# Patient Record
Sex: FEMALE | Race: WHITE | NOT HISPANIC OR LATINO | Employment: OTHER | URBAN - METROPOLITAN AREA
[De-identification: names, ages, dates, MRNs, and addresses within clinical notes are randomized per-mention and may not be internally consistent; named-entity substitution may affect disease eponyms.]

---

## 2017-02-01 RX ORDER — RAMIPRIL 2.5 MG/1
2.5 CAPSULE ORAL DAILY
Qty: 90 CAPSULE | Refills: 1 | OUTPATIENT
Start: 2017-02-01

## 2017-02-01 NOTE — TELEPHONE ENCOUNTER
----- Message from [facility] sent at 2/1/2017  8:25 AM CST -----  Contact: [facility]  Refill Request    ramipril (ALTACE) 2.5 MG capsule    Thanks!

## 2017-03-29 ENCOUNTER — HOSPITAL ENCOUNTER (OUTPATIENT)
Dept: RADIOLOGY | Facility: HOSPITAL | Age: 77
Discharge: HOME | End: 2017-03-29
Attending: SPECIALIST
Payer: MEDICARE

## 2017-03-29 DIAGNOSIS — M51.87 OTHER INTERVERTEBRAL DISC DISORDERS, LUMBOSACRAL REGION: ICD-10-CM

## 2017-03-29 DIAGNOSIS — M51.87 OTHER INTERVERTEBRAL DISC DISORDERS, LUMBOSACRAL REGION: Primary | ICD-10-CM

## 2017-03-29 PROCEDURE — 72100 X-RAY EXAM L-S SPINE 2/3 VWS: CPT | Mod: 26,,, | Performed by: RADIOLOGY

## 2017-03-29 PROCEDURE — 72100 X-RAY EXAM L-S SPINE 2/3 VWS: CPT | Mod: TC

## 2017-05-09 RX ORDER — CLOPIDOGREL BISULFATE 75 MG/1
75 TABLET ORAL DAILY
Qty: 90 TABLET | Refills: 3 | Status: SHIPPED | OUTPATIENT
Start: 2017-05-09

## 2017-05-09 NOTE — TELEPHONE ENCOUNTER
----- Message from Carl Beard sent at 5/9/2017  9:12 AM CDT -----  Contact: Dorinda/[facility]/[phone]  Refill:  clopidogrel (PLAVIX) 75 mg tablet    Thank you.

## 2018-01-15 ENCOUNTER — HOSPITAL ENCOUNTER (EMERGENCY)
Facility: HOSPITAL | Age: 78
Discharge: HOME | End: 2018-01-15
Attending: EMERGENCY MEDICINE
Payer: MEDICARE

## 2018-01-15 VITALS
OXYGEN SATURATION: 100 % | HEART RATE: 84 BPM | SYSTOLIC BLOOD PRESSURE: 138 MMHG | TEMPERATURE: 98 F | WEIGHT: 126 LBS | HEIGHT: 66 IN | BODY MASS INDEX: 20.25 KG/M2 | DIASTOLIC BLOOD PRESSURE: 65 MMHG | RESPIRATION RATE: 17 BRPM

## 2018-01-15 DIAGNOSIS — B34.9 VIRAL SYNDROME: Primary | ICD-10-CM

## 2018-01-15 DIAGNOSIS — R07.9 CHEST PAIN: ICD-10-CM

## 2018-01-15 LAB
DEPRECATED S PYO AG THROAT QL EIA: NEGATIVE
FLUAV AG SPEC QL IA: NEGATIVE
FLUBV AG SPEC QL IA: NEGATIVE
SPECIMEN SOURCE: NORMAL

## 2018-01-15 PROCEDURE — 87081 CULTURE SCREEN ONLY: CPT

## 2018-01-15 PROCEDURE — 87400 INFLUENZA A/B EACH AG IA: CPT

## 2018-01-15 PROCEDURE — 87880 STREP A ASSAY W/OPTIC: CPT

## 2018-01-15 PROCEDURE — 99284 EMERGENCY DEPT VISIT MOD MDM: CPT

## 2018-01-15 NOTE — ED PROVIDER NOTES
Encounter Date: 1/15/2018       History     Chief Complaint   Patient presents with    Nasal Congestion     and drainage , body aches, shaky and lightheaded, since yesterday morning     HPI  Review of patient's allergies indicates:   Allergen Reactions    Sulfa (sulfonamide antibiotics) Hives    Percocet [oxycodone-acetaminophen] Nausea And Vomiting     Past Medical History:   Diagnosis Date    Arthritis     Rheumatoid  arthritis    Cardiac arrhythmia     Coronary artery disease     Diabetes mellitus type II     Pneumonia     as a kid, with empyema    Renal insufficiency     Rheumatoid arthritis(714.0)      Past Surgical History:   Procedure Laterality Date    CARDIAC DEFIBRILLATOR PLACEMENT      CORONARY ARTERY BYPASS GRAFT       4 para 3       HYSTERECTOMY      osteomyelitis      TONSILLECTOMY      TOTAL KNEE ARTHROPLASTY      TOTAL SHOULDER ARTHROPLASTY       Family History   Problem Relation Age of Onset    Heart disease Mother     Diabetes Mother     Cancer Maternal Uncle      Social History   Substance Use Topics    Smoking status: Never Smoker    Smokeless tobacco: Never Used      Comment: Lives with son, extended family.  Three children.    Alcohol use Yes      Comment: only during holidays     Review of Systems    Physical Exam     Initial Vitals [01/15/18 0920]   BP Pulse Resp Temp SpO2   (!) 94/55 88 16 96.6 °F (35.9 °C) 98 %      MAP       68         Physical Exam    ED Course   Procedures  Labs Reviewed   THROAT SCREEN, RAPID   CULTURE, STREP A,  THROAT   INFLUENZA A AND B ANTIGEN                               ED Course      Clinical Impression:   {Add your Clinical Impression here. If you haven't documented one yet, please pend the note, finalize a Clinical Impression, and refresh your note before signing.:[id number]}

## 2018-01-15 NOTE — ED TRIAGE NOTES
Pt arrived to ED via personal transportation for c/o Nasal Congestion (and drainage , body aches, shaky, since this morning). Denies chest pain and SOB. Denies N/V/D/F. REU. AAO x 4. Will continue to monitor.

## 2018-01-15 NOTE — ED PROVIDER NOTES
Encounter Date: 1/15/2018    SCRIBE #1 NOTE: I, Lenny Lemus II, am scribing for, and in the presence of,  Ralph Hoang MD. I have scribed the following portions of the note - Other sections scribed: HPI, ROS, PE.       History     Chief Complaint   Patient presents with    Nasal Congestion     and drainage , body aches, shaky and lightheaded, since yesterday morning     CC: Dry Lips     HPI: This 77 y.o. female with NIDDM, CAD, cardiac arrhythmia, arthritis, pneumonia, rheumatoid arthritis, renal insufficiency,  presents to the ED c/o acute onset, dry lips with sore throat, myalgia, and mouth pain. Pt reports she woke up this morning with dry lips. She reports she called her PCP and was told she would be unable to schedule an appointment until . She reports she was concerned so she visited the ED. Pt reports applying Vaseline to her lips with minimal alleviation. Pt denies headache, rhinorrhea, and diarrhea.         The history is provided by the patient. No  was used.     Review of patient's allergies indicates:   Allergen Reactions    Sulfa (sulfonamide antibiotics) Hives    Percocet [oxycodone-acetaminophen] Nausea And Vomiting     Past Medical History:   Diagnosis Date    Arthritis     Rheumatoid  arthritis    Cardiac arrhythmia     Coronary artery disease     Diabetes mellitus type II     Pneumonia     as a kid, with empyema    Renal insufficiency     Rheumatoid arthritis(714.0)      Past Surgical History:   Procedure Laterality Date    CARDIAC DEFIBRILLATOR PLACEMENT      CORONARY ARTERY BYPASS GRAFT       4 para 3       HYSTERECTOMY      osteomyelitis      TONSILLECTOMY      TOTAL KNEE ARTHROPLASTY      TOTAL SHOULDER ARTHROPLASTY       Family History   Problem Relation Age of Onset    Heart disease Mother     Diabetes Mother     Cancer Maternal Uncle      Social History   Substance Use Topics    Smoking status: Never Smoker    Smokeless tobacco:  Never Used      Comment: Lives with son, extended family.  Three children.    Alcohol use Yes      Comment: only during holidays     Review of Systems   Constitutional: Negative for fever.   HENT: Positive for sore throat.         (+) dry lips   Respiratory: Negative for shortness of breath.    Cardiovascular: Negative for chest pain.   Gastrointestinal: Negative for nausea.   Genitourinary: Negative for dysuria.   Musculoskeletal: Positive for myalgias. Negative for back pain.   Skin: Negative for rash.   Neurological: Negative for weakness.   Hematological: Does not bruise/bleed easily.       Physical Exam     Initial Vitals [01/15/18 0920]   BP Pulse Resp Temp SpO2   (!) 94/55 88 16 96.6 °F (35.9 °C) 98 %      MAP       68         Physical Exam    Nursing note and vitals reviewed.  Constitutional: She appears well-developed and well-nourished. She is cooperative.  Non-toxic appearance. No distress.   HENT:   Head: Normocephalic and atraumatic.   Mouth/Throat: Oropharynx is clear and moist.   Dry Lips   Eyes: Conjunctivae and EOM are normal. Pupils are equal, round, and reactive to light.   Neck: Normal range of motion and full passive range of motion without pain. Neck supple. No thyromegaly present. No JVD present.   Cardiovascular: Normal rate, regular rhythm, normal heart sounds and normal pulses.   Pulmonary/Chest: Effort normal and breath sounds normal. No respiratory distress.   Abdominal: Soft. Normal appearance and bowel sounds are normal. She exhibits no distension and no mass. There is no tenderness.   Musculoskeletal: Normal range of motion.   Neurological: She is alert and oriented to person, place, and time. She has normal strength. No cranial nerve deficit or sensory deficit.   Skin: Skin is warm, dry and intact. No rash noted.   Psychiatric: She has a normal mood and affect. Her speech is normal and behavior is normal. Judgment and thought content normal.         ED Course   Procedures  Labs  Reviewed   THROAT SCREEN, RAPID   INFLUENZA A AND B ANTIGEN        Imaging Results    None                       Scribe Attestation:   Scribe #1: I performed the above scribed service and the documentation accurately describes the services I performed. I attest to the accuracy of the note.    Attending Attestation:           Physician Attestation for Scribe:  Physician Attestation Statement for Scribe #1: I, Ralph Hoang MD, reviewed documentation, as scribed by Lenny Lemus II in my presence, and it is both accurate and complete.                 ED Course      Clinical Impression:   Diagnoses of Chest pain and Chest pain were pertinent to this visit.    Disposition:   Disposition: Discharged  77-year-old white female with a very strange affect presents to the ER complaining that her lips feel sore today and dry she has not put anything on them she complained to the nurses that she had aches and pains all over her body including her chest although she does not mention this to me at all her physical exam is completely benign her lips do look a little bit dry but there is no lesions on them.  Her flu screen is negative and her strep screen is negative her vital signs are normal no send her home after follow-up with her primary care doctor I also asked her to pass by the pharmacy and buy something to more stress her lips                        Ralph Hoang MD  01/15/18 0546

## 2018-01-17 LAB — BACTERIA THROAT CULT: NORMAL

## 2018-01-20 ENCOUNTER — HOSPITAL ENCOUNTER (INPATIENT)
Facility: HOSPITAL | Age: 78
LOS: 9 days | Discharge: HOME HEALTH | DRG: 637 | End: 2018-01-29
Attending: EMERGENCY MEDICINE | Admitting: INTERNAL MEDICINE
Payer: MEDICARE

## 2018-01-20 DIAGNOSIS — B37.31 VAGINAL YEAST INFECTION: ICD-10-CM

## 2018-01-20 DIAGNOSIS — D64.9 ANEMIA, UNSPECIFIED TYPE: ICD-10-CM

## 2018-01-20 DIAGNOSIS — R07.9 CHEST PAIN: ICD-10-CM

## 2018-01-20 DIAGNOSIS — E11.10 DIABETIC KETOACIDOSIS WITHOUT COMA ASSOCIATED WITH TYPE 2 DIABETES MELLITUS: ICD-10-CM

## 2018-01-20 DIAGNOSIS — D61.818 PANCYTOPENIA: Primary | ICD-10-CM

## 2018-01-20 DIAGNOSIS — E87.1 HYPONATREMIA: ICD-10-CM

## 2018-01-20 DIAGNOSIS — D70.9 NEUTROPENIA, UNSPECIFIED TYPE: ICD-10-CM

## 2018-01-20 DIAGNOSIS — B37.0 THRUSH: ICD-10-CM

## 2018-01-20 LAB
ALBUMIN SERPL BCP-MCNC: 2.9 G/DL
ALLENS TEST: ABNORMAL
ALP SERPL-CCNC: 55 U/L
ALT SERPL W/O P-5'-P-CCNC: 23 U/L
ANION GAP SERPL CALC-SCNC: 23 MMOL/L
AST SERPL-CCNC: 13 U/L
B-OH-BUTYR BLD STRIP-SCNC: 5.5 MMOL/L
BACTERIA #/AREA URNS HPF: ABNORMAL /HPF
BACTERIA GENITAL QL WET PREP: ABNORMAL
BASOPHILS # BLD AUTO: ABNORMAL K/UL
BASOPHILS NFR BLD: 0 %
BILIRUB SERPL-MCNC: 0.7 MG/DL
BILIRUB UR QL STRIP: NEGATIVE
BUN SERPL-MCNC: 28 MG/DL
CALCIUM SERPL-MCNC: 9.3 MG/DL
CHLORIDE SERPL-SCNC: 96 MMOL/L
CLARITY UR: ABNORMAL
CLUE CELLS VAG QL WET PREP: ABNORMAL
CO2 SERPL-SCNC: 8 MMOL/L
COLOR UR: YELLOW
CREAT SERPL-MCNC: 1.4 MG/DL
DELSYS: ABNORMAL
DIFFERENTIAL METHOD: ABNORMAL
EOSINOPHIL # BLD AUTO: ABNORMAL K/UL
EOSINOPHIL NFR BLD: 4 %
ERYTHROCYTE [DISTWIDTH] IN BLOOD BY AUTOMATED COUNT: 14.3 %
EST. GFR  (AFRICAN AMERICAN): 42 ML/MIN/1.73 M^2
EST. GFR  (NON AFRICAN AMERICAN): 37 ML/MIN/1.73 M^2
FILAMENT FUNGI VAG WET PREP-#/AREA: ABNORMAL
GLUCOSE SERPL-MCNC: 451 MG/DL
GLUCOSE UR QL STRIP: ABNORMAL
HCO3 UR-SCNC: 10.3 MMOL/L (ref 24–28)
HCT VFR BLD AUTO: 30.9 %
HGB BLD-MCNC: 10.7 G/DL
HGB UR QL STRIP: ABNORMAL
HYALINE CASTS #/AREA URNS LPF: 0 /LPF
KETONES UR QL STRIP: ABNORMAL
LEUKOCYTE ESTERASE UR QL STRIP: ABNORMAL
LYMPHOCYTES # BLD AUTO: ABNORMAL K/UL
LYMPHOCYTES NFR BLD: 58 %
MCH RBC QN AUTO: 31.2 PG
MCHC RBC AUTO-ENTMCNC: 34.6 G/DL
MCV RBC AUTO: 90 FL
MICROSCOPIC COMMENT: ABNORMAL
MONOCYTES # BLD AUTO: ABNORMAL K/UL
MONOCYTES NFR BLD: 10 %
NEUTROPHILS NFR BLD: 8 %
NEUTS BAND NFR BLD MANUAL: 20 %
NITRITE UR QL STRIP: NEGATIVE
OSMOLALITY SERPL: 296 MOSM/KG
PCO2 BLDA: 21 MMHG (ref 35–45)
PH SMN: 7.3 [PH] (ref 7.35–7.45)
PH UR STRIP: 5 [PH] (ref 5–8)
PLATELET # BLD AUTO: 41 K/UL
PMV BLD AUTO: 12.4 FL
PO2 BLDA: 21 MMHG (ref 40–60)
POC BE: -14 MMOL/L
POC SATURATED O2: 30 % (ref 95–100)
POC TCO2: 11 MMOL/L (ref 24–29)
POCT GLUCOSE: 349 MG/DL (ref 70–110)
POCT GLUCOSE: 375 MG/DL (ref 70–110)
POCT GLUCOSE: 420 MG/DL (ref 70–110)
POTASSIUM SERPL-SCNC: 4.7 MMOL/L
PROT SERPL-MCNC: 6.4 G/DL
PROT UR QL STRIP: ABNORMAL
RBC # BLD AUTO: 3.43 M/UL
RBC #/AREA URNS HPF: >100 /HPF (ref 0–4)
SAMPLE: ABNORMAL
SITE: ABNORMAL
SODIUM SERPL-SCNC: 127 MMOL/L
SP GR UR STRIP: 1.02 (ref 1–1.03)
SPECIMEN SOURCE: ABNORMAL
SQUAMOUS #/AREA URNS HPF: 5 /HPF
T VAGINALIS GENITAL QL WET PREP: ABNORMAL
URN SPEC COLLECT METH UR: ABNORMAL
UROBILINOGEN UR STRIP-ACNC: NEGATIVE EU/DL
WBC # BLD AUTO: 0.42 K/UL
WBC #/AREA URNS HPF: 2 /HPF (ref 0–5)
WBC #/AREA VAG WET PREP: ABNORMAL
YEAST GENITAL QL WET PREP: ABNORMAL
YEAST URNS QL MICRO: ABNORMAL

## 2018-01-20 PROCEDURE — 99285 EMERGENCY DEPT VISIT HI MDM: CPT | Mod: 25

## 2018-01-20 PROCEDURE — 87210 SMEAR WET MOUNT SALINE/INK: CPT

## 2018-01-20 PROCEDURE — 83930 ASSAY OF BLOOD OSMOLALITY: CPT

## 2018-01-20 PROCEDURE — 99291 CRITICAL CARE FIRST HOUR: CPT | Mod: 25

## 2018-01-20 PROCEDURE — 82803 BLOOD GASES ANY COMBINATION: CPT

## 2018-01-20 PROCEDURE — 93010 ELECTROCARDIOGRAM REPORT: CPT | Mod: ,,, | Performed by: INTERNAL MEDICINE

## 2018-01-20 PROCEDURE — 96361 HYDRATE IV INFUSION ADD-ON: CPT | Mod: 59

## 2018-01-20 PROCEDURE — 87088 URINE BACTERIA CULTURE: CPT

## 2018-01-20 PROCEDURE — 85027 COMPLETE CBC AUTOMATED: CPT

## 2018-01-20 PROCEDURE — 20000000 HC ICU ROOM

## 2018-01-20 PROCEDURE — 12000002 HC ACUTE/MED SURGE SEMI-PRIVATE ROOM

## 2018-01-20 PROCEDURE — 80053 COMPREHEN METABOLIC PANEL: CPT

## 2018-01-20 PROCEDURE — 63600175 PHARM REV CODE 636 W HCPCS: Performed by: NURSE PRACTITIONER

## 2018-01-20 PROCEDURE — 87077 CULTURE AEROBIC IDENTIFY: CPT

## 2018-01-20 PROCEDURE — 36410 VNPNXR 3YR/> PHY/QHP DX/THER: CPT

## 2018-01-20 PROCEDURE — 25000003 PHARM REV CODE 250: Performed by: NURSE PRACTITIONER

## 2018-01-20 PROCEDURE — 81000 URINALYSIS NONAUTO W/SCOPE: CPT

## 2018-01-20 PROCEDURE — 82962 GLUCOSE BLOOD TEST: CPT

## 2018-01-20 PROCEDURE — 82010 KETONE BODYS QUAN: CPT

## 2018-01-20 PROCEDURE — 87186 SC STD MICRODIL/AGAR DIL: CPT

## 2018-01-20 PROCEDURE — 85007 BL SMEAR W/DIFF WBC COUNT: CPT

## 2018-01-20 PROCEDURE — 87086 URINE CULTURE/COLONY COUNT: CPT

## 2018-01-20 PROCEDURE — 96374 THER/PROPH/DIAG INJ IV PUSH: CPT | Mod: 59

## 2018-01-20 RX ORDER — FAMOTIDINE 10 MG/ML
20 INJECTION INTRAVENOUS EVERY 12 HOURS
Status: DISCONTINUED | OUTPATIENT
Start: 2018-01-21 | End: 2018-01-23 | Stop reason: ALTCHOICE

## 2018-01-20 RX ORDER — FLUCONAZOLE 150 MG/1
600 TABLET ORAL
Status: COMPLETED | OUTPATIENT
Start: 2018-01-20 | End: 2018-01-20

## 2018-01-20 RX ORDER — RAMIPRIL 2.5 MG/1
2.5 CAPSULE ORAL DAILY
Status: DISCONTINUED | OUTPATIENT
Start: 2018-01-21 | End: 2018-01-21

## 2018-01-20 RX ORDER — SODIUM CHLORIDE 0.9 % (FLUSH) 0.9 %
5 SYRINGE (ML) INJECTION
Status: DISCONTINUED | OUTPATIENT
Start: 2018-01-20 | End: 2018-01-29 | Stop reason: HOSPADM

## 2018-01-20 RX ORDER — DEXTROSE MONOHYDRATE 100 MG/ML
1000 INJECTION, SOLUTION INTRAVENOUS
Status: DISCONTINUED | OUTPATIENT
Start: 2018-01-20 | End: 2018-01-21

## 2018-01-20 RX ORDER — LIDOCAINE HYDROCHLORIDE 20 MG/ML
SOLUTION OROPHARYNGEAL
Qty: 100 ML | Refills: 0 | Status: ON HOLD | OUTPATIENT
Start: 2018-01-20 | End: 2020-01-20 | Stop reason: HOSPADM

## 2018-01-20 RX ORDER — METOPROLOL SUCCINATE 25 MG/1
25 TABLET, EXTENDED RELEASE ORAL 2 TIMES DAILY
Status: DISCONTINUED | OUTPATIENT
Start: 2018-01-21 | End: 2018-01-21

## 2018-01-20 RX ORDER — NYSTATIN 100000 [USP'U]/ML
500000 SUSPENSION ORAL 4 TIMES DAILY
Qty: 200 ML | Refills: 0 | Status: SHIPPED | OUTPATIENT
Start: 2018-01-20 | End: 2018-01-30

## 2018-01-20 RX ORDER — LIDOCAINE HYDROCHLORIDE 20 MG/ML
15 SOLUTION OROPHARYNGEAL
Status: COMPLETED | OUTPATIENT
Start: 2018-01-20 | End: 2018-01-20

## 2018-01-20 RX ORDER — SODIUM CHLORIDE 9 MG/ML
INJECTION, SOLUTION INTRAVENOUS CONTINUOUS
Status: DISCONTINUED | OUTPATIENT
Start: 2018-01-21 | End: 2018-01-21

## 2018-01-20 RX ORDER — PRAVASTATIN SODIUM 40 MG/1
40 TABLET ORAL DAILY
Status: DISCONTINUED | OUTPATIENT
Start: 2018-01-21 | End: 2018-01-29 | Stop reason: HOSPADM

## 2018-01-20 RX ORDER — FLUCONAZOLE 150 MG/1
150 TABLET ORAL
Qty: 3 TABLET | Refills: 0 | Status: SHIPPED | OUTPATIENT
Start: 2018-01-20 | End: 2018-01-27

## 2018-01-20 RX ORDER — FLUCONAZOLE 100 MG/1
400 TABLET ORAL DAILY
Status: DISCONTINUED | OUTPATIENT
Start: 2018-01-21 | End: 2018-01-21

## 2018-01-20 RX ORDER — ASPIRIN 325 MG
325 TABLET, DELAYED RELEASE (ENTERIC COATED) ORAL DAILY
Status: DISCONTINUED | OUTPATIENT
Start: 2018-01-21 | End: 2018-01-29 | Stop reason: HOSPADM

## 2018-01-20 RX ORDER — ONDANSETRON 2 MG/ML
4 INJECTION INTRAMUSCULAR; INTRAVENOUS EVERY 8 HOURS PRN
Status: DISCONTINUED | OUTPATIENT
Start: 2018-01-20 | End: 2018-01-21

## 2018-01-20 RX ORDER — CLOPIDOGREL BISULFATE 75 MG/1
75 TABLET ORAL DAILY
Status: DISCONTINUED | OUTPATIENT
Start: 2018-01-21 | End: 2018-01-29 | Stop reason: HOSPADM

## 2018-01-20 RX ORDER — DEXTROSE MONOHYDRATE 100 MG/ML
1000 INJECTION, SOLUTION INTRAVENOUS
Status: DISCONTINUED | OUTPATIENT
Start: 2018-01-20 | End: 2018-01-29 | Stop reason: HOSPADM

## 2018-01-20 RX ADMIN — LIDOCAINE HYDROCHLORIDE 15 ML: 20 SOLUTION ORAL; TOPICAL at 05:01

## 2018-01-20 RX ADMIN — SODIUM CHLORIDE 1000 ML: 0.9 INJECTION, SOLUTION INTRAVENOUS at 09:01

## 2018-01-20 RX ADMIN — SODIUM CHLORIDE 1000 ML: 0.9 INJECTION, SOLUTION INTRAVENOUS at 11:01

## 2018-01-20 RX ADMIN — FLUCONAZOLE 600 MG: 150 TABLET ORAL at 11:01

## 2018-01-20 RX ADMIN — INSULIN HUMAN 3 UNITS: 100 INJECTION, SOLUTION PARENTERAL at 09:01

## 2018-01-20 NOTE — ED TRIAGE NOTES
Here with family friend. Unclear as to prescribed medications.  C/o lesions and pain to mouth. C/o back pain and painful urination. Reports burning sensation to vagina.

## 2018-01-21 PROBLEM — D84.9 ACQUIRED IMMUNOCOMPROMISED STATE: Status: ACTIVE | Noted: 2018-01-21

## 2018-01-21 PROBLEM — Z91.148 NONCOMPLIANCE WITH MEDICATION REGIMEN: Status: ACTIVE | Noted: 2018-01-21

## 2018-01-21 PROBLEM — B37.0 ORAL THRUSH: Status: ACTIVE | Noted: 2018-01-21

## 2018-01-21 PROBLEM — B37.31 VAGINAL CANDIDIASIS: Status: ACTIVE | Noted: 2018-01-21

## 2018-01-21 PROBLEM — D61.818 PANCYTOPENIA: Status: ACTIVE | Noted: 2018-01-21

## 2018-01-21 PROBLEM — D70.9 NEUTROPENIA: Status: ACTIVE | Noted: 2018-01-21

## 2018-01-21 LAB
ALBUMIN SERPL BCP-MCNC: 2.5 G/DL
ALLENS TEST: ABNORMAL
ALP SERPL-CCNC: 50 U/L
ALT SERPL W/O P-5'-P-CCNC: 22 U/L
ANION GAP SERPL CALC-SCNC: 10 MMOL/L
ANION GAP SERPL CALC-SCNC: 13 MMOL/L
ANION GAP SERPL CALC-SCNC: 21 MMOL/L
ANION GAP SERPL CALC-SCNC: 8 MMOL/L
ANION GAP SERPL CALC-SCNC: 9 MMOL/L
ANISOCYTOSIS BLD QL SMEAR: SLIGHT
AST SERPL-CCNC: 19 U/L
BACTERIA #/AREA URNS HPF: NORMAL /HPF
BASOPHILS # BLD AUTO: 0 K/UL
BASOPHILS NFR BLD: 0 %
BILIRUB SERPL-MCNC: 0.6 MG/DL
BILIRUB UR QL STRIP: NEGATIVE
BUN SERPL-MCNC: 17 MG/DL
BUN SERPL-MCNC: 19 MG/DL
BUN SERPL-MCNC: 20 MG/DL
BUN SERPL-MCNC: 24 MG/DL
BUN SERPL-MCNC: 25 MG/DL
BURR CELLS BLD QL SMEAR: ABNORMAL
CALCIUM SERPL-MCNC: 8.1 MG/DL
CALCIUM SERPL-MCNC: 8.3 MG/DL
CALCIUM SERPL-MCNC: 8.4 MG/DL
CALCIUM SERPL-MCNC: 8.5 MG/DL
CALCIUM SERPL-MCNC: 8.6 MG/DL
CHLORIDE SERPL-SCNC: 102 MMOL/L
CHLORIDE SERPL-SCNC: 103 MMOL/L
CHLORIDE SERPL-SCNC: 104 MMOL/L
CHLORIDE SERPL-SCNC: 106 MMOL/L
CHLORIDE SERPL-SCNC: 106 MMOL/L
CLARITY UR: CLEAR
CO2 SERPL-SCNC: 14 MMOL/L
CO2 SERPL-SCNC: 14 MMOL/L
CO2 SERPL-SCNC: 15 MMOL/L
CO2 SERPL-SCNC: 15 MMOL/L
CO2 SERPL-SCNC: 5 MMOL/L
COLOR UR: YELLOW
CREAT SERPL-MCNC: 0.8 MG/DL
CREAT SERPL-MCNC: 0.8 MG/DL
CREAT SERPL-MCNC: 0.9 MG/DL
CREAT SERPL-MCNC: 1 MG/DL
CREAT SERPL-MCNC: 1.2 MG/DL
DELSYS: ABNORMAL
DIFFERENTIAL METHOD: ABNORMAL
EOSINOPHIL # BLD AUTO: 0.1 K/UL
EOSINOPHIL NFR BLD: 12.7 %
ERYTHROCYTE [DISTWIDTH] IN BLOOD BY AUTOMATED COUNT: 14.1 %
EST. GFR  (AFRICAN AMERICAN): 51 ML/MIN/1.73 M^2
EST. GFR  (AFRICAN AMERICAN): >60 ML/MIN/1.73 M^2
EST. GFR  (NON AFRICAN AMERICAN): 44 ML/MIN/1.73 M^2
EST. GFR  (NON AFRICAN AMERICAN): 55 ML/MIN/1.73 M^2
EST. GFR  (NON AFRICAN AMERICAN): >60 ML/MIN/1.73 M^2
ESTIMATED AVG GLUCOSE: 246 MG/DL
GLUCOSE SERPL-MCNC: 124 MG/DL
GLUCOSE SERPL-MCNC: 128 MG/DL
GLUCOSE SERPL-MCNC: 165 MG/DL
GLUCOSE SERPL-MCNC: 265 MG/DL
GLUCOSE SERPL-MCNC: 297 MG/DL
GLUCOSE UR QL STRIP: ABNORMAL
HBA1C MFR BLD HPLC: 10.2 %
HCO3 UR-SCNC: 12.3 MMOL/L (ref 24–28)
HCT VFR BLD AUTO: 28.7 %
HGB BLD-MCNC: 10.2 G/DL
HGB UR QL STRIP: ABNORMAL
HIV1+2 IGG SERPL QL IA.RAPID: NEGATIVE
HYALINE CASTS #/AREA URNS LPF: 0 /LPF
HYPOCHROMIA BLD QL SMEAR: ABNORMAL
KETONES UR QL STRIP: ABNORMAL
LDH SERPL L TO P-CCNC: 160 U/L
LEUKOCYTE ESTERASE UR QL STRIP: NEGATIVE
LYMPHOCYTES # BLD AUTO: 0.3 K/UL
LYMPHOCYTES NFR BLD: 60 %
MCH RBC QN AUTO: 31.6 PG
MCHC RBC AUTO-ENTMCNC: 35.5 G/DL
MCV RBC AUTO: 89 FL
MICROSCOPIC COMMENT: NORMAL
MONOCYTES # BLD AUTO: 0 K/UL
MONOCYTES NFR BLD: 5.5 %
NEUTROPHILS # BLD AUTO: 0.1 K/UL
NEUTROPHILS NFR BLD: 21.8 %
NITRITE UR QL STRIP: NEGATIVE
PATH REV BLD -IMP: NORMAL
PCO2 BLDA: 21.5 MMHG (ref 35–45)
PH SMN: 7.36 [PH] (ref 7.35–7.45)
PH UR STRIP: 5 [PH] (ref 5–8)
PLATELET # BLD AUTO: 32 K/UL
PLATELET BLD QL SMEAR: ABNORMAL
PMV BLD AUTO: 11.7 FL
PO2 BLDA: 111 MMHG (ref 80–100)
POC BE: -12 MMOL/L
POC SATURATED O2: 98 % (ref 95–100)
POC TCO2: 13 MMOL/L (ref 23–27)
POCT GLUCOSE: 132 MG/DL (ref 70–110)
POCT GLUCOSE: 135 MG/DL (ref 70–110)
POCT GLUCOSE: 136 MG/DL (ref 70–110)
POCT GLUCOSE: 140 MG/DL (ref 70–110)
POCT GLUCOSE: 143 MG/DL (ref 70–110)
POCT GLUCOSE: 152 MG/DL (ref 70–110)
POCT GLUCOSE: 154 MG/DL (ref 70–110)
POCT GLUCOSE: 175 MG/DL (ref 70–110)
POCT GLUCOSE: 176 MG/DL (ref 70–110)
POCT GLUCOSE: 178 MG/DL (ref 70–110)
POCT GLUCOSE: 202 MG/DL (ref 70–110)
POCT GLUCOSE: 281 MG/DL (ref 70–110)
POCT GLUCOSE: 343 MG/DL (ref 70–110)
POCT GLUCOSE: 375 MG/DL (ref 70–110)
POIKILOCYTOSIS BLD QL SMEAR: SLIGHT
POTASSIUM SERPL-SCNC: 3 MMOL/L
POTASSIUM SERPL-SCNC: 3.2 MMOL/L
POTASSIUM SERPL-SCNC: 3.4 MMOL/L
POTASSIUM SERPL-SCNC: 3.8 MMOL/L
POTASSIUM SERPL-SCNC: 4.3 MMOL/L
PROT SERPL-MCNC: 5.4 G/DL
PROT UR QL STRIP: ABNORMAL
RBC # BLD AUTO: 3.23 M/UL
RBC #/AREA URNS HPF: 1 /HPF (ref 0–4)
SAMPLE: ABNORMAL
SITE: ABNORMAL
SODIUM SERPL-SCNC: 127 MMOL/L
SODIUM SERPL-SCNC: 128 MMOL/L
SODIUM SERPL-SCNC: 129 MMOL/L
SODIUM SERPL-SCNC: 130 MMOL/L
SODIUM SERPL-SCNC: 131 MMOL/L
SP GR UR STRIP: 1.02 (ref 1–1.03)
SQUAMOUS #/AREA URNS HPF: 1 /HPF
URN SPEC COLLECT METH UR: ABNORMAL
UROBILINOGEN UR STRIP-ACNC: NEGATIVE EU/DL
WBC # BLD AUTO: 0.55 K/UL
WBC #/AREA URNS HPF: 1 /HPF (ref 0–5)
YEAST URNS QL MICRO: NORMAL

## 2018-01-21 PROCEDURE — 25000003 PHARM REV CODE 250: Performed by: HOSPITALIST

## 2018-01-21 PROCEDURE — 87086 URINE CULTURE/COLONY COUNT: CPT

## 2018-01-21 PROCEDURE — 80053 COMPREHEN METABOLIC PANEL: CPT

## 2018-01-21 PROCEDURE — 36415 COLL VENOUS BLD VENIPUNCTURE: CPT

## 2018-01-21 PROCEDURE — 80048 BASIC METABOLIC PNL TOTAL CA: CPT | Mod: 91

## 2018-01-21 PROCEDURE — 86703 HIV-1/HIV-2 1 RESULT ANTBDY: CPT

## 2018-01-21 PROCEDURE — 63600175 PHARM REV CODE 636 W HCPCS: Performed by: INTERNAL MEDICINE

## 2018-01-21 PROCEDURE — 87040 BLOOD CULTURE FOR BACTERIA: CPT

## 2018-01-21 PROCEDURE — 25000003 PHARM REV CODE 250: Performed by: NURSE PRACTITIONER

## 2018-01-21 PROCEDURE — 85060 BLOOD SMEAR INTERPRETATION: CPT | Mod: ,,, | Performed by: PATHOLOGY

## 2018-01-21 PROCEDURE — 81000 URINALYSIS NONAUTO W/SCOPE: CPT

## 2018-01-21 PROCEDURE — 99223 1ST HOSP IP/OBS HIGH 75: CPT | Mod: ,,, | Performed by: INTERNAL MEDICINE

## 2018-01-21 PROCEDURE — 82803 BLOOD GASES ANY COMBINATION: CPT

## 2018-01-21 PROCEDURE — 25000003 PHARM REV CODE 250: Performed by: INTERNAL MEDICINE

## 2018-01-21 PROCEDURE — 85025 COMPLETE CBC W/AUTO DIFF WBC: CPT

## 2018-01-21 PROCEDURE — 80048 BASIC METABOLIC PNL TOTAL CA: CPT

## 2018-01-21 PROCEDURE — S0028 INJECTION, FAMOTIDINE, 20 MG: HCPCS | Performed by: NURSE PRACTITIONER

## 2018-01-21 PROCEDURE — 83036 HEMOGLOBIN GLYCOSYLATED A1C: CPT

## 2018-01-21 PROCEDURE — 20000000 HC ICU ROOM

## 2018-01-21 PROCEDURE — S5010 5% DEXTROSE AND 0.45% SALINE: HCPCS | Performed by: HOSPITALIST

## 2018-01-21 PROCEDURE — 36600 WITHDRAWAL OF ARTERIAL BLOOD: CPT

## 2018-01-21 PROCEDURE — 83615 LACTATE (LD) (LDH) ENZYME: CPT

## 2018-01-21 RX ORDER — CEFTRIAXONE 1 G/1
1 INJECTION, POWDER, FOR SOLUTION INTRAMUSCULAR; INTRAVENOUS
Status: DISCONTINUED | OUTPATIENT
Start: 2018-01-21 | End: 2018-01-22

## 2018-01-21 RX ORDER — POTASSIUM CHLORIDE 7.45 MG/ML
10 INJECTION INTRAVENOUS
Status: COMPLETED | OUTPATIENT
Start: 2018-01-21 | End: 2018-01-21

## 2018-01-21 RX ORDER — METOPROLOL SUCCINATE 25 MG/1
25 TABLET, EXTENDED RELEASE ORAL 2 TIMES DAILY
Status: DISCONTINUED | OUTPATIENT
Start: 2018-01-21 | End: 2018-01-29 | Stop reason: HOSPADM

## 2018-01-21 RX ORDER — IBUPROFEN 200 MG
16 TABLET ORAL
Status: DISCONTINUED | OUTPATIENT
Start: 2018-01-21 | End: 2018-01-29 | Stop reason: HOSPADM

## 2018-01-21 RX ORDER — NYSTATIN 100000 [USP'U]/ML
500000 SUSPENSION ORAL
Status: DISCONTINUED | OUTPATIENT
Start: 2018-01-21 | End: 2018-01-26

## 2018-01-21 RX ORDER — ACETAMINOPHEN 325 MG/1
650 TABLET ORAL EVERY 4 HOURS PRN
Status: DISCONTINUED | OUTPATIENT
Start: 2018-01-21 | End: 2018-01-29 | Stop reason: HOSPADM

## 2018-01-21 RX ORDER — IBUPROFEN 200 MG
24 TABLET ORAL
Status: DISCONTINUED | OUTPATIENT
Start: 2018-01-21 | End: 2018-01-29 | Stop reason: HOSPADM

## 2018-01-21 RX ORDER — GLUCAGON 1 MG
1 KIT INJECTION
Status: DISCONTINUED | OUTPATIENT
Start: 2018-01-21 | End: 2018-01-29 | Stop reason: HOSPADM

## 2018-01-21 RX ORDER — DEXTROSE MONOHYDRATE AND SODIUM CHLORIDE 5; .45 G/100ML; G/100ML
INJECTION, SOLUTION INTRAVENOUS CONTINUOUS
Status: DISCONTINUED | OUTPATIENT
Start: 2018-01-21 | End: 2018-01-21

## 2018-01-21 RX ORDER — FLUCONAZOLE 2 MG/ML
400 INJECTION, SOLUTION INTRAVENOUS
Status: DISCONTINUED | OUTPATIENT
Start: 2018-01-21 | End: 2018-01-21

## 2018-01-21 RX ORDER — MICONAZOLE NITRATE 2 %
POWDER (GRAM) TOPICAL 2 TIMES DAILY
Status: DISCONTINUED | OUTPATIENT
Start: 2018-01-21 | End: 2018-01-23

## 2018-01-21 RX ORDER — INSULIN ASPART 100 [IU]/ML
1-10 INJECTION, SOLUTION INTRAVENOUS; SUBCUTANEOUS
Status: DISCONTINUED | OUTPATIENT
Start: 2018-01-21 | End: 2018-01-29 | Stop reason: HOSPADM

## 2018-01-21 RX ORDER — LEUCOVORIN CALCIUM 5 MG/1
10 TABLET ORAL DAILY
Status: DISCONTINUED | OUTPATIENT
Start: 2018-01-21 | End: 2018-01-21

## 2018-01-21 RX ORDER — ACETAMINOPHEN 325 MG/1
650 TABLET ORAL EVERY 6 HOURS PRN
Status: DISCONTINUED | OUTPATIENT
Start: 2018-01-21 | End: 2018-01-29 | Stop reason: HOSPADM

## 2018-01-21 RX ORDER — ONDANSETRON 2 MG/ML
8 INJECTION INTRAMUSCULAR; INTRAVENOUS EVERY 8 HOURS PRN
Status: DISCONTINUED | OUTPATIENT
Start: 2018-01-21 | End: 2018-01-29 | Stop reason: HOSPADM

## 2018-01-21 RX ORDER — RAMIPRIL 2.5 MG/1
2.5 CAPSULE ORAL DAILY
Status: DISCONTINUED | OUTPATIENT
Start: 2018-01-21 | End: 2018-01-29 | Stop reason: HOSPADM

## 2018-01-21 RX ADMIN — POTASSIUM CHLORIDE 10 MEQ: 10 INJECTION, SOLUTION INTRAVENOUS at 05:01

## 2018-01-21 RX ADMIN — BENZOCAINE: 200 SPRAY DENTAL; ORAL; PERIODONTAL at 10:01

## 2018-01-21 RX ADMIN — SODIUM CHLORIDE 6 UNITS/HR: 9 INJECTION, SOLUTION INTRAVENOUS at 12:01

## 2018-01-21 RX ADMIN — FAMOTIDINE 20 MG: 10 INJECTION, SOLUTION INTRAVENOUS at 08:01

## 2018-01-21 RX ADMIN — LEUCOVORIN CALCIUM 10 MG: 50 INJECTION, POWDER, LYOPHILIZED, FOR SOLUTION INTRAMUSCULAR; INTRAVENOUS at 05:01

## 2018-01-21 RX ADMIN — POTASSIUM CHLORIDE 10 MEQ: 10 INJECTION, SOLUTION INTRAVENOUS at 10:01

## 2018-01-21 RX ADMIN — POTASSIUM CHLORIDE 10 MEQ: 10 INJECTION, SOLUTION INTRAVENOUS at 08:01

## 2018-01-21 RX ADMIN — NYSTATIN 500000 UNITS: 100000 SUSPENSION ORAL at 08:01

## 2018-01-21 RX ADMIN — FAMOTIDINE 20 MG: 10 INJECTION, SOLUTION INTRAVENOUS at 12:01

## 2018-01-21 RX ADMIN — INSULIN DETEMIR 9 UNITS: 100 INJECTION, SOLUTION SUBCUTANEOUS at 08:01

## 2018-01-21 RX ADMIN — MICONAZOLE NITRATE: 20.6 POWDER TOPICAL at 08:01

## 2018-01-21 RX ADMIN — INSULIN DETEMIR 8 UNITS: 100 INJECTION, SOLUTION SUBCUTANEOUS at 11:01

## 2018-01-21 RX ADMIN — METOPROLOL SUCCINATE 25 MG: 25 TABLET, EXTENDED RELEASE ORAL at 12:01

## 2018-01-21 RX ADMIN — DEXTROSE AND SODIUM CHLORIDE: 5; .45 INJECTION, SOLUTION INTRAVENOUS at 12:01

## 2018-01-21 RX ADMIN — NYSTATIN 500000 UNITS: 100000 SUSPENSION ORAL at 04:01

## 2018-01-21 RX ADMIN — PRAVASTATIN SODIUM 40 MG: 40 TABLET ORAL at 09:01

## 2018-01-21 RX ADMIN — FAMOTIDINE 20 MG: 10 INJECTION, SOLUTION INTRAVENOUS at 09:01

## 2018-01-21 RX ADMIN — ACETAMINOPHEN 650 MG: 325 TABLET ORAL at 08:01

## 2018-01-21 RX ADMIN — SODIUM CHLORIDE: 0.9 INJECTION, SOLUTION INTRAVENOUS at 12:01

## 2018-01-21 RX ADMIN — CEFTRIAXONE SODIUM 1 G: 1 INJECTION, POWDER, FOR SOLUTION INTRAMUSCULAR; INTRAVENOUS at 02:01

## 2018-01-21 RX ADMIN — DEXTROSE AND SODIUM CHLORIDE: 5; .45 INJECTION, SOLUTION INTRAVENOUS at 04:01

## 2018-01-21 RX ADMIN — NYSTATIN 500000 UNITS: 100000 SUSPENSION ORAL at 11:01

## 2018-01-21 RX ADMIN — INSULIN ASPART 6 UNITS: 100 INJECTION, SOLUTION INTRAVENOUS; SUBCUTANEOUS at 04:01

## 2018-01-21 RX ADMIN — POTASSIUM CHLORIDE 10 MEQ: 10 INJECTION, SOLUTION INTRAVENOUS at 09:01

## 2018-01-21 RX ADMIN — MICONAZOLE NITRATE: 20.6 POWDER TOPICAL at 10:01

## 2018-01-21 RX ADMIN — POTASSIUM CHLORIDE 10 MEQ: 10 INJECTION, SOLUTION INTRAVENOUS at 07:01

## 2018-01-21 NOTE — ED PROVIDER NOTES
Encounter Date: 2018       History     Chief Complaint   Patient presents with    Mouth Lesions     very painful mouth lesions x several days.  unable to eat.     Chief complaint: Mouth lesions    History of present illness: Patient is a 77-year-old female who presents for lesions in her mouth is been present for several days that are constantly painful and preventing her from eating and drinking.  She reports the pain as a 10 out of 10.  She also reports lower back pain and urinary frequency, urgency, hematuria, dysuria.  She denies abdominal pain.        The history is provided by the patient and a friend. No  was used.     Review of patient's allergies indicates:   Allergen Reactions    Sulfa (sulfonamide antibiotics) Hives    Percocet [oxycodone-acetaminophen] Nausea And Vomiting     Past Medical History:   Diagnosis Date    Arthritis     Rheumatoid  arthritis    Cardiac arrhythmia     Coronary artery disease     Diabetes mellitus type II     Pneumonia     as a kid, with empyema    Renal insufficiency     Rheumatoid arthritis(714.0)      Past Surgical History:   Procedure Laterality Date    CARDIAC DEFIBRILLATOR PLACEMENT      CORONARY ARTERY BYPASS GRAFT       4 para 3       HYSTERECTOMY      osteomyelitis      TONSILLECTOMY      TOTAL KNEE ARTHROPLASTY      TOTAL SHOULDER ARTHROPLASTY       Family History   Problem Relation Age of Onset    Heart disease Mother     Diabetes Mother     Cancer Maternal Uncle      Social History   Substance Use Topics    Smoking status: Never Smoker    Smokeless tobacco: Never Used      Comment: Lives with son, extended family.  Three children.    Alcohol use Yes      Comment: only during holidays     Review of Systems   Constitutional: Negative for chills, fatigue and fever.   HENT: Negative for congestion, ear discharge, ear pain, postnasal drip, rhinorrhea, sinus pressure, sneezing, sore throat and voice change.          Mouth lesions   Eyes: Negative for discharge and itching.   Respiratory: Negative for cough, shortness of breath and wheezing.    Cardiovascular: Negative for chest pain, palpitations and leg swelling.   Gastrointestinal: Negative for abdominal pain, constipation, diarrhea, nausea and vomiting.   Endocrine: Negative for polydipsia, polyphagia and polyuria.   Genitourinary: Positive for dysuria, frequency, hematuria, urgency and vaginal pain. Negative for vaginal bleeding and vaginal discharge.   Musculoskeletal: Negative for arthralgias and myalgias.   Skin: Negative for rash and wound.   Neurological: Negative for dizziness, seizures, syncope, weakness and numbness.   Hematological: Negative for adenopathy. Does not bruise/bleed easily.   Psychiatric/Behavioral: Negative for self-injury and suicidal ideas. The patient is not nervous/anxious.        Physical Exam     Initial Vitals [01/20/18 1613]   BP Pulse Resp Temp SpO2   (!) 126/56 96 16 97.5 °F (36.4 °C) 98 %      MAP       79.33         Physical Exam    Nursing note and vitals reviewed.  Constitutional: She appears well-developed and well-nourished.   HENT:   Head: Normocephalic and atraumatic.   Right Ear: External ear normal.   Left Ear: External ear normal.   Nose: Nose normal.   White crust throughout the oral cavity   Eyes: Conjunctivae and EOM are normal. Pupils are equal, round, and reactive to light. Right eye exhibits no discharge. Left eye exhibits no discharge.   Neck: Normal range of motion.   Cardiovascular: Regular rhythm, S1 normal, S2 normal and normal heart sounds. Exam reveals no gallop.    No murmur heard.  Pulmonary/Chest: Effort normal and breath sounds normal. No respiratory distress. She has no decreased breath sounds. She has no wheezes. She has no rhonchi. She has no rales.   Abdominal: She exhibits no distension.   Genitourinary: There is rash on the right labia. There is rash on the left labia.   Genitourinary Comments: Labia majora  are reddened, swollen, there is white vaginal discharge   Musculoskeletal: Normal range of motion.   Neurological: She is alert and oriented to person, place, and time.   Skin: Skin is dry. Capillary refill takes less than 2 seconds.         ED Course   Critical Care  Date/Time: 1/20/2018 10:30 PM  Performed by: MERLY JOSE  Authorized by: LAZARUS SANDOVAL   Direct patient critical care time: 60 minutes  Additional history critical care time: 10 minutes  Ordering / reviewing critical care time: 25 minutes  Documentation critical care time: 20 minutes  Consulting other physicians critical care time: 5 minutes  Consult with family critical care time: 15 minutes  Other critical care time: 0 minutes  Total critical care time (exclusive of procedural time) : 135 minutes  Critical care was necessary to treat or prevent imminent or life-threatening deterioration of the following conditions: metabolic crisis.  Critical care was time spent personally by me on the following activities: blood draw for specimens, development of treatment plan with patient or surrogate, discussions with consultants, discussions with primary provider, evaluation of patient's response to treatment, examination of patient, obtaining history from patient or surrogate, ordering and performing treatments and interventions, ordering and review of laboratory studies, ordering and review of radiographic studies, re-evaluation of patient's condition, review of old charts and vascular access procedures.  Subsequent provider of critical care: I assumed direction of critical care for this patient from another provider of my specialty.    External Jugular IV  Date/Time: 1/20/2018 10:31 PM  Performed by: MERLY JOSE  Authorized by: LAZARUS SANDOVAL   Consent Done: Not Needed  Location (Ext Jugular): Right.  Area Prepped With: Alcohol.  Catheter Size: 20 ga.  Catheter Type: Jelco.  Number of attempts: 1 attempt by me, 1 attempt by   Carlos.  Patient tolerance: Patient tolerated the procedure well with no immediate complications  Comments: Both attempts unsuccessful    US - ED Performed - Guidance Vascular Access  Date/Time: 1/20/2018 10:32 PM  Performed by: MERLY JOSE  Authorized by: LAZARUS SANDOVAL         Labs Reviewed   VAGINAL SCREEN - Abnormal; Notable for the following:        Result Value    Clue Cells, Wet Prep Few (*)     Budding Yeast Few (*)     Fungal Hyphae Rare (*)     WBC - Vaginal Screen Occasional (*)     Bacteria - Vaginal Screen Moderate (*)     All other components within normal limits   URINALYSIS - Abnormal; Notable for the following:     Appearance, UA Hazy (*)     Protein, UA 2+ (*)     Glucose, UA 3+ (*)     Ketones, UA 2+ (*)     Occult Blood UA 3+ (*)     Leukocytes, UA Trace (*)     All other components within normal limits   URINALYSIS MICROSCOPIC - Abnormal; Notable for the following:     RBC, UA >100 (*)     All other components within normal limits   CBC W/ AUTO DIFFERENTIAL - Abnormal; Notable for the following:     WBC 0.42 (*)     RBC 3.43 (*)     Hemoglobin 10.7 (*)     Hematocrit 30.9 (*)     MCH 31.2 (*)     Platelets 41 (*)     Gran% 8.0 (*)     Lymph% 58.0 (*)     All other components within normal limits    Narrative:     WBC   critical result(s) called and verbal readback obtained from   JACQUELINE REA., 01/20/2018 22:09  Recoll. [id number] by ESVIN at 01/20/2018 20:39, reason: Possible IV   fluid contamination.   COMPREHENSIVE METABOLIC PANEL - Abnormal; Notable for the following:     Sodium 127 (*)     CO2 8 (*)     Glucose 451 (*)     BUN, Bld 28 (*)     Albumin 2.9 (*)     Anion Gap 23 (*)     eGFR if  42 (*)     eGFR if non  37 (*)     All other components within normal limits    Narrative:     CARBON DIOXIDE AND GLUCOSE  critical result(s) called and verbal   readback obtained from JACQUELINE CORDERO. , 01/20/2018 21:43  Recoll. [id number] by [email or web address] at 01/20/2018  20:39, reason: Possible IV   fluid contamination.   BETA - HYDROXYBUTYRATE, SERUM - Abnormal; Notable for the following:     Beta-Hydroxybutyrate 5.5 (*)     All other components within normal limits   POCT GLUCOSE - Abnormal; Notable for the following:     POCT Glucose 420 (*)     All other components within normal limits   POCT GLUCOSE - Abnormal; Notable for the following:     POCT Glucose 375 (*)     All other components within normal limits   POCT GLUCOSE - Abnormal; Notable for the following:     POCT Glucose 349 (*)     All other components within normal limits   CULTURE, URINE   OSMOLALITY   POCT GLUCOSE MONITORING CONTINUOUS             Medical Decision Making:   Initial Assessment:   Patient is a 77-year-old female who presents for emergent consideration for lesions of the mouth and urinary symptoms.  Initial NIC=016.  After questioning, patient reports that she has not been taking her diabetic medications.    Differential Diagnosis:   Differential diagnoses include but are not limited to lactic acidosis, uremia, alcoholic ketoacidosis, starvation ketoacidosis, salicylate poisoning, vaginitis, STD, normal vaginal discharge, bacterial vaginosis, Trichomonas, Candida, vaginitis, genital wart, thrush, hand-foot-and-mouth disease, periodontitis.  ED Management:  Vaginal screen and urinary tract infection indicated a yeast infection with possibility of UTI, urine culture was sent. Following receipt of initial blood sugar of 420, 1 L normal saline was started.  Laboratories indicated extreme neutropenia with a white blood cell count of 0.4, mild anemia, and decreased platelets (pancytopenia).  Due to extreme difficulty with vascular access, patient underwent multiple needle sticks and was given insulin 3 units IV push while awaiting further laboratories.  CMP indicated diabetic ketoacidosis with a CO2 of 8, beta hydroxybutyrate at 5.5, increased creatinine at 1.4 from her baseline of 1.2, and adjusted sodium of  131.  Venous blood gas indicated a pH of 7.296.    Other:   I have discussed this case with another health care provider.       <> Summary of the Discussion: Care of the patient discussed with Dr. Gonzalez who agreed with the assessment and plan.   Patient was started on an insulin drip in the emergency department prior to admission.  Patient was given an initial Diflucan 600 mg to be followed by 40 mg daily by mouth ×4 days.  While in the ICU the patient will be subjective to consult by infectious disease and hematology.  Dr. Kim took sign off report for admission to Dr. Bebo Wild                   ED Course as of Jan 21 1847   Sat Jan 20, 2018 1914 Vaginal screen pos for yeast. Pos for clue cells, this should be repeated following resolution of vaginitis.    UA pos for leukocytes, blood and protein, as well as glucose and protein--will check cbg and culture urine.  Microscopic shows >100rbc/hpf (most likely due to denuded skin of vagina) and only 2 rbc/hpf.    ITO=155wu/dL  Will order NS 1L IV, 3U regular insulin cbc, cmp  [VC]   2139 Notified of 0.4 wbc.  [VC]   2246 VBG indicated acidosis.  [VC]   2247 CBC: leukocyte count was reduced, the H&H was reduced. The platelet count was reduced. This indicates severe neutropenia--risk for infection is high.  Anemia-mild.  Thrombocytopenia--risk for bleeding is high.    [VC]   2248 CMP indicates mild hyponatremia with an adjusted sodium of 131.  Hypercapnia with CO2 of 8.  Acute on chronic renal insufficiency. Hypoalbunemia.  No transaminitis.  [VC]   2249 Beta hydroxybutyrate of 5.5 indicates DKA.  [VC]   2250 Needs recheck, nurse will perform.  EKG ordered.  Pt will be placed on monitor. Pulse: (!) 153 [VC]   2250 BP increased.  Will require recheck. BP: (!) 188/91 [VC]   2317 BP: (!) 160/69 [VC]   2317 Pulse: 95 [VC]      ED Course User Index  [VC] Ben Nguyen DNP     Clinical Impression:   The primary encounter diagnosis was Diabetic ketoacidosis  without coma associated with type 2 diabetes mellitus. Diagnoses of Vaginal yeast infection, Thrush, Neutropenia, unspecified type, Anemia, unspecified type, and Hyponatremia were also pertinent to this visit.    Disposition:   Disposition: Admitted  Condition: Serious                        Ben Nguyen DNP  01/20/18 2251       Ben Nguyen DNP  01/21/18 2985

## 2018-01-21 NOTE — ASSESSMENT & PLAN NOTE
Patient states she has not been taking her insulin regimen as prescribed at home.  This is likely the underlying etiology of her diabetic ketoacidosis.

## 2018-01-21 NOTE — CONSULTS
Consult Note  Infectious Disease    Consult Requested By: Radha Cabral MD    Reason for Consult: pancytopenia and fever    SUBJECTIVE:     History of Present Illness:  Patient is a 77 y.o. female presents with mouth ulcers. She has severe RA and has been on methotrexate and plaquenil for 3 years. She does not recall when her last visit to rheumatology occurred. She is now here with painful mouth ulcers and vaginal ulcers since 1-2 weeks. Her cbc shows a wbc of 0.5, platelets of 32 and normal renal function. She has run a fever of 101.there are cultures of blood. She has been placed on iv diflucan. hiv is negative.    Past Medical History:   Diagnosis Date    Arthritis     Rheumatoid  arthritis    Cardiac arrhythmia     Coronary artery disease     Diabetes mellitus type II     Pneumonia     as a kid, with empyema    Renal insufficiency     Rheumatoid arthritis(714.0)      Past Surgical History:   Procedure Laterality Date    CARDIAC DEFIBRILLATOR PLACEMENT      CORONARY ARTERY BYPASS GRAFT       4 para 3       HYSTERECTOMY      osteomyelitis      TONSILLECTOMY      TOTAL KNEE ARTHROPLASTY      TOTAL SHOULDER ARTHROPLASTY       Family History   Problem Relation Age of Onset    Heart disease Mother     Diabetes Mother     Cancer Maternal Uncle      Social History   Substance Use Topics    Smoking status: Never Smoker    Smokeless tobacco: Never Used      Comment: Lives with son, extended family.  Three children.    Alcohol use Yes      Comment: only during holidays       Review of patient's allergies indicates:   Allergen Reactions    Sulfa (sulfonamide antibiotics) Hives    Percocet [oxycodone-acetaminophen] Nausea And Vomiting        Antibiotics     None          Review of Systems:  Constitutional: no fever or chills  Eyes: no visual changes  ENT: no nasal congestion or sore throat  Respiratory: no cough or shortness of breath  Cardiovascular: no chest pain or  palpitations  Gastrointestinal: no nausea or vomiting, no abdominal pain or change in bowel habits  Genitourinary: no hematuria or dysuria  Integument/Breast: no rash or pruritis  Musculoskeletal: no arthralgias or myalgias  Neurological: no seizures or tremors    OBJECTIVE:     Vital Signs (Most Recent)  Temp: 97.4 °F (36.3 °C) (01/21/18 1101)  Pulse: 90 (01/21/18 1230)  Resp: (!) 23 (01/21/18 1230)  BP: (!) 104/55 (01/21/18 1200)  SpO2: 100 % (01/21/18 1230)    Temperature Range Min/Max (Last 24H):  Temp:  [97.4 °F (36.3 °C)-101.8 °F (38.8 °C)]     Physical Exam:  General: appears stated age  Eyes: conjunctivae/corneas clear. PERRL..  HENT: Head:normocephalic, atraumatic. Ears:bilateral TM's and external ear canals normal. Nose: Nares normal. Septum midline. Mucosa normal. No drainage or sinus tenderness., no discharge. Throat: several small ulcers of lips and oral mucosa with erythema.  Neck: supple, symmetrical, trachea midline, no JVD and thyroid not enlarged, symmetric, no tenderness/mass/nodules  Lungs:  clear to auscultation bilaterally and normal respiratory effort  Cardiovascular: Heart: regular rate and rhythm, S1, S2 normal, no murmur, click, rub or gallop. Chest Wall: no tenderness. Extremities: no cyanosis or edema, or clubbing. Pulses: 2+ and symmetric.  Abdomen/Rectal: Abdomen: soft, non-tender non-distented; bowel sounds normal; no masses,  no organomegaly. Rectal: not examined  Skin: vaginal introitus with hyperemia  Musculoskeletal:no clubbing, cyanosis    Laboratory:  CBC    Recent Labs  Lab 01/21/18  0516   WBC 0.55*   RBC 3.23*   HGB 10.2*   HCT 28.7*   PLT 32*     BMP    Recent Labs  Lab 01/21/18  1150   CO2 15*   BUN 19   CREATININE 0.8   CALCIUM 8.3*       Recent Labs  Lab 01/20/18  1751   COLORU Yellow   SPECGRAV 1.025   PHUR 5.0   PROTEINUA 2+*   BACTERIA Occasional   NITRITE Negative   LEUKOCYTESUR Trace*   UROBILINOGEN Negative   HYALINECASTS 0     Microbiology Results (last 7 days)      Procedure Component Value Units Date/Time    Urine culture [[id number]]     Order Status:  No result Specimen:  Urine     Blood culture [[id number]]     Order Status:  Sent Specimen:  Blood     Blood culture [[id number]]     Order Status:  Sent Specimen:  Blood     Urine culture **CANNOT BE ORDERED STAT** [[id number]] Collected:  01/20/18 1751    Order Status:  Completed Specimen:  Urine from Urine Updated:  01/21/18 1024     Urine Culture, Routine --     PRESUMPTIVE E COLI  10,000 - 49,999 cfu/ml  Identification and susceptibility pending            Diagnostic Results:  Labs: Reviewed  X-Ray: Reviewed    ASSESSMENT/PLAN:     Active Hospital Problems    Diagnosis  POA    *Diabetic ketoacidosis without coma associated with type 2 diabetes mellitus [E11.10]  Yes    Pancytopenia [D61.818]  Yes    Neutropenia [D70.9]  Yes    Oral thrush [B37.0]  Yes    Vaginal candidiasis [B37.3]  Yes    Acquired immunocompromised state [D84.9]  Yes    Noncompliance with medication regimen [Z91.14]  Not Applicable    AICD (automatic cardioverter/defibrillator) present [Z95.810]  Yes    CAD (coronary artery disease) [I25.10]  Yes    Hypertension [I10]  Yes    Rheumatoid arthritis [M06.9]  Yes      Resolved Hospital Problems    Diagnosis Date Resolved POA   No resolved problems to display.       1. Pancytopenia and mucositis in a patient with ra on methotrexate  This is methotrexate toxicity till proven otherwise  Dc mtx  Supplement folinic acid  Check mtx level  2. Fever sec to neutropenia  Empirical abx  panculture first  This is methotrexate toxicity until proven otherwise

## 2018-01-21 NOTE — PROVIDER TRANSFER
Ms. Doe is a 78 yo woman with NIDDM (A1c 10.2%), CKD3, CAD, and RA who presented for painful mouth lesions. She also complains of vaginal irritation. She was found to have severe neutropenia and thrombocytopenia. She had been non-compliant with her home regimen of glimepiride. She was also found to be in DKA and admitted to the ICU on an insulin infusion. Please see H&P for full detail.      She was given IV fluids and started on insulin drip and her DKA quickly resolved. She was started on basal/bolus insulin. Her hemoglobin A1c was 10.2%. She will likely need chronic insulin therapy.   Hematology and ID were consulted for pancytopenia. She was afebrile. She had mucositis. She was thought to have methotrexate toxicity and was started on leucovorin (folinic acid). MTX level pending. BCx pending.

## 2018-01-21 NOTE — H&P
[facility] Hospital Medicine  History & Physical    Patient Name: Ruthann Doe  MRN: [id number]  Admission Date: 01/21/2018  Attending Physician: Nick Kim MD, MPH      PCP:     Jesse Partida MD    CC:     Chief Complaint   Patient presents with    Mouth Lesions     very painful mouth lesions x several days.  unable to eat.       HISTORY OF PRESENT ILLNESS:     Ruthann Doe is a 77 y.o. female that (in part)  has a past medical history of Arthritis; Cardiac arrhythmia; Coronary artery disease; Diabetes mellitus type II; Pneumonia; Renal insufficiency; and Rheumatoid arthritis(714.0). Presents to [facility] Emergency Department complaining of very painful mouth lesions.  They have been present for several days duration.  This is the first episode.  No known exacerbating factors.  No relieving factors.  Radiated throughout mouth.  Also associated with painful labia majora that is edematous and tender to palpation.  Also vaginal discharge.  Patient states she has been noncompliant with her diabetic regimen at home.  She is compliant with treatment for rheumatoid arthritis which includes methotrexate and Plaquenil.     In the emergency department vaginal exam revealed edematous, erythematous, tender, labia majora with vaginal discharge.  She also had innumerable oral lesions and white plaques consistent with oral thrush.  Routine laboratory studies were obtained which demonstrated evidence of pancytopenia and severe neutropenia.  She was given IV fluids, started on insulin drip, and initiated on Diflucan.  She had evidence of diabetic ketoacidosis.  She will be admitted to the ICU with consultations to infectious disease and hematology.    Hospital medicine has been asked to admit for further evaluation and treatment.       REVIEW OF SYSTEMS:     -- Constitutional: Generalized malaise and weakness.  No fever or chills.  -- Eyes: No visual changes,  diplopia, pain, tearing, blind spots, or discharge.   -- Ears, nose, mouth, throat, and face: As above in history of present illness.  -- Respiratory: No cough, shortness of breath, hemoptysis, stridor, wheezing, or night sweats.  -- Cardiovascular: No chest pain, MENDOSA, syncope, PND, edema, cyanosis, or palpitations.   -- Gastrointestinal: No vomiting, abdominal pain, hematemesis, melena, dyspepsia, or change in bowel habits.  -- Genitourinary: No hematuria, dysuria, frequency, urgency, nocturia, polyuria, stones, or incontinence.  -- Integument/breast: No rash, pruritis, pigmentation changes, dryness, or changes in hair  -- Hematologic/lymphatic: No easy bruising or lymphadenopathy.   -- Musculoskeletal: Chronic arthralgias due to rheumatoid arthritis.  Generalized subacute muscle weakness ( Nonfocal).  -- Neurological: No seizures, headaches, incoordination, paraesthesias, ataxia, vertigo, or tremors.  -- Behavioral/Psych: No auditory or visual hallucinations, depression, or suicidal/homicidal ideations.  -- Endocrine: Markedly elevated blood glucose levels.  Noncompliance with diabetic medication regimen.  Increased cold intolerance.  Positive for polydipsia  -- Allergy/Immunologic: No recurrent infections or adverse reaction to food, insects, or difficulty breathing.    Pain Scale  0 on scale of 1 to 10    PAST MEDICAL / SURGICAL HISTORY:     Past Medical History:   Diagnosis Date    Arthritis     Rheumatoid  arthritis    Cardiac arrhythmia     Coronary artery disease     Diabetes mellitus type II     Pneumonia     as a kid, with empyema    Renal insufficiency     Rheumatoid arthritis(714.0)      Past Surgical History:   Procedure Laterality Date    CARDIAC DEFIBRILLATOR PLACEMENT      CORONARY ARTERY BYPASS GRAFT       4 para 3       HYSTERECTOMY      osteomyelitis      TONSILLECTOMY      TOTAL KNEE ARTHROPLASTY      TOTAL SHOULDER ARTHROPLASTY           FAMILY HISTORY:     Family History    Problem Relation Age of Onset    Heart disease Mother     Diabetes Mother     Cancer Maternal Uncle          SOCIAL HISTORY:     Social History   Substance Use Topics    Smoking status: Never Smoker    Smokeless tobacco: Never Used      Comment: Lives with son, extended family.  Three children.    Alcohol use Yes      Comment: only during holidays     Social History     Social History    Marital status:      Spouse name: N/A    Number of children: N/A    Years of education: N/A     Social History Main Topics    Smoking status: Never Smoker    Smokeless tobacco: Never Used      Comment: Lives with son, extended family.  Three children.    Alcohol use Yes      Comment: only during holidays    Drug use: No    Sexual activity: Not Asked     Other Topics Concern    None     Social History Narrative    None         ALLERGIES:       Review of patient's allergies indicates:   Allergen Reactions    Sulfa (sulfonamide antibiotics) Hives    Percocet [oxycodone-acetaminophen] Nausea And Vomiting         HEALTH SCREENING:     Influenza vaccine not up-to-date for this season.  Prevnar 13 pneumonia vaccine = no evidence of previous vaccination found in the medical record      HOME MEDICATIONS:     Prior to Admission medications    Medication Sig Start Date End Date Taking? Authorizing Provider   aspirin (ECOTRIN) 325 MG EC tablet Take 325 mg by mouth once daily.   2/1/11   Historical Provider, MD   clopidogrel (PLAVIX) 75 mg tablet Take 1 tablet (75 mg total) by mouth once daily. 5/9/17   Lemuel Haddad Jr., MD   fluconazole (DIFLUCAN) 150 MG Tab Take 1 tablet (150 mg total) by mouth every 72 hours. 1/20/18 1/27/18  Ben Nguyen DNP   glimepiride (AMARYL) 1 MG tablet  8/28/13   Historical Provider, MD   hydrocodone-acetaminophen 5-325mg (NORCO) 5-325 mg per tablet Take 1 tablet by mouth every 4 (four) hours as needed for Pain. 7/6/16   Mynor Ordaz MD   hydroxychloroquine (PLAQUENIL) 200 mg  tablet Take 200 mg by mouth every evening.   1/22/11   Historical Provider, MD   lidocaine HCl 2% (LIDOCAINE VISCOUS) 2 % Soln 15ml every 3h as needed for oral pain 1/20/18   Ben Nguyen DNP   methotrexate 2.5 MG Tab  4/8/15   Historical Provider, MD   metoprolol succinate (TOPROL-XL) 25 MG 24 hr tablet TAKE 1 TABLET BY MOUTH TWICE DAILY 3/1/16   Nas Wilson MD   multivitamin capsule Take 1 capsule by mouth once daily.   1/22/11   Historical Provider, MD   nystatin (MYCOSTATIN) 100,000 unit/mL suspension Take 5 mLs (500,000 Units total) by mouth 4 (four) times daily. Continue for 2 days after end of symptoms 1/20/18 1/30/18  Ben Nguyen DNP   pravastatin (PRAVACHOL) 40 MG tablet  3/26/15   Historical Provider, MD   predniSONE (DELTASONE) 5 MG tablet Take 5 mg by mouth 4 (four) times daily.  1/22/11   Historical Provider, MD   ramipril (ALTACE) 2.5 MG capsule TAKE ONE CAPSULE BY MOUTH EVERY DAY 7/17/16   Lemuel Haddad Jr., MD   tizanidine (ZANAFLEX) 2 MG tablet Take 2 tablets (4 mg total) by mouth every 6 (six) hours as needed. 3/18/16   Lemuel Haddad Jr., MD   vitamin D 185 MG Tab Take 1,000 mg by mouth once daily.   1/22/11   Historical Provider, MD          HOSPITAL MEDICATIONS:     Scheduled Meds:    aspirin  325 mg Oral Daily    clopidogrel  75 mg Oral Daily    famotidine (PF)  20 mg Intravenous Q12H    fluconazole (DIFLUCAN) IVPB  400 mg Intravenous Q24H    metoprolol succinate  25 mg Oral BID    pravastatin  40 mg Oral Daily    ramipril  2.5 mg Oral Daily     Continuous Infusions:    dextrose 5 % and 0.45 % NaCl      insulin (HUMAN R) infusion (adults) 3 Units/hr (01/21/18 0300)     PRN Meds: dextrose 10 % in water (D10W), dextrose 10 % in water (D10W), dextrose 50%, dextrose 50%, influenza, ondansetron, pneumoc 13-brad conj-dip cr(PF), sodium chloride 0.9%      PHYSICAL EXAM:     Wt Readings from Last 1 Encounters:   01/21/18 0009 51.6 kg (113 lb 12.1 oz)   01/20/18 1613 56.7  kg (125 lb)     Body mass index is 18.36 kg/m².  Vitals:    01/21/18 0300 01/21/18 0315 01/21/18 0330 01/21/18 0345   BP: 124/72      Pulse: 90 89 90 86   Resp: (!) 24 (!) 24 (!) 22 (!) 25   Temp:       TempSrc:       SpO2: 100% 100% 100% 100%   Weight:       Height:              -- General appearance: Chronically ill-appearing elderly female who is lying in bed.  Appears uncomfortable from pain.  well developed. appears approximate stated age   -- Head: normocephalic, atraumatic   -- Eyes: conjunctivae clear. Extraocular muscles intact  -- Nose: Nares normal. Septum midline.   -- Mouth/Throat: + White plaques and lesions consistent with oral thrush.  Tender oral mucosa   -- Neck: supple, symmetrical, trachea midline, no JVD and thyroid not grossly enlarged, appears symmetric  -- Lungs: clear to auscultation bilaterally. normal respiratory effort. No use of accessory muscles.   -- Chest wall: no tenderness. equal bilateral chest rise   -- Heart: Rapid rate and regular rhythm. S1, S2 normal.  no click, rub or gallop   -- Abdomen: soft, non-tender, non-distended, non-tympanic; bowel sounds normal; no masses  -- :  Vaginal discharge.  Erythematous and edematous labia majora  -- Extremities: no cyanosis, clubbing or edema.   -- Pulses: 2+ and symmetric bilaterally, that patient they are sending over his pretty low acuity.  -- Skin: Decreased skin turgor.  color normal, texture normal,   -- Neurologic: Globally decreased muscle strength and tone. No focal numbness or weakness. CNII-XII intact. Jackson coma scale: eyes open spontaneously-4, oriented & converses-5, obeys commands-6.      LABORATORY STUDIES:     Recent Results (from the past 36 hour(s))   Urinalysis    Collection Time: 01/20/18  5:51 PM   Result Value Ref Range    Specimen UA Urine, Clean Catch     Color, UA Yellow Yellow, Straw, Rachel    Appearance, UA Hazy (A) Clear    pH, UA 5.0 5.0 - 8.0    Specific Gravity, UA 1.025 1.005 - 1.030    Protein, UA 2+  (A) Negative    Glucose, UA 3+ (A) Negative    Ketones, UA 2+ (A) Negative    Bilirubin (UA) Negative Negative    Occult Blood UA 3+ (A) Negative    Nitrite, UA Negative Negative    Urobilinogen, UA Negative <2.0 EU/dL    Leukocytes, UA Trace (A) Negative   Urinalysis Microscopic    Collection Time: 01/20/18  5:51 PM   Result Value Ref Range    RBC, UA >100 (H) 0 - 4 /hpf    WBC, UA 2 0 - 5 /hpf    Bacteria, UA Occasional None-Occ /hpf    Yeast, UA None None    Squam Epithel, UA 5 /hpf    Hyaline Casts, UA 0 0-1/lpf /lpf    Microscopic Comment SEE COMMENT    Vaginal Screen Vagina    Collection Time: 01/20/18  6:05 PM   Result Value Ref Range    Trichomonas None None    Clue Cells, Wet Prep Few (A) None    Budding Yeast Few (A) None    Fungal Hyphae Rare (A) None    WBC - Vaginal Screen Occasional (A) None    Bacteria - Vaginal Screen Moderate (A) None    Wet Prep Source Vagina None   POCT glucose    Collection Time: 01/20/18  6:53 PM   Result Value Ref Range    POCT Glucose 420 (H) 70 - 110 mg/dL   POCT glucose    Collection Time: 01/20/18  9:10 PM   Result Value Ref Range    POCT Glucose 375 (H) 70 - 110 mg/dL   CBC auto differential    Collection Time: 01/20/18  9:13 PM   Result Value Ref Range    WBC 0.42 (LL) 3.90 - 12.70 K/uL    RBC 3.43 (L) 4.00 - 5.40 M/uL    Hemoglobin 10.7 (L) 12.0 - 16.0 g/dL    Hematocrit 30.9 (L) 37.0 - 48.5 %    MCV 90 82 - 98 fL    MCH 31.2 (H) 27.0 - 31.0 pg    MCHC 34.6 32.0 - 36.0 g/dL    RDW 14.3 11.5 - 14.5 %    Platelets 41 (L) 150 - 350 K/uL    MPV 12.4 9.2 - 12.9 fL    Lymph # CANCELED 1.0 - 4.8 K/uL    Mono # CANCELED 0.3 - 1.0 K/uL    Eos # CANCELED 0.0 - 0.5 K/uL    Baso # CANCELED 0.00 - 0.20 K/uL    Gran% 8.0 (L) 38.0 - 73.0 %    Lymph% 58.0 (H) 18.0 - 48.0 %    Mono% 10.0 4.0 - 15.0 %    Eosinophil% 4.0 0.0 - 8.0 %    Basophil% 0.0 0.0 - 1.9 %    Bands 20.0 %    Differential Method Manual    Comprehensive metabolic panel    Collection Time: 01/20/18  9:13 PM   Result  Value Ref Range    Sodium 127 (L) 136 - 145 mmol/L    Potassium 4.7 3.5 - 5.1 mmol/L    Chloride 96 95 - 110 mmol/L    CO2 8 (LL) 23 - 29 mmol/L    Glucose 451 (HH) 70 - 110 mg/dL    BUN, Bld 28 (H) 8 - 23 mg/dL    Creatinine 1.4 0.5 - 1.4 mg/dL    Calcium 9.3 8.7 - 10.5 mg/dL    Total Protein 6.4 6.0 - 8.4 g/dL    Albumin 2.9 (L) 3.5 - 5.2 g/dL    Total Bilirubin 0.7 0.1 - 1.0 mg/dL    Alkaline Phosphatase 55 55 - 135 U/L    AST 13 10 - 40 U/L    ALT 23 10 - 44 U/L    Anion Gap 23 (H) 8 - 16 mmol/L    eGFR if African American 42 (A) >60 mL/min/1.73 m^2    eGFR if non African American 37 (A) >60 mL/min/1.73 m^2   Osmolality    Collection Time: 01/20/18  9:13 PM   Result Value Ref Range    Osmolality 296 (H) 275 - 295 mOsm/kg   Beta - Hydroxybutyrate, Serum    Collection Time: 01/20/18  9:30 PM   Result Value Ref Range    Beta-Hydroxybutyrate 5.5 (H) 0.0 - 0.5 mmol/L   POCT glucose    Collection Time: 01/20/18 10:04 PM   Result Value Ref Range    POCT Glucose 349 (H) 70 - 110 mg/dL   ISTAT PROCEDURE    Collection Time: 01/20/18 10:24 PM   Result Value Ref Range    POC PH 7.296 (L) 7.35 - 7.45    POC PCO2 21.0 (L) 35 - 45 mmHg    POC PO2 21 (LL) 40 - 60 mmHg    POC HCO3 10.3 (L) 24 - 28 mmol/L    POC BE -14 -2 to 2 mmol/L    POC SATURATED O2 30 (L) 95 - 100 %    POC TCO2 11 (L) 24 - 29 mmol/L    Sample VENOUS     Site Other     Allens Test N/A     DelSys Room Air    POCT glucose    Collection Time: 01/21/18 12:02 AM   Result Value Ref Range    POCT Glucose 375 (H) 70 - 110 mg/dL   POCT glucose    Collection Time: 01/21/18 12:59 AM   Result Value Ref Range    POCT Glucose 343 (H) 70 - 110 mg/dL   Basic metabolic panel    Collection Time: 01/21/18  1:30 AM   Result Value Ref Range    Sodium 128 (L) 136 - 145 mmol/L    Potassium 4.3 3.5 - 5.1 mmol/L    Chloride 102 95 - 110 mmol/L    CO2 5 (LL) 23 - 29 mmol/L    Glucose 297 (H) 70 - 110 mg/dL    BUN, Bld 25 (H) 8 - 23 mg/dL    Creatinine 1.2 0.5 - 1.4 mg/dL    Calcium  8.6 (L) 8.7 - 10.5 mg/dL    Anion Gap 21 (H) 8 - 16 mmol/L    eGFR if African American 51 (A) >60 mL/min/1.73 m^2    eGFR if non African American 44 (A) >60 mL/min/1.73 m^2   POCT glucose    Collection Time: 01/21/18  2:15 AM   Result Value Ref Range    POCT Glucose 202 (H) 70 - 110 mg/dL   POCT glucose    Collection Time: 01/21/18  2:53 AM   Result Value Ref Range    POCT Glucose 176 (H) 70 - 110 mg/dL   ISTAT PROCEDURE    Collection Time: 01/21/18  3:34 AM   Result Value Ref Range    POC PH 7.364 7.35 - 7.45    POC PCO2 21.5 (LL) 35 - 45 mmHg    POC PO2 111 (H) 80 - 100 mmHg    POC HCO3 12.3 (L) 24 - 28 mmol/L    POC BE -12 -2 to 2 mmol/L    POC SATURATED O2 98 95 - 100 %    POC TCO2 13 (L) 23 - 27 mmol/L    Sample ARTERIAL     Site RR     Allens Test N/A     DelSys Room Air        Lab Results   Component Value Date    INR 0.9 10/21/2015    INR 0.9 02/01/2011    INR 0.9 01/31/2011     Lab Results   Component Value Date    HGBA1C 6.3 (H) 11/02/2012     Recent Labs      01/20/18   1853  01/20/18   2110  01/20/18   2204  01/21/18   0002  01/21/18   0059  01/21/18   0215  01/21/18   0253   POCTGLUCOSE  420*  375*  349*  375*  343*  202*  176*           MICROBIOLOGY DATA:     Urine Culture, Routine   Date Value Ref Range Status   09/29/2008 NO SIGNIFICANT GROWTH  Final       Microbiology x 7d:   Microbiology Results (last 7 days)     Procedure Component Value Units Date/Time    Urine culture **CANNOT BE ORDERED STAT** [[id number]] Collected:  01/20/18 1751    Order Status:  Sent Specimen:  Urine from Urine Updated:  01/20/18 1906            CONSULTS:     IP CONSULT TO INFECTIOUS DISEASES  IP CONSULT TO HEMATOLOGY       ASSESSMENT & PLAN:     Primary Diagnosis:  Diabetic ketoacidosis without coma associated with type 2 diabetes mellitus    Active Hospital Problems    Diagnosis  POA    *Diabetic ketoacidosis without coma associated with type 2 diabetes mellitus [E11.10]  Yes     Priority: 1 - High    Pancytopenia  [D61.818]  Yes     Priority: 2     Neutropenia [D70.9]  Yes     Priority: 2     Oral thrush [B37.0]  Yes     Priority: 3     Vaginal candidiasis [B37.3]  Yes     Priority: 3     Acquired immunocompromised state [D84.9]  Yes     Priority: 4     Noncompliance with medication regimen [Z91.14]  Not Applicable     Priority: 4     AICD (automatic cardioverter/defibrillator) present [Z95.810]  Yes    CAD (coronary artery disease) [I25.10]  Yes    Hypertension [I10]  Yes    Rheumatoid arthritis [M06.9]  Yes      Resolved Hospital Problems    Diagnosis Date Resolved POA   No resolved problems to display.           Diabetic ketoacidosis without coma associated with type 2 diabetes mellitus  Underlying etiology likely secondary to medication noncompliance as well as acute infection with oral thrush and vaginal   Candidiasis.      4 Components of DKA Management    Fluids  · Assess volume status  · Give 0.9% saline @ 1 L/h initially, then continue if hypovolemic  · Switch to 0.45% normal saline at 240-500 mL/h if corrected serum sodium level becomes normal or high  · When plasma glucose level reaches 200 mg/dL, then switch to 5% dextrose with 0.45% normal saline at 150-250 ml/hr.    Insulin  · Give regular insulin 0.1 unit/kg as IV bolus followed by 0.1 unit/kg/hr as IV infusion.  · If the plasma glucose level does not decrease by 10% in the 1st hour, give an additional bolus of 0.14 unit/kg and then resume previous infusion rate of 0.1 unit/kg/hr.  · When the plasma glucose level reaches 100mg/dL, reduce insulin rate to to 0.02 units/kg/hr and maintain the plasma glucose level between 150-200mg/dL until anion gap acidosis is resolved.    Potassium  · Assess for adequate kidney function and urine output (~50mL/h)  · If serum K < 3.3 meq/L, do not start insulin but instead give IV potassium chloride 20-30 meq/hr through central line to reach K>3.3.  · Then add 20-30meq per each liter of fluid given to keep potassium  between 4.0 to 5.0 meq/L  · If potassium is >5.2 caridad/L, then give insulin and fluids only while checking potassium every 2 hours    Correction of acidosis  · If pH is < 6.9, give sodium bicarbonate, 100mmol in 400mL of water infused over 2 hours.  · If pH is >6.9, then do not give sodium bicarbonate      =================================================    Acquired immunocompromised state  Immunocompromised state due to treatment with methotrexate and plaquenil for rheumatoid arthritis treatment.    Vaginal candidiasis  Due to immunocompromised state due to methotrexate and Plaquenil treatment or other underlying etiology causing neutropenia    Pancytopenia  · Due to methotrexate and Plaquenil treatment or other underlying etiology causing neutropenia  · Hematology consultant  · Peripheral blood smear  · HIV  · LDH  · Hold Plaquenil and methotrexate  · Neutropenia precautions  · ID consult    Oral thrush  · Due to immunocompromised state due to methotrexate and Plaquenil treatment or other underlying etiology causing neutropenia  · Infectious disease consult    Neutropenia  Management listed above with pancytopenia.      Noncompliance with medication regimen  Patient states she has not been taking her insulin regimen as prescribed at home.  This is likely the underlying etiology of her diabetic ketoacidosis.      CAD (coronary artery disease)    · No evidence of acute coronary syndrome at this time  · Maintain adequate blood pressure control  · Heart healthy diet  · Aspirin  · Statin    AICD (automatic cardioverter/defibrillator) present  · Monitor on telemetry.  No acute issues at this time.        VTE Risk Mitigation         Ordered     Medium Risk of VTE  Once      01/20/18 2347     Place CHANO hose  Until discontinued      01/20/18 2347     Place sequential compression device  Until discontinued      01/20/18 2347            Adult PRN medications available   DVT prophylaxis given       DISPOSITION:     Will admit  to the Hospital Medicine service for further evaluation and treatment.    Chart reviewed and updated where applicable.    High Risk Conditions:  Patient has a condition that poses threat to life and bodily function: Pancytopenia and diabetic ketoacidosis      ===============================================================    Nick Kim MD, MPH  Department of Hospital Medicine   [facility]  [phone] pg  (7pm - 6am)          This note is dictated using Dragon Medical 360 voice recognition software.  There are word recognition mistakes that are occasionally missed on review.

## 2018-01-21 NOTE — HOSPITAL COURSE
Ms. Doe is a 78 yo woman with NIDDM (A1c 10.2%), CKD3, CAD, and RA who presented for painful mouth lesions. She also complains of vaginal irritation. She was found to be neutropenic and thrombocytopenic. She has been non-compliant with her home regimen of glimepiride. She was found to be in DKA and admitted to the ICU on an insulin infusion.  She was given IV fluids and started on insulin drip and her DKA quickly resolved.she was on prednisone at home for RA, She was started on basal/bolus insulin. Her hemoglobin A1c was 10.2%.she was transferred to floor and  remains stable on SSI,DKA likely duo to use of Prednisone,did not require insulin at DC time and will continue with home medication Amaryl.  Hematology and ID were consulted for pancytopenia. She was afebrile. She had mucositis. She had methotrexate toxicity and was started on leucovorin (folinic acid). MTX level not elevated.MTX was hold , BCx NGTD. UCx from 1/20 with ESBL E coli but <100k cfu. Repeat UCx NGTD.ID started on empiric  Zosyn until ANC improves.ANC and pancytopenia is resolved,  Consulted PT,OT .recommneded  HH.  Her mouth mucositis much improved.  Patient and family want NH placement,consulted [facility].

## 2018-01-21 NOTE — PLAN OF CARE
Problem: Patient Care Overview  Goal: Plan of Care Review  Outcome: Ongoing (interventions implemented as appropriate)  Patient AAO x 4. Follows commands. On RA. Afib on the monitor. Afebrile. VSS. On insulin gtt. Accuchecks Q1. D5 1/2 infusing at 125. Hematology and ID consulted. On Neutropenic precautions. No skin breakdown, falls, or injuries this shift. Patient and granddaughter updated on plan of care.

## 2018-01-21 NOTE — ASSESSMENT & PLAN NOTE
· Due to methotrexate and Plaquenil treatment or other underlying etiology causing neutropenia  · Hematology consultant  · Peripheral blood smear  · HIV  · LDH  · Hold Plaquenil and methotrexate  · Neutropenia precautions  · ID consult

## 2018-01-21 NOTE — SUBJECTIVE & OBJECTIVE
Oncology Treatment Plan:   [No treatment plan]    Medications:  Continuous Infusions:  Scheduled Meds:   aspirin  325 mg Oral Daily    cefTRIAXone (ROCEPHIN) IVPB  1 g Intravenous Q24H    clopidogrel  75 mg Oral Daily    famotidine (PF)  20 mg Intravenous Q12H    insulin detemir  9 Units Subcutaneous QHS    leucovorin (WELLCOVORIN) infusion  10 mg Intravenous Q24H    metoprolol succinate  25 mg Oral BID    miconazole NITRATE 2 %   Topical (Top) BID    nystatin  500,000 Units Oral QID (WM & HS)    potassium chloride  10 mEq Intravenous Q1H    pravastatin  40 mg Oral Daily    ramipril  2.5 mg Oral Daily     PRN Meds:acetaminophen, acetaminophen, benzocaine, dextrose 10 % in water (D10W), dextrose 50%, dextrose 50%, glucagon (human recombinant), glucose, glucose, influenza, insulin aspart, ondansetron, pneumoc 13-brad conj-dip cr(PF), sodium chloride 0.9%     Review of patient's allergies indicates:   Allergen Reactions    Sulfa (sulfonamide antibiotics) Hives    Percocet [oxycodone-acetaminophen] Nausea And Vomiting        Past Medical History:   Diagnosis Date    Arthritis     Rheumatoid  arthritis    Cardiac arrhythmia     Coronary artery disease     Diabetes mellitus type II     Pneumonia     as a kid, with empyema    Renal insufficiency     Rheumatoid arthritis(714.0)      Past Surgical History:   Procedure Laterality Date    CARDIAC DEFIBRILLATOR PLACEMENT      CORONARY ARTERY BYPASS GRAFT       4 para 3       HYSTERECTOMY      osteomyelitis      TONSILLECTOMY      TOTAL KNEE ARTHROPLASTY      TOTAL SHOULDER ARTHROPLASTY       Family History     Problem Relation (Age of Onset)    Cancer Maternal Uncle    Diabetes Mother    Heart disease Mother        Social History Main Topics    Smoking status: Never Smoker    Smokeless tobacco: Never Used      Comment: Lives with son, extended family.  Three children.    Alcohol use Yes      Comment: only during holidays    Drug use: No     Sexual activity: Not on file       Review of Systems   Constitutional: Positive for fatigue. Negative for appetite change, fever and unexpected weight change.   HENT: Positive for mouth sores and trouble swallowing.    Eyes: Negative for visual disturbance.   Respiratory: Negative for cough and shortness of breath.    Cardiovascular: Negative for chest pain.   Gastrointestinal: Negative for abdominal pain and diarrhea.   Genitourinary: Negative for frequency.   Musculoskeletal: Negative for back pain.   Skin: Negative for rash.   Neurological: Positive for weakness. Negative for headaches.   Hematological: Negative for adenopathy.   Psychiatric/Behavioral: The patient is not nervous/anxious.      Objective:     Vital Signs (Most Recent):  Temp: 97.7 °F (36.5 °C) (01/21/18 1501)  Pulse: 88 (01/21/18 1600)  Resp: (!) 24 (01/21/18 1600)  BP: (!) 114/54 (01/21/18 1600)  SpO2: 100 % (01/21/18 1600) Vital Signs (24h Range):  Temp:  [97.4 °F (36.3 °C)-101.8 °F (38.8 °C)] 97.7 °F (36.5 °C)  Pulse:  [] 88  Resp:  [16-41] 24  SpO2:  [96 %-100 %] 100 %  BP: ()/(48-94) 114/54     Weight: 51.6 kg (113 lb 12.1 oz)  Body mass index is 18.36 kg/m².  Body surface area is 1.55 meters squared.      Intake/Output Summary (Last 24 hours) at 01/21/18 1748  Last data filed at 01/21/18 1600   Gross per 24 hour   Intake          1706.17 ml   Output              401 ml   Net          1305.17 ml       Physical Exam   Constitutional: She is oriented to person, place, and time. She appears well-developed.   chronically-ill appearing    HENT:   Head: Normocephalic.   Mouth/Throat: Oropharynx is clear and moist. No oropharyngeal exudate.   Eyes: Conjunctivae and lids are normal. Pupils are equal, round, and reactive to light. No scleral icterus.   Neck: Normal range of motion. Neck supple. No thyromegaly present.   Cardiovascular: Normal rate, regular rhythm and normal heart sounds.    No murmur heard.  Pulmonary/Chest: Breath  sounds normal. She has no wheezes. She has no rales.   Abdominal: Soft. Bowel sounds are normal. She exhibits no distension. There is no hepatosplenomegaly.   Musculoskeletal: Normal range of motion. She exhibits no edema or tenderness.   Neurological: She is alert and oriented to person, place, and time. No cranial nerve deficit. Coordination normal.   Skin: Skin is warm and dry. No ecchymosis, no petechiae and no rash noted. No erythema.   Psychiatric: She has a normal mood and affect.       Significant Labs:   All pertinent labs within the past 24 hours have been reviewed    Diagnostic Results:  I have reviewed and interpreted all pertinent imaging results/findings within the past 24 hours

## 2018-01-21 NOTE — ASSESSMENT & PLAN NOTE
Immunocompromised state due to treatment with methotrexate and plaquenil for rheumatoid arthritis treatment.

## 2018-01-21 NOTE — HPI
Ms. Doe is a 76 yo woman with NIDDM (A1c 10.2%), CKD3, CAD, and RA who presented for painful mouth lesions. She also complains of vaginal irritation. She was found to be neutropenic and thrombocytopenic. She has been non-compliant with her home regimen of glimepiride. She was found to be in DKA and admitted to the ICU on an insulin infusion. Please see H&P for full detail.

## 2018-01-21 NOTE — ASSESSMENT & PLAN NOTE
· No evidence of acute coronary syndrome at this time  · Maintain adequate blood pressure control  · Heart healthy diet  · Aspirin  · Statin

## 2018-01-21 NOTE — ASSESSMENT & PLAN NOTE
Due to immunocompromised state due to methotrexate and Plaquenil treatment or other underlying etiology causing neutropenia

## 2018-01-21 NOTE — ED NOTES
Pt transported via stretcher to ICU by Nurse. [email or web address] monitor in place. Bed rails up x 2, pt stable, clear breath sounds, denies pain, +productive cough. Pt's yellow colored necklace and silver colored necklace along with pt's undergarments in belonging bag. Pt granddaughter notified and aware of pt being admitted to ICU.

## 2018-01-21 NOTE — ASSESSMENT & PLAN NOTE
· Due to immunocompromised state due to methotrexate and Plaquenil treatment or other underlying etiology causing neutropenia  · Infectious disease consult

## 2018-01-21 NOTE — ASSESSMENT & PLAN NOTE
Underlying etiology likely secondary to medication noncompliance as well as acute infection with oral thrush and vaginal   Candidiasis.      4 Components of DKA Management    Fluids  · Assess volume status  · Give 0.9% saline @ 1 L/h initially, then continue if hypovolemic  · Switch to 0.45% normal saline at 240-500 mL/h if corrected serum sodium level becomes normal or high  · When plasma glucose level reaches 200 mg/dL, then switch to 5% dextrose with 0.45% normal saline at 150-250 ml/hr.    Insulin  · Give regular insulin 0.1 unit/kg as IV bolus followed by 0.1 unit/kg/hr as IV infusion.  · If the plasma glucose level does not decrease by 10% in the 1st hour, give an additional bolus of 0.14 unit/kg and then resume previous infusion rate of 0.1 unit/kg/hr.  · When the plasma glucose level reaches 100mg/dL, reduce insulin rate to to 0.02 units/kg/hr and maintain the plasma glucose level between 150-200mg/dL until anion gap acidosis is resolved.    Potassium  · Assess for adequate kidney function and urine output (~50mL/h)  · If serum K < 3.3 meq/L, do not start insulin but instead give IV potassium chloride 20-30 meq/hr through central line to reach K>3.3.  · Then add 20-30meq per each liter of fluid given to keep potassium between 4.0 to 5.0 meq/L  · If potassium is >5.2 caridad/L, then give insulin and fluids only while checking potassium every 2 hours    Correction of acidosis  · If pH is < 6.9, give sodium bicarbonate, 100mmol in 400mL of water infused over 2 hours.  · If pH is >6.9, then do not give sodium bicarbonate

## 2018-01-21 NOTE — NURSING
Pt received from the ED. Pt complained of chest pain. EKG performed. VSS. Patient oriented to unit and room. Patient and granddaughter updated on plan of care.

## 2018-01-22 LAB
ALBUMIN SERPL BCP-MCNC: 2.2 G/DL
ALP SERPL-CCNC: 51 U/L
ALT SERPL W/O P-5'-P-CCNC: 39 U/L
ANION GAP SERPL CALC-SCNC: 8 MMOL/L
ANION GAP SERPL CALC-SCNC: 8 MMOL/L
APTT BLDCRRT: 32.3 SEC
AST SERPL-CCNC: 60 U/L
BACTERIA UR CULT: NORMAL
BACTERIA UR CULT: NORMAL
BASOPHILS # BLD AUTO: 0 K/UL
BASOPHILS NFR BLD: 0 %
BILIRUB SERPL-MCNC: 0.5 MG/DL
BUN SERPL-MCNC: 12 MG/DL
BUN SERPL-MCNC: 12 MG/DL
CALCIUM SERPL-MCNC: 8.8 MG/DL
CALCIUM SERPL-MCNC: 8.8 MG/DL
CHLORIDE SERPL-SCNC: 106 MMOL/L
CHLORIDE SERPL-SCNC: 106 MMOL/L
CO2 SERPL-SCNC: 16 MMOL/L
CO2 SERPL-SCNC: 16 MMOL/L
CREAT SERPL-MCNC: 0.8 MG/DL
CREAT SERPL-MCNC: 0.8 MG/DL
DIFFERENTIAL METHOD: ABNORMAL
EOSINOPHIL # BLD AUTO: 0.2 K/UL
EOSINOPHIL NFR BLD: 19.5 %
ERYTHROCYTE [DISTWIDTH] IN BLOOD BY AUTOMATED COUNT: 14.9 %
EST. GFR  (AFRICAN AMERICAN): >60 ML/MIN/1.73 M^2
EST. GFR  (AFRICAN AMERICAN): >60 ML/MIN/1.73 M^2
EST. GFR  (NON AFRICAN AMERICAN): >60 ML/MIN/1.73 M^2
EST. GFR  (NON AFRICAN AMERICAN): >60 ML/MIN/1.73 M^2
FERRITIN SERPL-MCNC: 604 NG/ML
FIBRINOGEN PPP-MCNC: 760 MG/DL
GLUCOSE SERPL-MCNC: 70 MG/DL
GLUCOSE SERPL-MCNC: 70 MG/DL
HCT VFR BLD AUTO: 24.1 %
HGB BLD-MCNC: 8.6 G/DL
INR PPP: 1
IRON SERPL-MCNC: 12 UG/DL
LYMPHOCYTES # BLD AUTO: 0.5 K/UL
LYMPHOCYTES NFR BLD: 37.4 %
MAGNESIUM SERPL-MCNC: 1.3 MG/DL
MCH RBC QN AUTO: 31.2 PG
MCHC RBC AUTO-ENTMCNC: 35.7 G/DL
MCV RBC AUTO: 87 FL
MONOCYTES # BLD AUTO: 0.2 K/UL
MONOCYTES NFR BLD: 13 %
MTX SERPL-SCNC: <0.04 UMOL/L
NEUTROPHILS # BLD AUTO: 0.4 K/UL
NEUTROPHILS NFR BLD: 30.1 %
PHOSPHATE SERPL-MCNC: 1 MG/DL
PLATELET # BLD AUTO: 27 K/UL
PMV BLD AUTO: 11.9 FL
POCT GLUCOSE: 114 MG/DL (ref 70–110)
POCT GLUCOSE: 144 MG/DL (ref 70–110)
POCT GLUCOSE: 188 MG/DL (ref 70–110)
POCT GLUCOSE: 67 MG/DL (ref 70–110)
POCT GLUCOSE: 98 MG/DL (ref 70–110)
POCT GLUCOSE: 98 MG/DL (ref 70–110)
POTASSIUM SERPL-SCNC: 3.8 MMOL/L
POTASSIUM SERPL-SCNC: 3.8 MMOL/L
PROT SERPL-MCNC: 5.3 G/DL
PROTHROMBIN TIME: 10 SEC
RBC # BLD AUTO: 2.76 M/UL
RETICS/RBC NFR AUTO: 0.9 %
SATURATED IRON: 7 %
SODIUM SERPL-SCNC: 130 MMOL/L
SODIUM SERPL-SCNC: 130 MMOL/L
TOTAL IRON BINDING CAPACITY: 182 UG/DL
TRANSFERRIN SERPL-MCNC: 123 MG/DL
VIT B12 SERPL-MCNC: 697 PG/ML
WBC # BLD AUTO: 1.23 K/UL

## 2018-01-22 PROCEDURE — 82728 ASSAY OF FERRITIN: CPT

## 2018-01-22 PROCEDURE — 25000003 PHARM REV CODE 250: Performed by: INTERNAL MEDICINE

## 2018-01-22 PROCEDURE — 85730 THROMBOPLASTIN TIME PARTIAL: CPT

## 2018-01-22 PROCEDURE — 84630 ASSAY OF ZINC: CPT

## 2018-01-22 PROCEDURE — 25000003 PHARM REV CODE 250: Performed by: HOSPITALIST

## 2018-01-22 PROCEDURE — 82747 ASSAY OF FOLIC ACID RBC: CPT

## 2018-01-22 PROCEDURE — 63600175 PHARM REV CODE 636 W HCPCS: Performed by: INTERNAL MEDICINE

## 2018-01-22 PROCEDURE — 11000001 HC ACUTE MED/SURG PRIVATE ROOM

## 2018-01-22 PROCEDURE — 25000003 PHARM REV CODE 250: Performed by: NURSE PRACTITIONER

## 2018-01-22 PROCEDURE — 84165 PROTEIN E-PHORESIS SERUM: CPT

## 2018-01-22 PROCEDURE — 84100 ASSAY OF PHOSPHORUS: CPT

## 2018-01-22 PROCEDURE — S0028 INJECTION, FAMOTIDINE, 20 MG: HCPCS | Performed by: NURSE PRACTITIONER

## 2018-01-22 PROCEDURE — 86334 IMMUNOFIX E-PHORESIS SERUM: CPT | Mod: 26,,, | Performed by: PATHOLOGY

## 2018-01-22 PROCEDURE — 83735 ASSAY OF MAGNESIUM: CPT

## 2018-01-22 PROCEDURE — 85025 COMPLETE CBC W/AUTO DIFF WBC: CPT

## 2018-01-22 PROCEDURE — 85384 FIBRINOGEN ACTIVITY: CPT

## 2018-01-22 PROCEDURE — 85045 AUTOMATED RETICULOCYTE COUNT: CPT

## 2018-01-22 PROCEDURE — 36415 COLL VENOUS BLD VENIPUNCTURE: CPT

## 2018-01-22 PROCEDURE — 80053 COMPREHEN METABOLIC PANEL: CPT

## 2018-01-22 PROCEDURE — 86334 IMMUNOFIX E-PHORESIS SERUM: CPT

## 2018-01-22 PROCEDURE — 83540 ASSAY OF IRON: CPT

## 2018-01-22 PROCEDURE — 83921 ORGANIC ACID SINGLE QUANT: CPT

## 2018-01-22 PROCEDURE — 84165 PROTEIN E-PHORESIS SERUM: CPT | Mod: 26,,, | Performed by: PATHOLOGY

## 2018-01-22 PROCEDURE — 82525 ASSAY OF COPPER: CPT

## 2018-01-22 PROCEDURE — 82607 VITAMIN B-12: CPT

## 2018-01-22 PROCEDURE — 85610 PROTHROMBIN TIME: CPT

## 2018-01-22 PROCEDURE — 99233 SBSQ HOSP IP/OBS HIGH 50: CPT | Mod: ,,, | Performed by: INTERNAL MEDICINE

## 2018-01-22 PROCEDURE — 80299 QUANTITATIVE ASSAY DRUG: CPT

## 2018-01-22 RX ORDER — MAGNESIUM SULFATE HEPTAHYDRATE 40 MG/ML
2 INJECTION, SOLUTION INTRAVENOUS ONCE
Status: COMPLETED | OUTPATIENT
Start: 2018-01-22 | End: 2018-01-22

## 2018-01-22 RX ADMIN — METOPROLOL SUCCINATE 25 MG: 25 TABLET, EXTENDED RELEASE ORAL at 09:01

## 2018-01-22 RX ADMIN — RAMIPRIL 2.5 MG: 2.5 CAPSULE ORAL at 08:01

## 2018-01-22 RX ADMIN — PIPERACILLIN AND TAZOBACTAM 4.5 G: 4; .5 INJECTION, POWDER, LYOPHILIZED, FOR SOLUTION INTRAVENOUS; PARENTERAL at 12:01

## 2018-01-22 RX ADMIN — DEXTROSE MONOHYDRATE 12.5 G: 500 INJECTION PARENTERAL at 06:01

## 2018-01-22 RX ADMIN — METOPROLOL SUCCINATE 25 MG: 25 TABLET, EXTENDED RELEASE ORAL at 08:01

## 2018-01-22 RX ADMIN — NYSTATIN 500000 UNITS: 100000 SUSPENSION ORAL at 08:01

## 2018-01-22 RX ADMIN — MICONAZOLE NITRATE: 20.6 POWDER TOPICAL at 08:01

## 2018-01-22 RX ADMIN — NYSTATIN 500000 UNITS: 100000 SUSPENSION ORAL at 12:01

## 2018-01-22 RX ADMIN — SODIUM PHOSPHATE, MONOBASIC, MONOHYDRATE 30 MMOL: 276; 142 INJECTION, SOLUTION INTRAVENOUS at 07:01

## 2018-01-22 RX ADMIN — CLOPIDOGREL BISULFATE 75 MG: 75 TABLET ORAL at 08:01

## 2018-01-22 RX ADMIN — PIPERACILLIN AND TAZOBACTAM 4.5 G: 4; .5 INJECTION, POWDER, LYOPHILIZED, FOR SOLUTION INTRAVENOUS; PARENTERAL at 11:01

## 2018-01-22 RX ADMIN — FAMOTIDINE 20 MG: 10 INJECTION, SOLUTION INTRAVENOUS at 09:01

## 2018-01-22 RX ADMIN — LEUCOVORIN CALCIUM 10 MG: 50 INJECTION, POWDER, LYOPHILIZED, FOR SOLUTION INTRAMUSCULAR; INTRAVENOUS at 09:01

## 2018-01-22 RX ADMIN — PRAVASTATIN SODIUM 40 MG: 40 TABLET ORAL at 08:01

## 2018-01-22 RX ADMIN — MAGNESIUM SULFATE IN WATER 2 G: 40 INJECTION, SOLUTION INTRAVENOUS at 06:01

## 2018-01-22 RX ADMIN — ASPIRIN 325 MG: 325 TABLET, DELAYED RELEASE ORAL at 09:01

## 2018-01-22 RX ADMIN — NYSTATIN 500000 UNITS: 100000 SUSPENSION ORAL at 05:01

## 2018-01-22 RX ADMIN — FAMOTIDINE 20 MG: 10 INJECTION, SOLUTION INTRAVENOUS at 08:01

## 2018-01-22 NOTE — PROGRESS NOTES
[facility]  Hematology/Oncology  Progress Note    Patient Name: Ruthann Doe  Admission Date: 1/20/2018  Hospital Length of Stay: 2 days  Code Status: Full Code     Subjective:     HPI:  Pt is a 77 y/o female with pmhx of severe RA , CAD, DMType 2, presents to ED with c/o painful mouth and vaginal  ulcers and generalized weakness past week. Pt is a poor historian. She is followed by Rheumatology at an outside facility. She is taking MTX and plaquenil. She reports she ran out of daily folate supplementation. She reports episodes of hematuria. No dysuria. Pt with febrile episode with temp 101. She is being treated with diflucan.No SOB/CP.CBC reveals wbc .55  Hb 10. 2g/dl Hct 28.7% plt ct 32k. . No N/V. Family members ( form out of town)  at bedside. Pt lives alone.     Interval History: Pt asleep.Pt disoriented overnight. She continues with poor intake.     Oncology Treatment Plan:   [No treatment plan]    Medications:  Continuous Infusions:  Scheduled Meds:   aspirin  325 mg Oral Daily    clopidogrel  75 mg Oral Daily    famotidine (PF)  20 mg Intravenous Q12H    insulin detemir  9 Units Subcutaneous QHS    leucovorin (WELLCOVORIN) infusion  10 mg Intravenous Q24H    metoprolol succinate  25 mg Oral BID    miconazole NITRATE 2 %   Topical (Top) BID    nystatin  500,000 Units Oral QID (WM & HS)    piperacillin-tazobactam 4.5 g in dextrose 5 % 100 mL IVPB (ready to mix system)  4.5 g Intravenous Q8H    pravastatin  40 mg Oral Daily    ramipril  2.5 mg Oral Daily    sodium phosphate IVPB  30 mmol Intravenous Once     PRN Meds:acetaminophen, acetaminophen, benzocaine, dextrose 10 % in water (D10W), dextrose 50%, dextrose 50%, glucagon (human recombinant), glucose, glucose, influenza, insulin aspart, ondansetron, pneumoc 13-brad conj-dip cr(PF), sodium chloride 0.9%     Review of Systems   Unable to perform ROS: Other   Pt asleep   Objective:     Vital Signs (Most  Recent):  Temp: 98.3 °F (36.8 °C) (01/22/18 0715)  Pulse: 85 (01/22/18 1000)  Resp: 19 (01/22/18 1000)  BP: (!) 115/57 (01/22/18 1000)  SpO2: 100 % (01/22/18 1000) Vital Signs (24h Range):  Temp:  [97.7 °F (36.5 °C)-98.4 °F (36.9 °C)] 98.3 °F (36.8 °C)  Pulse:  [84-97] 85  Resp:  [17-71] 19  SpO2:  [72 %-100 %] 100 %  BP: ()/(44-82) 115/57     Weight: 54.7 kg (120 lb 9.5 oz)  Body mass index is 19.46 kg/m².  Body surface area is 1.6 meters squared.      Intake/Output Summary (Last 24 hours) at 01/22/18 1241  Last data filed at 01/22/18 0737   Gross per 24 hour   Intake             1560 ml   Output             1600 ml   Net              -40 ml       Physical Exam   Constitutional: She appears well-developed.   chronically-ill appearing    HENT:   Head: Normocephalic.   Cardiovascular: Normal rate, regular rhythm and normal heart sounds.    No murmur heard.  Pulmonary/Chest: Breath sounds normal. She has no wheezes. She has no rales.   Abdominal: Soft. Bowel sounds are normal. There is no hepatosplenomegaly.   Musculoskeletal: She exhibits no edema or tenderness.   Neurological:   Resting comfortably   Skin: Skin is warm and dry. No ecchymosis, no petechiae and no rash noted. No erythema.       Significant Labs:   All pertinent labs within the past 24 hours have been reviewed  CBC not done today  Results for WILFRIDO PERALES (MRN [id number]) as of 1/22/2018 12:43   Ref. Range 1/22/2018 06:56   Protime Latest Ref Range: 9.0 - 12.5 sec 10.0   Coumadin Monitoring INR Latest Ref Range: 0.8 - 1.2  1.0   aPTT Latest Ref Range: 21.0 - 32.0 sec 32.3 (H)   Fibrinogen Latest Ref Range: 182 - 366 mg/dL 760 (H)     Results for WILFRIDO PERALES (MRN [id number]) as of 1/22/2018 12:43   Ref. Range 1/22/2018 04:32   Iron Latest Ref Range: 30 - 160 ug/dL 12 (L)   TIBC Latest Ref Range: 250 - 450 ug/dL 182 (L)   Saturated Iron Latest Ref Range: 20 - 50 % 7 (L)   Transferrin Latest Ref Range: 200 - 375 mg/dL 123 (L)    Ferritin Latest Ref Range: 20.0 - 300.0 ng/mL 604 (H)   Vitamin B-12 Latest Ref Range: 210 - 950 pg/mL 697   Retic Latest Ref Range: 0.5 - 2.5 % 0.9       Diagnostic Results:  I have reviewed and interpreted all pertinent imaging results/findings within the past 24 hours    Assessment/Plan:     Oral thrush    Diflucan started         Neutropenia    Neutropenic precautions  Likely secondary to drug-toxicity     Review PBS        Pancytopenia    Likely drug-induced, MTX induced  HOLD MTX and Plaquenil   Transfuse Hb if <7g/dl  Transfuse plts if <20k w/bleeding or <10k    Review PBS  Coag parameters do not reveal evid of DIC  No evid of B12 deficiency  HIV neg    CBC pending         Rheumatoid arthritis    MTX and Plaquenil on hold  Leucovorin rescue started  MTX level pending              Ashley Covington MD  Hematology/Oncology  [facility] [place]

## 2018-01-22 NOTE — PROGRESS NOTES
Dr. Cabral paged re critical labs and also 101.8 temp. Dr.Floyd Sandy paged again at 0746 she returned call at 0800 and was given critical values and also updated on temp. Order for tylenol only.

## 2018-01-22 NOTE — SUBJECTIVE & OBJECTIVE
Interval History: Pt asleep.Pt disoriented overnight. She continues with poor intake.     Oncology Treatment Plan:   [No treatment plan]    Medications:  Continuous Infusions:  Scheduled Meds:   aspirin  325 mg Oral Daily    clopidogrel  75 mg Oral Daily    famotidine (PF)  20 mg Intravenous Q12H    insulin detemir  9 Units Subcutaneous QHS    leucovorin (WELLCOVORIN) infusion  10 mg Intravenous Q24H    metoprolol succinate  25 mg Oral BID    miconazole NITRATE 2 %   Topical (Top) BID    nystatin  500,000 Units Oral QID (WM & HS)    piperacillin-tazobactam 4.5 g in dextrose 5 % 100 mL IVPB (ready to mix system)  4.5 g Intravenous Q8H    pravastatin  40 mg Oral Daily    ramipril  2.5 mg Oral Daily    sodium phosphate IVPB  30 mmol Intravenous Once     PRN Meds:acetaminophen, acetaminophen, benzocaine, dextrose 10 % in water (D10W), dextrose 50%, dextrose 50%, glucagon (human recombinant), glucose, glucose, influenza, insulin aspart, ondansetron, pneumoc 13-brad conj-dip cr(PF), sodium chloride 0.9%     Review of Systems   Unable to perform ROS: Other   Pt asleep   Objective:     Vital Signs (Most Recent):  Temp: 98.3 °F (36.8 °C) (01/22/18 0715)  Pulse: 85 (01/22/18 1000)  Resp: 19 (01/22/18 1000)  BP: (!) 115/57 (01/22/18 1000)  SpO2: 100 % (01/22/18 1000) Vital Signs (24h Range):  Temp:  [97.7 °F (36.5 °C)-98.4 °F (36.9 °C)] 98.3 °F (36.8 °C)  Pulse:  [84-97] 85  Resp:  [17-71] 19  SpO2:  [72 %-100 %] 100 %  BP: ()/(44-82) 115/57     Weight: 54.7 kg (120 lb 9.5 oz)  Body mass index is 19.46 kg/m².  Body surface area is 1.6 meters squared.      Intake/Output Summary (Last 24 hours) at 01/22/18 1241  Last data filed at 01/22/18 0737   Gross per 24 hour   Intake             1560 ml   Output             1600 ml   Net              -40 ml       Physical Exam   Constitutional: She appears well-developed.   chronically-ill appearing    HENT:   Head: Normocephalic.   Cardiovascular: Normal rate, regular  rhythm and normal heart sounds.    No murmur heard.  Pulmonary/Chest: Breath sounds normal. She has no wheezes. She has no rales.   Abdominal: Soft. Bowel sounds are normal. There is no hepatosplenomegaly.   Musculoskeletal: She exhibits no edema or tenderness.   Neurological:   Resting comfortably   Skin: Skin is warm and dry. No ecchymosis, no petechiae and no rash noted. No erythema.       Significant Labs:   All pertinent labs within the past 24 hours have been reviewed  CBC not done today  Results for WILFRIDO PERALES (MRN [id number]) as of 1/22/2018 12:43   Ref. Range 1/22/2018 06:56   Protime Latest Ref Range: 9.0 - 12.5 sec 10.0   Coumadin Monitoring INR Latest Ref Range: 0.8 - 1.2  1.0   aPTT Latest Ref Range: 21.0 - 32.0 sec 32.3 (H)   Fibrinogen Latest Ref Range: 182 - 366 mg/dL 760 (H)     Results for WILFRIDO PERALES (MRN [id number]) as of 1/22/2018 12:43   Ref. Range 1/22/2018 04:32   Iron Latest Ref Range: 30 - 160 ug/dL 12 (L)   TIBC Latest Ref Range: 250 - 450 ug/dL 182 (L)   Saturated Iron Latest Ref Range: 20 - 50 % 7 (L)   Transferrin Latest Ref Range: 200 - 375 mg/dL 123 (L)   Ferritin Latest Ref Range: 20.0 - 300.0 ng/mL 604 (H)   Vitamin B-12 Latest Ref Range: 210 - 950 pg/mL 697   Retic Latest Ref Range: 0.5 - 2.5 % 0.9       Diagnostic Results:  I have reviewed and interpreted all pertinent imaging results/findings within the past 24 hours

## 2018-01-22 NOTE — PLAN OF CARE
Problem: Patient Care Overview  Goal: Plan of Care Review  Outcome: Ongoing (interventions implemented as appropriate)  Patient remains in ICU. VSS. Afebrile. Intermittently disoriented and attempting to get out of bed. Reoriented as needed. No falls or new skin breakdown throughout shift. Groin remains reddened and tender. Orders to move to Med-tele. Plan of care reviewed with patient and daughter.

## 2018-01-22 NOTE — PROGRESS NOTES
Pt's sister in law Jin states that she received a call from neighbor and neighbor states that she and her  went into pt's home to clean before pt returns home and that they removed 3 bags of dog feces from home today. SW notified and also Dr. Cabral notified.

## 2018-01-22 NOTE — ASSESSMENT & PLAN NOTE
Likely drug-induced, MTX induced  HOLD MTX and Plaquenil   Transfuse Hb if <7g/dl  Transfuse plts if <20k w/bleeding or <10k    Review PBS  B12, MMA, rbc folate, zinc, copper, PT/PTT  HIV neg

## 2018-01-22 NOTE — CONSULTS
Consult Note  Infectious Disease    Consult Requested By: Radha Cabral MD    Reason for Consult: pancytopenia and fever    SUBJECTIVE:     History of Present Illness:  Patient is a 77 y.o. female presents with mouth ulcers. She has severe RA and has been on methotrexate and plaquenil for 3 years. She does not recall when her last visit to rheumatology occurred. She is now here with painful mouth ulcers and vaginal ulcers since 1-2 weeks. Her cbc shows a wbc of 0.5, platelets of 32 and normal renal function. She has run a fever of 101.there are cultures of blood. She has been placed on iv diflucan. hiv is negative.    Past Medical History:   Diagnosis Date    Arthritis     Rheumatoid  arthritis    Cardiac arrhythmia     Coronary artery disease     Diabetes mellitus type II     Pneumonia     as a kid, with empyema    Renal insufficiency     Rheumatoid arthritis(714.0)      Past Surgical History:   Procedure Laterality Date    CARDIAC DEFIBRILLATOR PLACEMENT      CORONARY ARTERY BYPASS GRAFT       4 para 3       HYSTERECTOMY      osteomyelitis      TONSILLECTOMY      TOTAL KNEE ARTHROPLASTY      TOTAL SHOULDER ARTHROPLASTY       Family History   Problem Relation Age of Onset    Heart disease Mother     Diabetes Mother     Cancer Maternal Uncle      Social History   Substance Use Topics    Smoking status: Never Smoker    Smokeless tobacco: Never Used      Comment: Lives with son, extended family.  Three children.    Alcohol use Yes      Comment: only during holidays       Review of patient's allergies indicates:   Allergen Reactions    Sulfa (sulfonamide antibiotics) Hives    Percocet [oxycodone-acetaminophen] Nausea And Vomiting        Antibiotics     Start     Stop Route Frequency Ordered    18 1215  piperacillin-tazobactam 4.5 g in dextrose 5 % 100 mL IVPB (ready to mix system)      -- IV Every 8 hours (non-standard times) 18 1103          Review of  Systems:  Constitutional: no fever or chills  Eyes: no visual changes  ENT: no nasal congestion or sore throat  Respiratory: no cough or shortness of breath  Cardiovascular: no chest pain or palpitations  Gastrointestinal: no nausea or vomiting, no abdominal pain or change in bowel habits  Genitourinary: no hematuria or dysuria  Integument/Breast: no rash or pruritis  Musculoskeletal: no arthralgias or myalgias  Neurological: no seizures or tremors    OBJECTIVE:     Vital Signs (Most Recent)  Temp: 97.6 °F (36.4 °C) (01/22/18 1547)  Pulse: 80 (01/22/18 1547)  Resp: 20 (01/22/18 1547)  BP: (!) 104/50 (01/22/18 1547)  SpO2: 100 % (01/22/18 1547)    Temperature Range Min/Max (Last 24H):  Temp:  [97.6 °F (36.4 °C)-98.4 °F (36.9 °C)]     Physical Exam:  General: appears stated age  Eyes: conjunctivae/corneas clear. PERRL..  HENT: Head:normocephalic, atraumatic. Ears:bilateral TM's and external ear canals normal. Nose: Nares normal. Septum midline. Mucosa normal. No drainage or sinus tenderness., no discharge. Throat: several small ulcers of lips and oral mucosa with erythema.  Neck: supple, symmetrical, trachea midline, no JVD and thyroid not enlarged, symmetric, no tenderness/mass/nodules  Lungs:  clear to auscultation bilaterally and normal respiratory effort  Cardiovascular: Heart: regular rate and rhythm, S1, S2 normal, no murmur, click, rub or gallop. Chest Wall: no tenderness. Extremities: no cyanosis or edema, or clubbing. Pulses: 2+ and symmetric.  Abdomen/Rectal: Abdomen: soft, non-tender non-distented; bowel sounds normal; no masses,  no organomegaly. Rectal: not examined  Skin: vaginal introitus with hyperemia  Musculoskeletal:no clubbing, cyanosis    Laboratory:  CBC    Recent Labs  Lab 01/22/18  0432   WBC 1.23*   RBC 2.76*   HGB 8.6*   HCT 24.1*   PLT 27*     BMP    Recent Labs  Lab 01/22/18  0432   CO2 16*  16*   BUN 12  12   CREATININE 0.8  0.8   CALCIUM 8.8  8.8       Recent Labs  Lab 01/21/18  0984    COLORU Yellow   SPECGRAV 1.020   PHUR 5.0   PROTEINUA 1+*   BACTERIA Rare   NITRITE Negative   LEUKOCYTESUR Negative   UROBILINOGEN Negative   HYALINECASTS 0     Microbiology Results (last 7 days)     Procedure Component Value Units Date/Time    Urine culture [[id number]] Collected:  01/21/18 1450    Order Status:  Completed Specimen:  Urine from Urine, Catheterized Updated:  01/22/18 1048     Urine Culture, Routine No growth to date    Urine culture **CANNOT BE ORDERED STAT** [[id number]]  (Susceptibility) Collected:  01/20/18 1751    Order Status:  Completed Specimen:  Urine from Urine Updated:  01/22/18 0913     Urine Culture, Routine Results called to and read back by:Tara Weber-ICU 09:13  01/22/2018     Urine Culture, Routine --     ESCHERICHIA COLI ESBL  10,000 - 49,999 cfu/ml      Blood culture [[id number]] Collected:  01/21/18 1505    Order Status:  Completed Specimen:  Blood Updated:  01/22/18 0312     Blood Culture, Routine No Growth to date    Blood culture [[id number]] Collected:  01/21/18 1500    Order Status:  Completed Specimen:  Blood Updated:  01/22/18 0312     Blood Culture, Routine No Growth to date          Diagnostic Results:  Labs: Reviewed  X-Ray: Reviewed    ASSESSMENT/PLAN:     Active Hospital Problems    Diagnosis  POA    *Diabetic ketoacidosis without coma associated with type 2 diabetes mellitus [E11.10]  Yes    Pancytopenia [D61.818]  Yes    Neutropenia [D70.9]  Yes    Oral thrush [B37.0]  Yes    Vaginal candidiasis [B37.3]  Yes    Acquired immunocompromised state [D84.9]  Yes    Noncompliance with medication regimen [Z91.14]  Not Applicable    AICD (automatic cardioverter/defibrillator) present [Z95.810]  Yes    CAD (coronary artery disease) [I25.10]  Yes    Hypertension [I10]  Yes    Rheumatoid arthritis [M06.9]  Yes      Resolved Hospital Problems    Diagnosis Date Resolved POA   No resolved problems to display.       1. Pancytopenia and mucositis in a patient with ra on  methotrexate  This is methotrexate toxicity till proven otherwise  Dc mtx  Supplement folinic acid  Check mtx level  2. Fever sec to neutropenia, single fever spike  Empirical abx-zosyn  panculture first  Urine culture with [id number] esbl ecoli on 1/20/18 and neg 1/21/18- leslie to zosyn is 1:16  Wbc slowly climbing with folinic acid  Suspect contaminated urine culture  Will dc abx once anc is > 1000

## 2018-01-22 NOTE — EICU
eICU Note :    Called by the [facility]:    Problem: Phos level 1 mg/dL and Mag 1.3    Pertinent History and labs reviewed :77-year-old fstory of coronary artery disease,      Treatment /Intervention given:  Replaced sodium phosphate 30 mmol IV piggyback over 12 hours and Magnesium sulfate2 g over 2 hours      Ayaka Lindsay M.D  eICU Physician

## 2018-01-22 NOTE — NURSING TRANSFER
Nursing Transfer Note      1/22/2018  1505    Transfer to  406A    Transfer via bed    Transfer with cardiac monitor    Transported by ICU RN/transporter    Medicines sent: yes    Chart send with patient: yes    Notified: daughter-Ayse    Patient reassessed at: 01/22/2018 1400

## 2018-01-22 NOTE — PLAN OF CARE
Problem: Patient Care Overview  Goal: Plan of Care Review  Outcome: Ongoing (interventions implemented as appropriate)  Pt has remained safe and free of injury today. Pt has no new skin breakdown noted.Pt with SR most of day but has had some afib at times.Pt with poor po intake related to mouth sores. Pt has remained on neutropenic precautions. Pt states that she is feeling better.

## 2018-01-22 NOTE — CONSULTS
[facility]  Hematology/Oncology  Consult Note    Patient Name: Ruthann Doe  MRN: [id number]  Admission Date: 1/20/2018  Hospital Length of Stay: 1 days  Code Status: Full Code   Attending Provider: Radha Cabral MD  Consulting Provider: Ashley Covington MD  Primary Care Physician: Jesse Partida MD  Principal Problem:Diabetic ketoacidosis without coma associated with type 2 diabetes mellitus    Consults  Subjective:   REASON FOR CONSULTATION: Pancytopenia  HPI:  Pt is a 77 y/o female with pmhx of severe RA , CAD, DMType 2, presents to ED with c/o painful mouth and vaginal  ulcers and generalized weakness past week. Pt is a poor historian. She is followed by Rheumatology at an outside facility. She is taking MTX and plaquenil. She reports she ran out of daily folate supplementation. She reports episodes of hematuria. No dysuria. Pt with febrile episode with temp 101. She is being treated with diflucan.No SOB/CP.CBC reveals wbc .55  Hb 10. 2g/dl Hct 28.7% plt ct 32k. . No N/V. Family members ( form out of town)  at bedside. Pt lives alone.     Oncology Treatment Plan:   [No treatment plan]    Medications:  Continuous Infusions:  Scheduled Meds:   aspirin  325 mg Oral Daily    cefTRIAXone (ROCEPHIN) IVPB  1 g Intravenous Q24H    clopidogrel  75 mg Oral Daily    famotidine (PF)  20 mg Intravenous Q12H    insulin detemir  9 Units Subcutaneous QHS    leucovorin (WELLCOVORIN) infusion  10 mg Intravenous Q24H    metoprolol succinate  25 mg Oral BID    miconazole NITRATE 2 %   Topical (Top) BID    nystatin  500,000 Units Oral QID (WM & HS)    potassium chloride  10 mEq Intravenous Q1H    pravastatin  40 mg Oral Daily    ramipril  2.5 mg Oral Daily     PRN Meds:acetaminophen, acetaminophen, benzocaine, dextrose 10 % in water (D10W), dextrose 50%, dextrose 50%, glucagon (human recombinant), glucose, glucose, influenza, insulin aspart, ondansetron, pneumoc 13-brad conj-dip  cr(PF), sodium chloride 0.9%     Review of patient's allergies indicates:   Allergen Reactions    Sulfa (sulfonamide antibiotics) Hives    Percocet [oxycodone-acetaminophen] Nausea And Vomiting        Past Medical History:   Diagnosis Date    Arthritis     Rheumatoid  arthritis    Cardiac arrhythmia     Coronary artery disease     Diabetes mellitus type II     Pneumonia     as a kid, with empyema    Renal insufficiency     Rheumatoid arthritis(714.0)      Past Surgical History:   Procedure Laterality Date    CARDIAC DEFIBRILLATOR PLACEMENT      CORONARY ARTERY BYPASS GRAFT       4 para 3       HYSTERECTOMY      osteomyelitis      TONSILLECTOMY      TOTAL KNEE ARTHROPLASTY      TOTAL SHOULDER ARTHROPLASTY       Family History     Problem Relation (Age of Onset)    Cancer Maternal Uncle    Diabetes Mother    Heart disease Mother        Social History Main Topics    Smoking status: Never Smoker    Smokeless tobacco: Never Used      Comment: Lives with son, extended family.  Three children.    Alcohol use Yes      Comment: only during holidays    Drug use: No    Sexual activity: Not on file       Review of Systems   Constitutional: Positive for fatigue. Negative for appetite change, fever and unexpected weight change.   HENT: Positive for mouth sores and trouble swallowing.    Eyes: Negative for visual disturbance.   Respiratory: Negative for cough and shortness of breath.    Cardiovascular: Negative for chest pain.   Gastrointestinal: Negative for abdominal pain and diarrhea.   Genitourinary: Negative for frequency.   Musculoskeletal: Negative for back pain.   Skin: Negative for rash.   Neurological: Positive for weakness. Negative for headaches.   Hematological: Negative for adenopathy.   Psychiatric/Behavioral: The patient is not nervous/anxious.      Objective:     Vital Signs (Most Recent):  Temp: 97.7 °F (36.5 °C) (18 1501)  Pulse: 88 (18 1600)  Resp: (!) 24 (18  1600)  BP: (!) 114/54 (01/21/18 1600)  SpO2: 100 % (01/21/18 1600) Vital Signs (24h Range):  Temp:  [97.4 °F (36.3 °C)-101.8 °F (38.8 °C)] 97.7 °F (36.5 °C)  Pulse:  [] 88  Resp:  [16-41] 24  SpO2:  [96 %-100 %] 100 %  BP: ()/(48-94) 114/54     Weight: 51.6 kg (113 lb 12.1 oz)  Body mass index is 18.36 kg/m².  Body surface area is 1.55 meters squared.      Intake/Output Summary (Last 24 hours) at 01/21/18 1748  Last data filed at 01/21/18 1600   Gross per 24 hour   Intake          1706.17 ml   Output              401 ml   Net          1305.17 ml       Physical Exam   Constitutional: She is oriented to person, place, and time. She appears well-developed.   chronically-ill appearing    HENT:   Head: Normocephalic.   Mouth/Throat: Oropharynx is clear and moist. No oropharyngeal exudate.   Eyes: Conjunctivae and lids are normal. Pupils are equal, round, and reactive to light. No scleral icterus.   Neck: Normal range of motion. Neck supple. No thyromegaly present.   Cardiovascular: Normal rate, regular rhythm and normal heart sounds.    No murmur heard.  Pulmonary/Chest: Breath sounds normal. She has no wheezes. She has no rales.   Abdominal: Soft. Bowel sounds are normal. She exhibits no distension. There is no hepatosplenomegaly.   Musculoskeletal: Normal range of motion. She exhibits no edema or tenderness.   Neurological: She is alert and oriented to person, place, and time. No cranial nerve deficit. Coordination normal.   Skin: Skin is warm and dry. No ecchymosis, no petechiae and no rash noted. No erythema.   Psychiatric: She has a normal mood and affect.       Significant Labs:   All pertinent labs within the past 24 hours have been reviewed    Diagnostic Results:  I have reviewed and interpreted all pertinent imaging results/findings within the past 24 hours    Assessment/Plan:     Oral thrush    Diflucan started         Neutropenia, severe    Neutropenic precautions  Likely secondary to drug-toxicity      Review PBS        Pancytopenia    Likely drug-induced, MTX induced  HOLD MTX and Plaquenil   Transfuse Hb if <7g/dl  Transfuse plts if <20k w/bleeding or <10k    Review PBS  B12, MMA, rbc folate, zinc, copper, PT/PTT  HIV neg        Rheumatoid arthritis    MTX and Plaquenil on hold  Leucovorin rescue started  MTX level planned            Thank you for your consult.   Ashley Covington MD  Hematology/Oncology  [facility]

## 2018-01-22 NOTE — HPI
Pt is a 77 y/o female with pmhx of severe RA , CAD, DMType 2, presents to ED with c/o painful mouth and vaginal  ulcers and generalized weakness past week. Pt is a poor historian. She is followed by Rheumatology at an outside facility. She is taking MTX and plaquenil. She reports she ran out of daily folate supplementation. She reports episodes of hematuria. No dysuria. Pt with febrile episode with temp 101. She is being treated with diflucan.No SOB/CP.CBC reveals wbc .55  Hb 10. 2g/dl Hct 28.7% plt ct 32k. . No N/V. Family members ( form out of town)  at bedside. Pt lives alone.

## 2018-01-22 NOTE — ASSESSMENT & PLAN NOTE
Likely drug-induced, MTX induced  HOLD MTX and Plaquenil   Transfuse Hb if <7g/dl  Transfuse plts if <20k w/bleeding or <10k    Review PBS  Coag parameters do not reveal evid of DIC  No evid of B12 deficiency  HIV neg    CBC pending

## 2018-01-23 LAB
ALBUMIN SERPL BCP-MCNC: 1.9 G/DL
ALBUMIN SERPL ELPH-MCNC: 2.25 G/DL
ALP SERPL-CCNC: 56 U/L
ALPHA1 GLOB SERPL ELPH-MCNC: 0.68 G/DL
ALPHA2 GLOB SERPL ELPH-MCNC: 0.92 G/DL
ALT SERPL W/O P-5'-P-CCNC: 90 U/L
ANION GAP SERPL CALC-SCNC: 8 MMOL/L
ANION GAP SERPL CALC-SCNC: 8 MMOL/L
AST SERPL-CCNC: 150 U/L
B-GLOBULIN SERPL ELPH-MCNC: 0.66 G/DL
BACTERIA UR CULT: NO GROWTH
BASOPHILS # BLD AUTO: 0.01 K/UL
BASOPHILS NFR BLD: 0.4 %
BILIRUB SERPL-MCNC: 0.5 MG/DL
BUN SERPL-MCNC: 8 MG/DL
BUN SERPL-MCNC: 8 MG/DL
CALCIUM SERPL-MCNC: 8.3 MG/DL
CALCIUM SERPL-MCNC: 8.3 MG/DL
CHLORIDE SERPL-SCNC: 105 MMOL/L
CHLORIDE SERPL-SCNC: 105 MMOL/L
CO2 SERPL-SCNC: 20 MMOL/L
CO2 SERPL-SCNC: 20 MMOL/L
COPPER SERPL-MCNC: 1165 UG/L (ref 810–1990)
CREAT SERPL-MCNC: 0.7 MG/DL
CREAT SERPL-MCNC: 0.7 MG/DL
DIFFERENTIAL METHOD: ABNORMAL
EOSINOPHIL # BLD AUTO: 0.4 K/UL
EOSINOPHIL NFR BLD: 16.6 %
ERYTHROCYTE [DISTWIDTH] IN BLOOD BY AUTOMATED COUNT: 15 %
EST. GFR  (AFRICAN AMERICAN): >60 ML/MIN/1.73 M^2
EST. GFR  (AFRICAN AMERICAN): >60 ML/MIN/1.73 M^2
EST. GFR  (NON AFRICAN AMERICAN): >60 ML/MIN/1.73 M^2
EST. GFR  (NON AFRICAN AMERICAN): >60 ML/MIN/1.73 M^2
FOLATE RBC-MCNC: 739 NG/ML
GAMMA GLOB SERPL ELPH-MCNC: 0.28 G/DL
GLUCOSE SERPL-MCNC: 67 MG/DL
GLUCOSE SERPL-MCNC: 67 MG/DL
HCT VFR BLD AUTO: 20.9 %
HGB BLD-MCNC: 7.5 G/DL
LYMPHOCYTES # BLD AUTO: 0.8 K/UL
LYMPHOCYTES NFR BLD: 29.8 %
MCH RBC QN AUTO: 30.9 PG
MCHC RBC AUTO-ENTMCNC: 35.9 G/DL
MCV RBC AUTO: 86 FL
MONOCYTES # BLD AUTO: 0.8 K/UL
MONOCYTES NFR BLD: 30.2 %
MTX SERPL-SCNC: <0.04 UMOL/L
NEUTROPHILS # BLD AUTO: 0.6 K/UL
NEUTROPHILS NFR BLD: 23 %
PATH REV BLD -IMP: NORMAL
PLATELET # BLD AUTO: 47 K/UL
PLATELET BLD QL SMEAR: ABNORMAL
PMV BLD AUTO: 12.7 FL
POCT GLUCOSE: 105 MG/DL (ref 70–110)
POCT GLUCOSE: 134 MG/DL (ref 70–110)
POCT GLUCOSE: 67 MG/DL (ref 70–110)
POTASSIUM SERPL-SCNC: 2.8 MMOL/L
POTASSIUM SERPL-SCNC: 2.8 MMOL/L
PROT SERPL-MCNC: 4.8 G/DL
PROT SERPL-MCNC: 4.9 G/DL
RBC # BLD AUTO: 2.43 M/UL
SODIUM SERPL-SCNC: 133 MMOL/L
SODIUM SERPL-SCNC: 133 MMOL/L
WBC # BLD AUTO: 2.65 K/UL
ZINC SERPL-MCNC: 35 UG/DL (ref 60–130)

## 2018-01-23 PROCEDURE — 25000003 PHARM REV CODE 250: Performed by: NURSE PRACTITIONER

## 2018-01-23 PROCEDURE — 25000003 PHARM REV CODE 250: Performed by: HOSPITALIST

## 2018-01-23 PROCEDURE — 99233 SBSQ HOSP IP/OBS HIGH 50: CPT | Mod: ,,, | Performed by: INTERNAL MEDICINE

## 2018-01-23 PROCEDURE — 11000001 HC ACUTE MED/SURG PRIVATE ROOM

## 2018-01-23 PROCEDURE — 80053 COMPREHEN METABOLIC PANEL: CPT

## 2018-01-23 PROCEDURE — 25000003 PHARM REV CODE 250: Performed by: INTERNAL MEDICINE

## 2018-01-23 PROCEDURE — 63600175 PHARM REV CODE 636 W HCPCS: Performed by: INTERNAL MEDICINE

## 2018-01-23 PROCEDURE — 85025 COMPLETE CBC W/AUTO DIFF WBC: CPT

## 2018-01-23 PROCEDURE — 36415 COLL VENOUS BLD VENIPUNCTURE: CPT

## 2018-01-23 PROCEDURE — 80299 QUANTITATIVE ASSAY DRUG: CPT

## 2018-01-23 RX ORDER — FAMOTIDINE 20 MG/1
20 TABLET, FILM COATED ORAL 2 TIMES DAILY
Status: DISCONTINUED | OUTPATIENT
Start: 2018-01-23 | End: 2018-01-29 | Stop reason: HOSPADM

## 2018-01-23 RX ORDER — DOXYLAMINE SUCCINATE 25 MG
TABLET ORAL
Status: DISCONTINUED | OUTPATIENT
Start: 2018-01-23 | End: 2018-01-23

## 2018-01-23 RX ADMIN — FAMOTIDINE 20 MG: 20 TABLET, FILM COATED ORAL at 08:01

## 2018-01-23 RX ADMIN — MICONAZOLE NITRATE: 20.6 POWDER TOPICAL at 08:01

## 2018-01-23 RX ADMIN — PIPERACILLIN AND TAZOBACTAM 4.5 G: 4; .5 INJECTION, POWDER, LYOPHILIZED, FOR SOLUTION INTRAVENOUS; PARENTERAL at 08:01

## 2018-01-23 RX ADMIN — RAMIPRIL 2.5 MG: 2.5 CAPSULE ORAL at 08:01

## 2018-01-23 RX ADMIN — PRAVASTATIN SODIUM 40 MG: 40 TABLET ORAL at 08:01

## 2018-01-23 RX ADMIN — NYSTATIN 500000 UNITS: 100000 SUSPENSION ORAL at 12:01

## 2018-01-23 RX ADMIN — NYSTATIN 500000 UNITS: 100000 SUSPENSION ORAL at 04:01

## 2018-01-23 RX ADMIN — NYSTATIN 500000 UNITS: 100000 SUSPENSION ORAL at 09:01

## 2018-01-23 RX ADMIN — ASPIRIN 325 MG: 325 TABLET, DELAYED RELEASE ORAL at 10:01

## 2018-01-23 RX ADMIN — PIPERACILLIN AND TAZOBACTAM 4.5 G: 4; .5 INJECTION, POWDER, LYOPHILIZED, FOR SOLUTION INTRAVENOUS; PARENTERAL at 04:01

## 2018-01-23 RX ADMIN — METOPROLOL SUCCINATE 25 MG: 25 TABLET, EXTENDED RELEASE ORAL at 09:01

## 2018-01-23 RX ADMIN — POTASSIUM BICARBONATE 50 MEQ: 25 TABLET, EFFERVESCENT ORAL at 10:01

## 2018-01-23 RX ADMIN — FAMOTIDINE 20 MG: 20 TABLET, FILM COATED ORAL at 09:01

## 2018-01-23 RX ADMIN — CLOPIDOGREL BISULFATE 75 MG: 75 TABLET ORAL at 08:01

## 2018-01-23 RX ADMIN — MICONAZOLE NITRATE: 20 OINTMENT TOPICAL at 09:01

## 2018-01-23 RX ADMIN — BENZOCAINE: 200 SPRAY DENTAL; ORAL; PERIODONTAL at 09:01

## 2018-01-23 RX ADMIN — NYSTATIN 500000 UNITS: 100000 SUSPENSION ORAL at 07:01

## 2018-01-23 RX ADMIN — METOPROLOL SUCCINATE 25 MG: 25 TABLET, EXTENDED RELEASE ORAL at 08:01

## 2018-01-23 NOTE — CARE UPDATE
Ms. Doe is a 78 yo woman with RA on Plaquenil and methotrexate who presented complaining of oropharyngeal and vaginal pain. She was found to be neutropenic and in DKA. Her DKA quickly resolved with fluids and insulin. She was thought to have methotrexate toxicity as she had mucositis and pancytopenia. MTX level was not elevated but ANC improved after discontinuing MTX and Plaquenil. Follow ID and hem/onc recs.  Of note, she lives alone and is checked on by neighbors and grandchildren. Her home was found to be in unsanitary condition. SW aware. She seems to have dementia vs delirium of unknown chronicity though her mental status has improved since presentation.

## 2018-01-23 NOTE — PROGRESS NOTES
[facility] Hospital Medicine  Progress Note    Patient Name: Ruthann Doe  MRN: [id number]  Patient Class: IP- Inpatient   Admission Date: 1/20/2018  Length of Stay: 3 days  Attending Physician: Michel Woody MD  Primary Care Provider: Jesse Partida MD        Subjective:     Principal Problem:Diabetic ketoacidosis without coma associated with type 2 diabetes mellitus    HPI:  Ms. Doe is a 78 yo woman with NIDDM (A1c 10.2%), CKD3, CAD, and RA who presented for painful mouth lesions. She also complains of vaginal irritation. She was found to be neutropenic and thrombocytopenic. She has been non-compliant with her home regimen of glimepiride. She was found to be in DKA and admitted to the ICU on an insulin infusion. Please see H&P for full detail.     Hospital Course:  She was given IV fluids and started on insulin drip and her DKA quickly resolved. She was started on basal/bolus insulin. Her hemoglobin A1c was 10.2%. She may need chronic insulin therapy. Basal insulin decreased for hypoglycemia.  Hematology and ID were consulted for pancytopenia. She was afebrile. She had mucositis. She was thought to have methotrexate toxicity and was started on leucovorin (folinic acid). MTX level not elevated. BCx NGTD. UCx from 1/20 with ESBL E coli but <100k cfu. Repeat UCx NGTD. Zosyn until ANC improves.pancytopenia is improving.    Interval History: much more alert.    Review of Systems   Constitutional: Negative for chills and fever.   HENT: Positive for mouth sores. Negative for congestion and rhinorrhea.    Eyes: Negative for photophobia and visual disturbance.   Respiratory: Negative for cough and shortness of breath.    Cardiovascular: Negative for chest pain and leg swelling.   Gastrointestinal: Negative for abdominal pain, nausea and vomiting.   Genitourinary: Positive for vaginal pain.   Musculoskeletal: Negative for joint swelling and neck stiffness.   Skin: Negative for rash  and wound.   Neurological: Negative for dizziness and light-headedness.   Psychiatric/Behavioral: Negative for agitation and behavioral problems.     Objective:     Vital Signs (Most Recent):  Temp: 97.5 °F (36.4 °C) (01/23/18 0745)  Pulse: 72 (01/23/18 0745)  Resp: 19 (01/23/18 0745)  BP: (!) 117/55 (01/23/18 0745)  SpO2: 100 % (01/23/18 0745) Vital Signs (24h Range):  Temp:  [97.5 °F (36.4 °C)-98.8 °F (37.1 °C)] 97.5 °F (36.4 °C)  Pulse:  [72-85] 72  Resp:  [16-59] 19  SpO2:  [92 %-100 %] 100 %  BP: ()/(45-67) 117/55     Weight: 54.7 kg (120 lb 9.5 oz)  Body mass index is 19.46 kg/m².    Intake/Output Summary (Last 24 hours) at 01/23/18 0941  Last data filed at 01/23/18 0300   Gross per 24 hour   Intake              780 ml   Output                0 ml   Net              780 ml      Physical Exam   Constitutional: She appears well-developed and well-nourished. No distress.   HENT:   Right Ear: External ear normal.   Left Ear: External ear normal.   Nose: Nose normal.   Eyes: EOM are normal. Pupils are equal, round, and reactive to light. No scleral icterus.   Neck: Normal range of motion. Neck supple. No thyromegaly present.   Cardiovascular: Normal rate and normal heart sounds.  Exam reveals no friction rub.    No murmur heard.  Pulmonary/Chest: Effort normal and breath sounds normal. No respiratory distress. She has no wheezes. She has no rales.   Abdominal: Soft. Bowel sounds are normal. She exhibits no mass. There is no tenderness.   Musculoskeletal: Normal range of motion. She exhibits no edema or deformity.   Neurological: She is alert. No cranial nerve deficit.   Skin: Skin is warm and dry. No pallor.       Significant Labs: All pertinent labs within the past 24 hours have been reviewed.    Significant Imaging: I have reviewed and interpreted all pertinent imaging results/findings within the past 24 hours.    Assessment/Plan:      * Diabetic ketoacidosis without coma associated with type 2 diabetes  mellitus    Resolved. Transitioned to SSI.. Adjusted for hypoglycemia.          Noncompliance with medication regimen    Likely contributory to DKA.         Acquired immunocompromised state    Immunocompromised state due to treatment with methotrexate and plaquenil for rheumatoid arthritis treatment.        Vaginal candidiasis    Reports improvement with miconazole and diflucan. Candidiasis vs mucositis or both? I don't see KOH test.        Oral thrush    Symptomatic improvement with nystatin/diflucan and hurricane spray.         Neutropenia    Drug induced. Neutropenic precautions.improving.        Pancytopenia    Appreciate ID and hem recs. Hold MTX and plaquenil. Improving.        AICD (automatic cardioverter/defibrillator) present    No events. Stable.        Essential hypertension    Home metoprolol and ramipril held for hypotension.        Rheumatoid arthritis    Holding home meds        CAD (coronary artery disease)    No acute issue. Cont home meds.          VTE Risk Mitigation         Ordered     Medium Risk of VTE  Once      01/20/18 2347     Place CHANO hose  Until discontinued      01/20/18 2347     Place sequential compression device  Until discontinued      01/20/18 2347              Michel Woody MD  Department of Hospital Medicine   [facility]

## 2018-01-23 NOTE — PLAN OF CARE
Problem: Neutropenia (Adult)  Intervention: Prevent Infection/Maximize Resistance  Continue with neutropenic precautions. Remains free of fall or of injury. Treating lesions as order to inside mouth and lips. Antibiotics given as order. Denies any pain. Vital signs are stable. Call light within reach. Instructed to call as needed.

## 2018-01-23 NOTE — ASSESSMENT & PLAN NOTE
Reports improvement with miconazole and diflucan. Candidiasis vs mucositis or both? I don't see KOH test.

## 2018-01-23 NOTE — PLAN OF CARE
01/22/18 1500   Discharge Assessment   Assessment Type Discharge Planning Assessment   Confirmed/corrected address and phone number on facesheet? Yes   Assessment information obtained from? Patient   Communicated expected length of stay with patient/caregiver no   Prior to hospitilization cognitive status: Alert/Oriented   Prior to hospitalization functional status: Independent   Current cognitive status: Alert/Oriented   Current Functional Status: Needs Assistance   Facility Arrived From: home   Lives With alone   Able to Return to Prior Arrangements unable to determine at this time (comments)   Is patient able to care for self after discharge? Unable to determine at this time (comments)   Who are your caregiver(s) and their phone number(s)? none--patient believes that her granddaughter (demographics will help)   Patient's perception of discharge disposition home or selfcare   Readmission Within The Last 30 Days no previous admission in last 30 days   Patient currently being followed by outpatient case management? Unable to determine (comments)   Patient currently receives any other outside agency services? No   Equipment Currently Used at Home none   Do you have any problems affording any of your prescribed medications? TBD   Is the patient taking medications as prescribed? yes   Does the patient have transportation home? Yes   Transportation Available family or friend will provide   Discharge Plan A Home   Discharge Plan B Home Health;Home   Patient/Family In Agreement With Plan yes   CICI met with patient at time of her transfer from ICU. She was oriented, having some difficulty with memory issues at time. CICI reviewed the blue discharge charge planning folder, how to use it for helping to manage health care at home. Patient agreed that CICI can call her grand daughter to confirm she can help at home if needed. CICI attempted call ([phone])--phone rings, no answer--goes to busy. CICI attempted call to daughter  Ayse [phone] listed in sticky note, no answer, no voice mail. Will attempt at later time.  NOTE--SW received call on 1/21 from patient RN in ICU who stated she spoke with patient sister in law. Sister in law expressed concern that patient has not been able to care for her dog at home. S-I-l told RN that patient friend caring for pets due to patient admission removed 3 bags of dog feces from home.   SW inquired of patient if she has pets at home, states has dog (Faroese Abbott) and cat. SW unable to further assess due to patient still weak at time transferring to new room.   Will need to explore patient needs further. Will alter SW/CM assuming care on 4th floor with patient.   SW/CM will follow and assist as needed.

## 2018-01-23 NOTE — SUBJECTIVE & OBJECTIVE
Interval History: much more alert.    Review of Systems   Constitutional: Negative for chills and fever.   HENT: Positive for mouth sores. Negative for congestion and rhinorrhea.    Eyes: Negative for photophobia and visual disturbance.   Respiratory: Negative for cough and shortness of breath.    Cardiovascular: Negative for chest pain and leg swelling.   Gastrointestinal: Negative for abdominal pain, nausea and vomiting.   Genitourinary: Positive for vaginal pain.   Musculoskeletal: Negative for joint swelling and neck stiffness.   Skin: Negative for rash and wound.   Neurological: Negative for dizziness and light-headedness.   Psychiatric/Behavioral: Negative for agitation and behavioral problems.     Objective:     Vital Signs (Most Recent):  Temp: 97.5 °F (36.4 °C) (01/23/18 0745)  Pulse: 72 (01/23/18 0745)  Resp: 19 (01/23/18 0745)  BP: (!) 117/55 (01/23/18 0745)  SpO2: 100 % (01/23/18 0745) Vital Signs (24h Range):  Temp:  [97.5 °F (36.4 °C)-98.8 °F (37.1 °C)] 97.5 °F (36.4 °C)  Pulse:  [72-85] 72  Resp:  [16-59] 19  SpO2:  [92 %-100 %] 100 %  BP: ()/(45-67) 117/55     Weight: 54.7 kg (120 lb 9.5 oz)  Body mass index is 19.46 kg/m².    Intake/Output Summary (Last 24 hours) at 01/23/18 0941  Last data filed at 01/23/18 0300   Gross per 24 hour   Intake              780 ml   Output                0 ml   Net              780 ml      Physical Exam   Constitutional: She appears well-developed and well-nourished. No distress.   HENT:   Right Ear: External ear normal.   Left Ear: External ear normal.   Nose: Nose normal.   Eyes: EOM are normal. Pupils are equal, round, and reactive to light. No scleral icterus.   Neck: Normal range of motion. Neck supple. No thyromegaly present.   Cardiovascular: Normal rate and normal heart sounds.  Exam reveals no friction rub.    No murmur heard.  Pulmonary/Chest: Effort normal and breath sounds normal. No respiratory distress. She has no wheezes. She has no rales.    Abdominal: Soft. Bowel sounds are normal. She exhibits no mass. There is no tenderness.   Musculoskeletal: Normal range of motion. She exhibits no edema or deformity.   Neurological: She is alert. No cranial nerve deficit.   Skin: Skin is warm and dry. No pallor.       Significant Labs: All pertinent labs within the past 24 hours have been reviewed.    Significant Imaging: I have reviewed and interpreted all pertinent imaging results/findings within the past 24 hours.

## 2018-01-23 NOTE — PROGRESS NOTES
Pharmacist Intervention IV to PO Note    Ruthann Doe is a 77 y.o. female being treated with IV medication pepcid 20 mg twice daily.    Patient Data:    Vital Signs (Most Recent):  Temp: 97.5 °F (36.4 °C) (01/23/18 0745)  Pulse: 72 (01/23/18 0745)  Resp: 19 (01/23/18 0745)  BP: (!) 117/55 (01/23/18 0745)  SpO2: 100 % (01/23/18 0745)   Vital Signs (72h Range):  Temp:  [97.4 °F (36.3 °C)-101.8 °F (38.8 °C)]   Pulse:  []   Resp:  [16-71]   BP: ()/(44-94)   SpO2:  [72 %-100 %]      CBC:    Recent Labs     Lab 01/21/18  0516 01/22/18  0432 01/23/18  0530   WBC 0.55* 1.23* 2.65*   RBC 3.23* 2.76* 2.43*   HGB 10.2* 8.6* 7.5*   HCT 28.7* 24.1* 20.9*   PLT 32* 27* 47*   MCV 89 87 86   MCH 31.6* 31.2* 30.9   MCHC 35.5 35.7 35.9     CMP:     Recent Labs     Lab 01/21/18  0516  01/21/18  1505 01/22/18  0432 01/23/18  0530   * < > 265* 70  70 67*  67*   CALCIUM 8.5* < > 8.1* 8.8  8.8 8.3*  8.3*   ALBUMIN 2.5* --  --  2.2* 1.9*   PROT 5.4* --  --  5.3* 4.9*   * < > 127* 130*  130* 133*  133*   K 3.8 < > 3.0* 3.8  3.8 2.8*  2.8*   CO2 14* < > 15* 16*  16* 20*  20*    < > 103 106  106 105  105   BUN 24* < > 17 12  12 8  8   CREATININE 1.0 < > 0.9 0.8  0.8 0.7  0.7   ALKPHOS 50* --  --  51* 56   ALT 22 --  --  39 90*   AST 19 --  --  60* 150*   BILITOT 0.6 --  --  0.5 0.5   < > = values in this interval not displayed.       Dietary Orders:  Diet Orders            Diet Diabetic 2000 Calories Neutropenic, Pureed: Diabetic 2000 starting at 01/21 1022            Based on the following criteria, this patient qualifies for intravenous to oral conversion:  [x] The patients gastrointestinal tract is functioning (tolerating medications via oral or enteral route for 24 hours and tolerating food or enteral feeds for 24 hours).  [x] The patient is hemodynamically stable for 24 hours (heart rate <100 beats per minute, systolic blood pressure >99 mm Hg, and respiratory rate <20 breaths per  minute).  [x] The patient shows clinical improvement (afebrile for at least 24 hours and white blood cell count downtrending or normalized). Additionally, the patient must be non-neutropenic (absolute neutrophil count >500 cells/mm3).  [x] For antimicrobials, the patient has received IV therapy for at least 24 hours.    IV medication will be changed to oral medication pepcid 20 mg twice daily.    Pharmacist's Name: Lewis Toussaint Jr

## 2018-01-23 NOTE — SUBJECTIVE & OBJECTIVE
Interval History: Pt more alert.Remains confused. Appetite poor.     Oncology Treatment Plan:   [No treatment plan]    Medications:  Continuous Infusions:  Scheduled Meds:   aspirin  325 mg Oral Daily    clopidogrel  75 mg Oral Daily    famotidine  20 mg Oral BID    leucovorin (WELLCOVORIN) infusion  10 mg Intravenous Q24H    metoprolol succinate  25 mg Oral BID    miconazole nitrate 2%   Topical (Top) BID    nystatin  500,000 Units Oral QID (WM & HS)    piperacillin-tazobactam 4.5 g in dextrose 5 % 100 mL IVPB (ready to mix system)  4.5 g Intravenous Q8H    pravastatin  40 mg Oral Daily    ramipril  2.5 mg Oral Daily     PRN Meds:acetaminophen, acetaminophen, benzocaine, dextrose 10 % in water (D10W), dextrose 50%, dextrose 50%, glucagon (human recombinant), glucose, glucose, influenza, insulin aspart, ondansetron, pneumoc 13-brad conj-dip cr(PF), sodium chloride 0.9%     Review of Systems   Constitutional: Positive for appetite change and fatigue. Negative for fever and unexpected weight change.   HENT: Positive for mouth sores.    Eyes: Negative for visual disturbance.   Respiratory: Negative for cough and shortness of breath.    Cardiovascular: Negative for chest pain.   Gastrointestinal: Negative for abdominal pain and diarrhea.   Genitourinary: Negative for frequency.   Musculoskeletal: Negative for back pain.   Skin: Negative for rash.   Neurological: Positive for weakness. Negative for headaches.   Hematological: Negative for adenopathy.   Psychiatric/Behavioral: The patient is not nervous/anxious.      Objective:     Vital Signs (Most Recent):  Temp: 98.1 °F (36.7 °C) (01/23/18 1227)  Pulse: 75 (01/23/18 1227)  Resp: 19 (01/23/18 1227)  BP: (!) 123/56 (01/23/18 1227)  SpO2: 100 % (01/23/18 1227) Vital Signs (24h Range):  Temp:  [97.5 °F (36.4 °C)-98.8 °F (37.1 °C)] 98.1 °F (36.7 °C)  Pulse:  [72-85] 75  Resp:  [16-19] 19  SpO2:  [92 %-100 %] 100 %  BP: ()/(45-67) 123/56     Weight: 54.7 kg  (120 lb 9.5 oz)  Body mass index is 19.46 kg/m².  Body surface area is 1.6 meters squared.      Intake/Output Summary (Last 24 hours) at 01/23/18 1713  Last data filed at 01/23/18 0300   Gross per 24 hour   Intake              440 ml   Output                0 ml   Net              440 ml       Physical Exam   Constitutional: She appears well-developed and well-nourished.   chronically-ill appearing    HENT:   Head: Normocephalic.   Mouth/Throat: No oropharyngeal exudate.   Eyes: Conjunctivae and EOM are normal.   Neck: Normal range of motion. Neck supple.   Cardiovascular: Normal rate, regular rhythm and normal heart sounds.    No murmur heard.  Pulmonary/Chest: Effort normal and breath sounds normal. She has no wheezes. She has no rales.   Abdominal: Soft. Bowel sounds are normal. There is no hepatosplenomegaly. There is no tenderness.   Musculoskeletal: Normal range of motion. She exhibits no edema or tenderness.   Neurological: She is alert.   disoriented   Skin: Skin is warm and dry. No ecchymosis, no petechiae and no rash noted. No erythema.       Significant Labs:   All pertinent labs within the past 24 hours have been reviewed    Diagnostic Results:  I have reviewed and interpreted all pertinent imaging results/findings within the past 24 hours

## 2018-01-23 NOTE — ASSESSMENT & PLAN NOTE
Likely drug-induced, MTX induced  HOLD MTX and Plaquenil   Transfuse Hb if <7g/dl  Transfuse plts if <20k w/bleeding or <10k    PBS-no blasts , no morphologic abnormalities of myeloid or lymphoid   Coag parameters do not reveal evid of DIC  No evid of B12 deficiency  HIV neg    Counts improving    Cont to monitor    CBC pending

## 2018-01-23 NOTE — PROGRESS NOTES
[facility]  Hematology/Oncology  Progress Note    Patient Name: Ruthann Doe  Admission Date: 1/20/2018  Hospital Length of Stay: 3 days  Code Status: Full Code     Subjective:     HPI:  Pt is a 77 y/o female with pmhx of severe RA , CAD, DMType 2, presents to ED with c/o painful mouth and vaginal  ulcers and generalized weakness past week. Pt is a poor historian. She is followed by Rheumatology at an outside facility. She is taking MTX and plaquenil. She reports she ran out of daily folate supplementation. She reports episodes of hematuria. No dysuria. Pt with febrile episode with temp 101. She is being treated with diflucan.No SOB/CP.CBC reveals wbc .55  Hb 10. 2g/dl Hct 28.7% plt ct 32k. . No N/V. Family members ( form out of town)  at bedside. Pt lives alone.     Interval History: Pt more alert.Remains confused. Appetite poor.     Oncology Treatment Plan:   [No treatment plan]    Medications:  Continuous Infusions:  Scheduled Meds:   aspirin  325 mg Oral Daily    clopidogrel  75 mg Oral Daily    famotidine  20 mg Oral BID    leucovorin (WELLCOVORIN) infusion  10 mg Intravenous Q24H    metoprolol succinate  25 mg Oral BID    miconazole nitrate 2%   Topical (Top) BID    nystatin  500,000 Units Oral QID (WM & HS)    piperacillin-tazobactam 4.5 g in dextrose 5 % 100 mL IVPB (ready to mix system)  4.5 g Intravenous Q8H    pravastatin  40 mg Oral Daily    ramipril  2.5 mg Oral Daily     PRN Meds:acetaminophen, acetaminophen, benzocaine, dextrose 10 % in water (D10W), dextrose 50%, dextrose 50%, glucagon (human recombinant), glucose, glucose, influenza, insulin aspart, ondansetron, pneumoc 13-brad conj-dip cr(PF), sodium chloride 0.9%     Review of Systems   Constitutional: Positive for appetite change and fatigue. Negative for fever and unexpected weight change.   HENT: Positive for mouth sores.    Eyes: Negative for visual disturbance.   Respiratory: Negative for cough and  shortness of breath.    Cardiovascular: Negative for chest pain.   Gastrointestinal: Negative for abdominal pain and diarrhea.   Genitourinary: Negative for frequency.   Musculoskeletal: Negative for back pain.   Skin: Negative for rash.   Neurological: Positive for weakness. Negative for headaches.   Hematological: Negative for adenopathy.   Psychiatric/Behavioral: The patient is not nervous/anxious.      Objective:     Vital Signs (Most Recent):  Temp: 98.1 °F (36.7 °C) (01/23/18 1227)  Pulse: 75 (01/23/18 1227)  Resp: 19 (01/23/18 1227)  BP: (!) 123/56 (01/23/18 1227)  SpO2: 100 % (01/23/18 1227) Vital Signs (24h Range):  Temp:  [97.5 °F (36.4 °C)-98.8 °F (37.1 °C)] 98.1 °F (36.7 °C)  Pulse:  [72-85] 75  Resp:  [16-19] 19  SpO2:  [92 %-100 %] 100 %  BP: ()/(45-67) 123/56     Weight: 54.7 kg (120 lb 9.5 oz)  Body mass index is 19.46 kg/m².  Body surface area is 1.6 meters squared.      Intake/Output Summary (Last 24 hours) at 01/23/18 1713  Last data filed at 01/23/18 0300   Gross per 24 hour   Intake              440 ml   Output                0 ml   Net              440 ml       Physical Exam   Constitutional: She appears well-developed and well-nourished.   chronically-ill appearing    HENT:   Head: Normocephalic.   Mouth/Throat: No oropharyngeal exudate.   Eyes: Conjunctivae and EOM are normal.   Neck: Normal range of motion. Neck supple.   Cardiovascular: Normal rate, regular rhythm and normal heart sounds.    No murmur heard.  Pulmonary/Chest: Effort normal and breath sounds normal. She has no wheezes. She has no rales.   Abdominal: Soft. Bowel sounds are normal. There is no hepatosplenomegaly. There is no tenderness.   Musculoskeletal: Normal range of motion. She exhibits no edema or tenderness.   Neurological: She is alert.   disoriented   Skin: Skin is warm and dry. No ecchymosis, no petechiae and no rash noted. No erythema.       Significant Labs:   All pertinent labs within the past 24 hours have  been reviewed    Diagnostic Results:  I have reviewed and interpreted all pertinent imaging results/findings within the past 24 hours    Assessment/Plan:     Oral thrush    Tx per ID         Neutropenia    Drug-toxicity related  ANC improved to 600        Pancytopenia    Likely drug-induced, MTX induced  HOLD MTX and Plaquenil   Transfuse Hb if <7g/dl  Transfuse plts if <20k w/bleeding or <10k    PBS-no blasts , no morphologic abnormalities of myeloid or lymphoid   Coag parameters do not reveal evid of DIC  No evid of B12 deficiency  HIV neg    Counts improving    Cont to monitor            Rheumatoid arthritis    MTX and Plaquenil on hold  S/p Leucovorin therapy   MTX level <0.4   Hold LCV              Ashley Covington MD  Hematology/Oncology  [facility] [place]

## 2018-01-23 NOTE — SUBJECTIVE & OBJECTIVE
Interval History: much more alert.    Review of Systems   Constitutional: Negative for chills and fever.   HENT: Positive for mouth sores. Negative for congestion and rhinorrhea.    Eyes: Negative for photophobia and visual disturbance.   Respiratory: Negative for cough and shortness of breath.    Cardiovascular: Negative for chest pain and leg swelling.   Gastrointestinal: Negative for abdominal pain, nausea and vomiting.   Genitourinary: Positive for vaginal pain.   Musculoskeletal: Negative for joint swelling and neck stiffness.   Skin: Negative for rash and wound.   Neurological: Negative for dizziness and light-headedness.   Psychiatric/Behavioral: Negative for agitation and behavioral problems.     Objective:     Vital Signs (Most Recent):  Temp: 98.1 °F (36.7 °C) (01/22/18 1725)  Pulse: 78 (01/22/18 1725)  Resp: 16 (01/22/18 1725)  BP: (!) 100/45 (01/22/18 1725)  SpO2: 99 % (01/22/18 1725) Vital Signs (24h Range):  Temp:  [97.6 °F (36.4 °C)-98.4 °F (36.9 °C)] 98.1 °F (36.7 °C)  Pulse:  [78-97] 78  Resp:  [16-59] 16  SpO2:  [72 %-100 %] 99 %  BP: ()/(44-60) 100/45     Weight: 54.7 kg (120 lb 9.5 oz)  Body mass index is 19.46 kg/m².    Intake/Output Summary (Last 24 hours) at 01/22/18 1953  Last data filed at 01/22/18 1233   Gross per 24 hour   Intake             1330 ml   Output             1200 ml   Net              130 ml      Physical Exam   Constitutional: She appears well-developed and well-nourished. No distress.   HENT:   Right Ear: External ear normal.   Left Ear: External ear normal.   Nose: Nose normal.   Eyes: EOM are normal. Pupils are equal, round, and reactive to light. No scleral icterus.   Neck: Normal range of motion. Neck supple. No thyromegaly present.   Cardiovascular: Normal rate and normal heart sounds.  Exam reveals no friction rub.    No murmur heard.  Pulmonary/Chest: Effort normal and breath sounds normal. No respiratory distress. She has no wheezes. She has no rales.    Abdominal: Soft. Bowel sounds are normal. She exhibits no mass. There is no tenderness.   Musculoskeletal: Normal range of motion. She exhibits no edema or deformity.   Neurological: She is alert. No cranial nerve deficit.   Skin: Skin is warm and dry. No pallor.       Significant Labs: All pertinent labs within the past 24 hours have been reviewed.    Significant Imaging: I have reviewed and interpreted all pertinent imaging results/findings within the past 24 hours.

## 2018-01-23 NOTE — PLAN OF CARE
Problem: Patient Care Overview  Goal: Plan of Care Review  Outcome: Ongoing (interventions implemented as appropriate)  Patient remains free from injury and falls. Alert and oriented at times. Disoriented to place. Reminded frequently throughout shift that she's in the hospital. Patient calm and cooperative. Mouth with sores. Groin excoriated and edematous. Screams in pain with hygienic care.Cream applied as needed. Calls for call light. Remains afebrile. Plan of care continued.

## 2018-01-23 NOTE — PROGRESS NOTES
Report oncoming nurse.Condition unchanged.Lab called on arrival to floor with critical lab bhargav of wbc 1.23 & pl.ct.27.  on floor & aware.spoke with  & aware of pt.lab fabricio. Continue with blood sugar monitoring. Telemetry monitoring on arrival to floor & d/c as per new orders. Call light in reach. Bed alarm remains activated for pt.safety.

## 2018-01-23 NOTE — CONSULTS
A 77 year old female admitted 1/20/18 from home with diabetic ketoacidosis associated with DM II; pancytopenia; neutropenia; oral thrush; vaginal candidiasis; acquired immunocompromised state; noncompliance with medication regimen; AICD; CAD; HTN; rheumatoid arthritis  PMH: Rheumatoid arthritis; cardiac arrhythmia; CAD; DM II; pneumonia; renal insufficiency; S/P CABG; S/P AICD; osteomyelitis  1/23 WBC 2.65 Hgb 7.5 Hct 20.9 Alb 1.9   1/21 A1C 10.2  Consulted for candidiasis of groin- treatment with miconazole 2% powder, miconazole ointment prn, and nystatin suspension  Assessment:  Light erythema in groin with darker red area involving labia. Noted painful 5 mm ulceration inside left labia. Patient reports perineal area still painful, but groin improved.  Treatment:  Local wound care to perineum and groin with Critic Aid Antifungal bid after cleansing with bath wipes. May discontinue miconazole powder. Treatment plan discussed with patient.

## 2018-01-23 NOTE — PROGRESS NOTES
Received from ICU via bed. Continues with neutropenic & contact precautions. Pt.awake ,alert,& oriented to name &  only. Confused to time,place,&situation. Iv antibiotic remains in progress. Call light in reach. Bed alarm remains activated for pt.safety.

## 2018-01-23 NOTE — CONSULTS
Consult Note  Infectious Disease    Consult Requested By: Michel Woody MD    Reason for Consult: pancytopenia and fever    SUBJECTIVE:     History of Present Illness:  Patient is a 77 y.o. female presents with mouth ulcers. She has severe RA and has been on methotrexate and plaquenil for 3 years. She does not recall when her last visit to rheumatology occurred. She is now here with painful mouth ulcers and vaginal ulcers since 1-2 weeks.  Her wbc is better. She has no fever.    Past Medical History:   Diagnosis Date    Arthritis     Rheumatoid  arthritis    Cardiac arrhythmia     Coronary artery disease     Diabetes mellitus type II     Pneumonia     as a kid, with empyema    Renal insufficiency     Rheumatoid arthritis(714.0)      Past Surgical History:   Procedure Laterality Date    CARDIAC DEFIBRILLATOR PLACEMENT      CORONARY ARTERY BYPASS GRAFT       4 para 3       HYSTERECTOMY      osteomyelitis      TONSILLECTOMY      TOTAL KNEE ARTHROPLASTY      TOTAL SHOULDER ARTHROPLASTY       Family History   Problem Relation Age of Onset    Heart disease Mother     Diabetes Mother     Cancer Maternal Uncle      Social History   Substance Use Topics    Smoking status: Never Smoker    Smokeless tobacco: Never Used      Comment: Lives with son, extended family.  Three children.    Alcohol use Yes      Comment: only during holidays       Review of patient's allergies indicates:   Allergen Reactions    Sulfa (sulfonamide antibiotics) Hives    Percocet [oxycodone-acetaminophen] Nausea And Vomiting        Antibiotics     None          Review of Systems:  Constitutional: no fever or chills  Eyes: no visual changes  ENT: no nasal congestion or sore throat  Respiratory: no cough or shortness of breath  Cardiovascular: no chest pain or palpitations  Gastrointestinal: no nausea or vomiting, no abdominal pain or change in bowel habits  Genitourinary: no hematuria or dysuria  Integument/Breast:  no rash or pruritis  Musculoskeletal: no arthralgias or myalgias  Neurological: no seizures or tremors    OBJECTIVE:     Vital Signs (Most Recent)  Temp: 98.1 °F (36.7 °C) (01/23/18 1227)  Pulse: 75 (01/23/18 1227)  Resp: 19 (01/23/18 1227)  BP: (!) 123/56 (01/23/18 1227)  SpO2: 100 % (01/23/18 1227)    Temperature Range Min/Max (Last 24H):  Temp:  [97.5 °F (36.4 °C)-98.8 °F (37.1 °C)]     Physical Exam:  General: appears stated age  Eyes: conjunctivae/corneas clear. PERRL..  HENT: Head:normocephalic, atraumatic. Ears:bilateral TM's and external ear canals normal. Nose: Nares normal. Septum midline. Mucosa normal. No drainage or sinus tenderness., no discharge. Throat: several small ulcers of lips and oral mucosa with erythema.  Neck: supple, symmetrical, trachea midline, no JVD and thyroid not enlarged, symmetric, no tenderness/mass/nodules  Lungs:  clear to auscultation bilaterally and normal respiratory effort  Cardiovascular: Heart: regular rate and rhythm, S1, S2 normal, no murmur, click, rub or gallop. Chest Wall: no tenderness. Extremities: no cyanosis or edema, or clubbing. Pulses: 2+ and symmetric.  Abdomen/Rectal: Abdomen: soft, non-tender non-distented; bowel sounds normal; no masses,  no organomegaly. Rectal: not examined  Skin: vaginal introitus with hyperemia  Musculoskeletal:no clubbing, cyanosis    Laboratory:  CBC    Recent Labs  Lab 01/23/18  0530   WBC 2.65*   RBC 2.43*   HGB 7.5*   HCT 20.9*   PLT 47*     BMP    Recent Labs  Lab 01/23/18  0530   CO2 20*  20*   BUN 8  8   CREATININE 0.7  0.7   CALCIUM 8.3*  8.3*       Recent Labs  Lab 01/21/18  1450   COLORU Yellow   SPECGRAV 1.020   PHUR 5.0   PROTEINUA 1+*   BACTERIA Rare   NITRITE Negative   LEUKOCYTESUR Negative   UROBILINOGEN Negative   HYALINECASTS 0     Microbiology Results (last 7 days)     Procedure Component Value Units Date/Time    Urine culture [[id number]] Collected:  01/21/18 1450    Order Status:  Completed Specimen:  Urine from  Urine, Catheterized Updated:  01/23/18 0808     Urine Culture, Routine No growth    Blood culture [[id number]] Collected:  01/21/18 1505    Order Status:  Completed Specimen:  Blood Updated:  01/22/18 2103     Blood Culture, Routine No Growth to date     Blood Culture, Routine No Growth to date    Blood culture [[id number]] Collected:  01/21/18 1500    Order Status:  Completed Specimen:  Blood Updated:  01/22/18 2103     Blood Culture, Routine No Growth to date     Blood Culture, Routine No Growth to date    Urine culture **CANNOT BE ORDERED STAT** [[id number]]  (Susceptibility) Collected:  01/20/18 1751    Order Status:  Completed Specimen:  Urine from Urine Updated:  01/22/18 0913     Urine Culture, Routine Results called to and read back by:Tara Weber-ICU 09:13  01/22/2018     Urine Culture, Routine --     ESCHERICHIA COLI ESBL  10,000 - 49,999 cfu/ml            Diagnostic Results:  Labs: Reviewed  X-Ray: Reviewed    ASSESSMENT/PLAN:     Active Hospital Problems    Diagnosis  POA    *Diabetic ketoacidosis without coma associated with type 2 diabetes mellitus [E11.10]  Yes    Pancytopenia [D61.818]  Yes    Neutropenia [D70.9]  Yes    Oral thrush [B37.0]  Yes    Vaginal candidiasis [B37.3]  Yes    Acquired immunocompromised state [D84.9]  Yes    Noncompliance with medication regimen [Z91.14]  Not Applicable    AICD (automatic cardioverter/defibrillator) present [Z95.810]  Yes    CAD (coronary artery disease) [I25.10]  Yes    Essential hypertension [I10]  Yes    Rheumatoid arthritis [M06.9]  Yes      Resolved Hospital Problems    Diagnosis Date Resolved POA   No resolved problems to display.       1. Pancytopenia and mucositis in a patient with ra on methotrexate  This is methotrexate toxicity till proven otherwise  Dc mtx  Supplement folinic acid  Check mtx level  2. Fever sec to neutropenia, single fever spike  Urine culture with [id number] esbl ecoli on 1/20/18 and neg 1/21/18- leslie to zosyn is 1:16  Wbc  slowly climbing with folinic acid  Suspect contaminated urine culture   dc abx today   I do NOT think she has yeast

## 2018-01-23 NOTE — PROGRESS NOTES
[facility] Hospital Medicine  Progress Note    Patient Name: Ruthann Doe  MRN: [id number]  Patient Class: IP- Inpatient   Admission Date: 1/20/2018  Length of Stay: 2 days  Attending Physician: Radha Cabral MD  Primary Care Provider: Jesse Partida MD        Subjective:     Principal Problem:Diabetic ketoacidosis without coma associated with type 2 diabetes mellitus    HPI:  Ms. Doe is a 76 yo woman with NIDDM (A1c 10.2%), CKD3, CAD, and RA who presented for painful mouth lesions. She also complains of vaginal irritation. She was found to be neutropenic and thrombocytopenic. She has been non-compliant with her home regimen of glimepiride. She was found to be in DKA and admitted to the ICU on an insulin infusion. Please see H&P for full detail.     Hospital Course:  She was given IV fluids and started on insulin drip and her DKA quickly resolved. She was started on basal/bolus insulin. Her hemoglobin A1c was 10.2%. She may need chronic insulin therapy. Basal insulin decreased for hypoglycemia.  Hematology and ID were consulted for pancytopenia. She was afebrile. She had mucositis. She was thought to have methotrexate toxicity and was started on leucovorin (folinic acid). MTX level not elevated. BCx NGTD. UCx from 1/20 with ESBL E coli but <100k cfu. Repeat UCx NGTD. Zosyn until ANC improves.     Interval History: much more alert.    Review of Systems   Constitutional: Negative for chills and fever.   HENT: Positive for mouth sores. Negative for congestion and rhinorrhea.    Eyes: Negative for photophobia and visual disturbance.   Respiratory: Negative for cough and shortness of breath.    Cardiovascular: Negative for chest pain and leg swelling.   Gastrointestinal: Negative for abdominal pain, nausea and vomiting.   Genitourinary: Positive for vaginal pain.   Musculoskeletal: Negative for joint swelling and neck stiffness.   Skin: Negative for rash and wound.   Neurological:  Negative for dizziness and light-headedness.   Psychiatric/Behavioral: Negative for agitation and behavioral problems.     Objective:     Vital Signs (Most Recent):  Temp: 98.1 °F (36.7 °C) (01/22/18 1725)  Pulse: 78 (01/22/18 1725)  Resp: 16 (01/22/18 1725)  BP: (!) 100/45 (01/22/18 1725)  SpO2: 99 % (01/22/18 1725) Vital Signs (24h Range):  Temp:  [97.6 °F (36.4 °C)-98.4 °F (36.9 °C)] 98.1 °F (36.7 °C)  Pulse:  [78-97] 78  Resp:  [16-59] 16  SpO2:  [72 %-100 %] 99 %  BP: ()/(44-60) 100/45     Weight: 54.7 kg (120 lb 9.5 oz)  Body mass index is 19.46 kg/m².    Intake/Output Summary (Last 24 hours) at 01/22/18 1953  Last data filed at 01/22/18 1233   Gross per 24 hour   Intake             1330 ml   Output             1200 ml   Net              130 ml      Physical Exam   Constitutional: She appears well-developed and well-nourished. No distress.   HENT:   Right Ear: External ear normal.   Left Ear: External ear normal.   Nose: Nose normal.   Eyes: EOM are normal. Pupils are equal, round, and reactive to light. No scleral icterus.   Neck: Normal range of motion. Neck supple. No thyromegaly present.   Cardiovascular: Normal rate and normal heart sounds.  Exam reveals no friction rub.    No murmur heard.  Pulmonary/Chest: Effort normal and breath sounds normal. No respiratory distress. She has no wheezes. She has no rales.   Abdominal: Soft. Bowel sounds are normal. She exhibits no mass. There is no tenderness.   Musculoskeletal: Normal range of motion. She exhibits no edema or deformity.   Neurological: She is alert. No cranial nerve deficit.   Skin: Skin is warm and dry. No pallor.       Significant Labs: All pertinent labs within the past 24 hours have been reviewed.    Significant Imaging: I have reviewed and interpreted all pertinent imaging results/findings within the past 24 hours.    Assessment/Plan:      * Diabetic ketoacidosis without coma associated with type 2 diabetes mellitus    Resolved.  Transitioned to basal/bolus. Adjusted for hypoglycemia.          Noncompliance with medication regimen    Likely contributory to DKA.         Acquired immunocompromised state    Immunocompromised state due to treatment with methotrexate and plaquenil for rheumatoid arthritis treatment.        Vaginal candidiasis    Reports improvement with miconazole and diflucan. Candidiasis vs mucositis or both? I don't see KOH test.        Oral thrush    Symptomatic improvement with nystatin/diflucan and hurricane spray.         Neutropenia    Drug induced. Neutropenic precautions.        Pancytopenia    Appreciate ID and hem recs. Hold MTX and plaquenil. Improving.        AICD (automatic cardioverter/defibrillator) present    No events. Stable.        Hypertension    Home metoprolol and ramipril held for hypotension.        Rheumatoid arthritis    Holding home meds        CAD (coronary artery disease)    No acute issue. Cont home meds.          VTE Risk Mitigation         Ordered     Medium Risk of VTE  Once      01/20/18 2347     Place CHANO hose  Until discontinued      01/20/18 2347     Place sequential compression device  Until discontinued      01/20/18 2347              Radha Cabral MD  Department of Hospital Medicine   [facility]

## 2018-01-24 LAB
ALBUMIN SERPL BCP-MCNC: 1.8 G/DL
ALP SERPL-CCNC: 64 U/L
ALT SERPL W/O P-5'-P-CCNC: 100 U/L
ANION GAP SERPL CALC-SCNC: 7 MMOL/L
ANISOCYTOSIS BLD QL SMEAR: ABNORMAL
AST SERPL-CCNC: 111 U/L
BASOPHILS # BLD AUTO: 0.01 K/UL
BASOPHILS NFR BLD: 0.2 %
BILIRUB SERPL-MCNC: 0.4 MG/DL
BUN SERPL-MCNC: 6 MG/DL
CALCIUM SERPL-MCNC: 8.2 MG/DL
CHLORIDE SERPL-SCNC: 106 MMOL/L
CO2 SERPL-SCNC: 22 MMOL/L
CREAT SERPL-MCNC: 0.7 MG/DL
DIFFERENTIAL METHOD: ABNORMAL
EOSINOPHIL # BLD AUTO: 0.3 K/UL
EOSINOPHIL NFR BLD: 7.2 %
ERYTHROCYTE [DISTWIDTH] IN BLOOD BY AUTOMATED COUNT: 15.3 %
EST. GFR  (AFRICAN AMERICAN): >60 ML/MIN/1.73 M^2
EST. GFR  (NON AFRICAN AMERICAN): >60 ML/MIN/1.73 M^2
GLUCOSE SERPL-MCNC: 115 MG/DL
HCT VFR BLD AUTO: 21.7 %
HGB BLD-MCNC: 7.5 G/DL
HYPOCHROMIA BLD QL SMEAR: ABNORMAL
INTERPRETATION SERPL IFE-IMP: NORMAL
LYMPHOCYTES # BLD AUTO: 1.1 K/UL
LYMPHOCYTES NFR BLD: 26.2 %
MCH RBC QN AUTO: 30 PG
MCHC RBC AUTO-ENTMCNC: 34.6 G/DL
MCV RBC AUTO: 87 FL
MONOCYTES # BLD AUTO: 1.9 K/UL
MONOCYTES NFR BLD: 45 %
NEUTROPHILS # BLD AUTO: 0.9 K/UL
NEUTROPHILS NFR BLD: 23.1 %
PATHOLOGIST INTERPRETATION IFE: NORMAL
PATHOLOGIST INTERPRETATION SPE: NORMAL
PLATELET # BLD AUTO: 135 K/UL
PLATELET BLD QL SMEAR: ABNORMAL
PMV BLD AUTO: 12 FL
POCT GLUCOSE: 123 MG/DL (ref 70–110)
POCT GLUCOSE: 134 MG/DL (ref 70–110)
POCT GLUCOSE: 207 MG/DL (ref 70–110)
POCT GLUCOSE: 338 MG/DL (ref 70–110)
POLYCHROMASIA BLD QL SMEAR: ABNORMAL
POTASSIUM SERPL-SCNC: 3.1 MMOL/L
PROT SERPL-MCNC: 4.8 G/DL
RBC # BLD AUTO: 2.5 M/UL
SODIUM SERPL-SCNC: 135 MMOL/L
WBC # BLD AUTO: 4.16 K/UL

## 2018-01-24 PROCEDURE — G8987 SELF CARE CURRENT STATUS: HCPCS | Mod: CJ

## 2018-01-24 PROCEDURE — 25000003 PHARM REV CODE 250: Performed by: HOSPITALIST

## 2018-01-24 PROCEDURE — 25000003 PHARM REV CODE 250: Performed by: NURSE PRACTITIONER

## 2018-01-24 PROCEDURE — G8988 SELF CARE GOAL STATUS: HCPCS | Mod: CI

## 2018-01-24 PROCEDURE — 99233 SBSQ HOSP IP/OBS HIGH 50: CPT | Mod: ,,, | Performed by: INTERNAL MEDICINE

## 2018-01-24 PROCEDURE — 11000001 HC ACUTE MED/SURG PRIVATE ROOM

## 2018-01-24 PROCEDURE — 80053 COMPREHEN METABOLIC PANEL: CPT

## 2018-01-24 PROCEDURE — 25000003 PHARM REV CODE 250: Performed by: INTERNAL MEDICINE

## 2018-01-24 PROCEDURE — 97161 PT EVAL LOW COMPLEX 20 MIN: CPT

## 2018-01-24 PROCEDURE — 97165 OT EVAL LOW COMPLEX 30 MIN: CPT

## 2018-01-24 PROCEDURE — 85025 COMPLETE CBC W/AUTO DIFF WBC: CPT

## 2018-01-24 PROCEDURE — 36415 COLL VENOUS BLD VENIPUNCTURE: CPT

## 2018-01-24 RX ADMIN — NYSTATIN 500000 UNITS: 100000 SUSPENSION ORAL at 07:01

## 2018-01-24 RX ADMIN — FAMOTIDINE 20 MG: 20 TABLET, FILM COATED ORAL at 08:01

## 2018-01-24 RX ADMIN — INSULIN ASPART 8 UNITS: 100 INJECTION, SOLUTION INTRAVENOUS; SUBCUTANEOUS at 05:01

## 2018-01-24 RX ADMIN — INSULIN ASPART 4 UNITS: 100 INJECTION, SOLUTION INTRAVENOUS; SUBCUTANEOUS at 11:01

## 2018-01-24 RX ADMIN — ACETAMINOPHEN 650 MG: 325 TABLET ORAL at 06:01

## 2018-01-24 RX ADMIN — PRAVASTATIN SODIUM 40 MG: 40 TABLET ORAL at 09:01

## 2018-01-24 RX ADMIN — NYSTATIN 500000 UNITS: 100000 SUSPENSION ORAL at 11:01

## 2018-01-24 RX ADMIN — CLOPIDOGREL BISULFATE 75 MG: 75 TABLET ORAL at 09:01

## 2018-01-24 RX ADMIN — ASPIRIN 325 MG: 325 TABLET, DELAYED RELEASE ORAL at 09:01

## 2018-01-24 RX ADMIN — NYSTATIN 500000 UNITS: 100000 SUSPENSION ORAL at 08:01

## 2018-01-24 RX ADMIN — FAMOTIDINE 20 MG: 20 TABLET, FILM COATED ORAL at 09:01

## 2018-01-24 RX ADMIN — MICONAZOLE NITRATE: 20 OINTMENT TOPICAL at 08:01

## 2018-01-24 RX ADMIN — MICONAZOLE NITRATE: 20 OINTMENT TOPICAL at 09:01

## 2018-01-24 RX ADMIN — METOPROLOL SUCCINATE 25 MG: 25 TABLET, EXTENDED RELEASE ORAL at 09:01

## 2018-01-24 RX ADMIN — METOPROLOL SUCCINATE 25 MG: 25 TABLET, EXTENDED RELEASE ORAL at 08:01

## 2018-01-24 RX ADMIN — RAMIPRIL 2.5 MG: 2.5 CAPSULE ORAL at 09:01

## 2018-01-24 RX ADMIN — POTASSIUM BICARBONATE 50 MEQ: 25 TABLET, EFFERVESCENT ORAL at 11:01

## 2018-01-24 RX ADMIN — NYSTATIN 500000 UNITS: 100000 SUSPENSION ORAL at 05:01

## 2018-01-24 NOTE — CONSULTS
Consult Note  Infectious Disease    Consult Requested By: Michel Woody MD    Reason for Consult: pancytopenia and fever    SUBJECTIVE:     History of Present Illness:  Patient is a 77 y.o. female presents with mouth ulcers. She has severe RA and has been on methotrexate and plaquenil for 3 years. She does not recall when her last visit to rheumatology occurred. She is now here with painful mouth ulcers and vaginal ulcers since 1-2 weeks.  Her wbc is better. She has no fever.    Past Medical History:   Diagnosis Date    Arthritis     Rheumatoid  arthritis    Cardiac arrhythmia     Coronary artery disease     Diabetes mellitus type II     Pneumonia     as a kid, with empyema    Renal insufficiency     Rheumatoid arthritis(714.0)      Past Surgical History:   Procedure Laterality Date    CARDIAC DEFIBRILLATOR PLACEMENT      CORONARY ARTERY BYPASS GRAFT       4 para 3       HYSTERECTOMY      osteomyelitis      TONSILLECTOMY      TOTAL KNEE ARTHROPLASTY      TOTAL SHOULDER ARTHROPLASTY       Family History   Problem Relation Age of Onset    Heart disease Mother     Diabetes Mother     Cancer Maternal Uncle      Social History   Substance Use Topics    Smoking status: Never Smoker    Smokeless tobacco: Never Used      Comment: Lives with son, extended family.  Three children.    Alcohol use Yes      Comment: only during holidays       Review of patient's allergies indicates:   Allergen Reactions    Sulfa (sulfonamide antibiotics) Hives    Percocet [oxycodone-acetaminophen] Nausea And Vomiting        Antibiotics     None          Review of Systems:  Constitutional: no fever or chills  Eyes: no visual changes  ENT: no nasal congestion or sore throat  Respiratory: no cough or shortness of breath  Cardiovascular: no chest pain or palpitations  Gastrointestinal: no nausea or vomiting, no abdominal pain or change in bowel habits  Genitourinary: no hematuria or dysuria  Integument/Breast:  no rash or pruritis  Musculoskeletal: no arthralgias or myalgias  Neurological: no seizures or tremors    OBJECTIVE:     Vital Signs (Most Recent)  Temp: 97.2 °F (36.2 °C) (01/24/18 1641)  Pulse: 78 (01/24/18 1641)  Resp: 17 (01/24/18 1641)  BP: 125/61 (01/24/18 1641)  SpO2: 97 % (01/24/18 1641)    Temperature Range Min/Max (Last 24H):  Temp:  [97.1 °F (36.2 °C)-99.9 °F (37.7 °C)]     Physical Exam:  General: appears stated age  Eyes: conjunctivae/corneas clear. PERRL..  HENT: Head:normocephalic, atraumatic. Ears:bilateral TM's and external ear canals normal. Nose: Nares normal. Septum midline. Mucosa normal. No drainage or sinus tenderness., no discharge. Throat: several small ulcers of lips and oral mucosa resolving  Neck: supple, symmetrical, trachea midline, no JVD and thyroid not enlarged, symmetric, no tenderness/mass/nodules  Lungs:  clear to auscultation bilaterally and normal respiratory effort  Cardiovascular: Heart: regular rate and rhythm, S1, S2 normal, no murmur, click, rub or gallop. Chest Wall: no tenderness. Extremities: no cyanosis or edema, or clubbing. Pulses: 2+ and symmetric.  Abdomen/Rectal: Abdomen: soft, non-tender non-distented; bowel sounds normal; no masses,  no organomegaly. Rectal: not examined  Skin: vaginal introitus with hyperemia  Musculoskeletal:no clubbing, cyanosis    Laboratory:  CBC    Recent Labs  Lab 01/24/18  0655   WBC 4.16   RBC 2.50*   HGB 7.5*   HCT 21.7*   *     BMP    Recent Labs  Lab 01/24/18  0653   CO2 22*   BUN 6*   CREATININE 0.7   CALCIUM 8.2*       Recent Labs  Lab 01/21/18  1450   COLORU Yellow   SPECGRAV 1.020   PHUR 5.0   PROTEINUA 1+*   BACTERIA Rare   NITRITE Negative   LEUKOCYTESUR Negative   UROBILINOGEN Negative   HYALINECASTS 0     Microbiology Results (last 7 days)     Procedure Component Value Units Date/Time    Blood culture [[id number]] Collected:  01/21/18 1500    Order Status:  Completed Specimen:  Blood Updated:  01/23/18 2106     Blood  Culture, Routine No Growth to date     Blood Culture, Routine No Growth to date     Blood Culture, Routine No Growth to date    Blood culture [[id number]] Collected:  01/21/18 1505    Order Status:  Completed Specimen:  Blood Updated:  01/23/18 2103     Blood Culture, Routine No Growth to date     Blood Culture, Routine No Growth to date     Blood Culture, Routine No Growth to date    Urine culture [[id number]] Collected:  01/21/18 1450    Order Status:  Completed Specimen:  Urine from Urine, Catheterized Updated:  01/23/18 0808     Urine Culture, Routine No growth    Urine culture **CANNOT BE ORDERED STAT** [[id number]]  (Susceptibility) Collected:  01/20/18 1751    Order Status:  Completed Specimen:  Urine from Urine Updated:  01/22/18 0913     Urine Culture, Routine Results called to and read back by:Tara Weber-ICU 09:13  01/22/2018     Urine Culture, Routine --     ESCHERICHIA COLI ESBL  10,000 - 49,999 cfu/ml            Diagnostic Results:  Labs: Reviewed  X-Ray: Reviewed    ASSESSMENT/PLAN:     Active Hospital Problems    Diagnosis  POA    *Diabetic ketoacidosis without coma associated with type 2 diabetes mellitus [E11.10]  Yes    Pancytopenia [D61.818]  Yes    Neutropenia [D70.9]  Yes    Oral thrush [B37.0]  Yes    Vaginal candidiasis [B37.3]  Yes    Acquired immunocompromised state [D84.9]  Yes    Noncompliance with medication regimen [Z91.14]  Not Applicable    AICD (automatic cardioverter/defibrillator) present [Z95.810]  Yes    CAD (coronary artery disease) [I25.10]  Yes    Essential hypertension [I10]  Yes    Rheumatoid arthritis [M06.9]  Yes      Resolved Hospital Problems    Diagnosis Date Resolved POA   No resolved problems to display.       1. Pancytopenia and mucositis in a patient with ra on methotrexate  This is methotrexate toxicity till proven otherwise  Dc mtx  Supplement folinic acid  Check mtx level  2. Fever sec to neutropenia, single fever spike  Urine culture with 40-350019  esbl ecoli on 1/20/18 and neg 1/21/18- leslie to zosyn is 1:16  Wbc slowly climbing with folinic acid  Suspect contaminated urine culture   dc abx today   I do NOT think she has yeast  I will sign off

## 2018-01-24 NOTE — SUBJECTIVE & OBJECTIVE
Interval History: much more alert.    Review of Systems   Constitutional: Negative for chills and fever.   HENT: Positive for mouth sores. Negative for congestion and rhinorrhea.    Eyes: Negative for photophobia and visual disturbance.   Respiratory: Negative for cough and shortness of breath.    Cardiovascular: Negative for chest pain and leg swelling.   Gastrointestinal: Negative for abdominal pain, nausea and vomiting.   Genitourinary: Negative for vaginal pain.   Musculoskeletal: Negative for joint swelling and neck stiffness.   Skin: Negative for rash and wound.   Neurological: Negative for dizziness and light-headedness.   Psychiatric/Behavioral: Negative for agitation and behavioral problems.     Objective:     Vital Signs (Most Recent):  Temp: 99.9 °F (37.7 °C) (01/23/18 2342)  Pulse: 77 (01/23/18 2342)  Resp: 17 (01/23/18 2342)  BP: 137/62 (01/23/18 2342)  SpO2: 100 % (01/23/18 2342) Vital Signs (24h Range):  Temp:  [97.5 °F (36.4 °C)-99.9 °F (37.7 °C)] 99.9 °F (37.7 °C)  Pulse:  [64-77] 77  Resp:  [17-19] 17  SpO2:  [99 %-100 %] 100 %  BP: (112-137)/(53-62) 137/62     Weight: 54.7 kg (120 lb 9.5 oz)  Body mass index is 19.46 kg/m².    Intake/Output Summary (Last 24 hours) at 01/24/18 0649  Last data filed at 01/24/18 0555   Gross per 24 hour   Intake              800 ml   Output                0 ml   Net              800 ml      Physical Exam   Constitutional: She appears well-developed and well-nourished. No distress.   HENT:   Right Ear: External ear normal.   Left Ear: External ear normal.   Nose: Nose normal.   Eyes: EOM are normal. Pupils are equal, round, and reactive to light. No scleral icterus.   Neck: Normal range of motion. Neck supple. No thyromegaly present.   Cardiovascular: Normal rate and normal heart sounds.  Exam reveals no friction rub.    No murmur heard.  Pulmonary/Chest: Effort normal and breath sounds normal. No respiratory distress. She has no wheezes. She has no rales.    Abdominal: Soft. Bowel sounds are normal. She exhibits no mass. There is no tenderness.   Musculoskeletal: Normal range of motion. She exhibits no edema or deformity.   Neurological: She is alert. No cranial nerve deficit.   Skin: Skin is warm and dry. No pallor.       Significant Labs: All pertinent labs within the past 24 hours have been reviewed.    Significant Imaging: I have reviewed and interpreted all pertinent imaging results/findings within the past 24 hours.

## 2018-01-24 NOTE — PLAN OF CARE
Problem: Patient Care Overview  Goal: Plan of Care Review  Outcome: Ongoing (interventions implemented as appropriate)  Patient remains free from injury and falls. Calm and cooperative. Come confusion throughout shift. Re-oriented as needed. Bath and linen change given. Complains of pain to tongue and mouth. Benzocaine spray administered PRN. Plan of care continued.

## 2018-01-24 NOTE — PLAN OF CARE
Problem: Physical Therapy Goal  Goal: Physical Therapy Goal  Goals to be met by: 18    Patient will increase functional independence with mobility by performin. Supine to sit with supervision  2. Sit to stand transfer with supervision  3. Gait  x 250 feet with Supervision using rolling walker if needed  4. Lower extremity exercise program x30 reps per handout, with supervision      Patient would continue to benefit from PT while in the hospital.

## 2018-01-24 NOTE — PROGRESS NOTES
[facility]  Hematology/Oncology  Progress Note    Patient Name: Ruthann Doe  Admission Date: 1/20/2018  Hospital Length of Stay: 4 days  Code Status: Full Code     Subjective:     HPI:  Pt is a 75 y/o female with pmhx of severe RA , CAD, DMType 2, presents to ED with c/o painful mouth and vaginal  ulcers and generalized weakness past week. Pt is a poor historian. She is followed by Rheumatology at an outside facility. She is taking MTX and plaquenil. She reports she ran out of daily folate supplementation. She reports episodes of hematuria. No dysuria. Pt with febrile episode with temp 101. She is being treated with diflucan.No SOB/CP.CBC reveals wbc .55  Hb 10. 2g/dl Hct 28.7% plt ct 32k. . No N/V. Family members ( form out of town)  at bedside. Pt lives alone.     Interval History: Pt doing better. Mouth sores slowly improving. No SOB/CP.     Oncology Treatment Plan:   [No treatment plan]    Medications:  Continuous Infusions:  Scheduled Meds:   aspirin  325 mg Oral Daily    clopidogrel  75 mg Oral Daily    famotidine  20 mg Oral BID    metoprolol succinate  25 mg Oral BID    miconazole nitrate 2%   Topical (Top) BID    nystatin  500,000 Units Oral QID (WM & HS)    pravastatin  40 mg Oral Daily    ramipril  2.5 mg Oral Daily     PRN Meds:acetaminophen, acetaminophen, benzocaine, dextrose 10 % in water (D10W), dextrose 50%, dextrose 50%, glucagon (human recombinant), glucose, glucose, influenza, insulin aspart, ondansetron, pneumoc 13-brad conj-dip cr(PF), sodium chloride 0.9%     Review of Systems   Constitutional: Positive for appetite change and fatigue. Negative for fever and unexpected weight change.   HENT: Positive for mouth sores.    Eyes: Negative for visual disturbance.   Respiratory: Negative for cough and shortness of breath.    Cardiovascular: Negative for chest pain.   Gastrointestinal: Negative for abdominal pain and diarrhea.   Genitourinary: Negative for  frequency.   Musculoskeletal: Negative for back pain.   Skin: Negative for rash.   Neurological: Positive for weakness. Negative for headaches.   Hematological: Negative for adenopathy.   Psychiatric/Behavioral: The patient is not nervous/anxious.      Objective:     Vital Signs (Most Recent):  Temp: 97.1 °F (36.2 °C) (01/24/18 1123)  Pulse: 68 (01/24/18 1123)  Resp: 18 (01/24/18 1123)  BP: (!) 133/58 (01/24/18 1123)  SpO2: 100 % (01/24/18 1123) Vital Signs (24h Range):  Temp:  [97.1 °F (36.2 °C)-99.9 °F (37.7 °C)] 97.1 °F (36.2 °C)  Pulse:  [64-77] 68  Resp:  [17-18] 18  SpO2:  [98 %-100 %] 100 %  BP: (112-137)/(53-62) 133/58     Weight: 54.7 kg (120 lb 9.5 oz)  Body mass index is 19.46 kg/m².  Body surface area is 1.6 meters squared.      Intake/Output Summary (Last 24 hours) at 01/24/18 1240  Last data filed at 01/24/18 0555   Gross per 24 hour   Intake              800 ml   Output                0 ml   Net              800 ml       Physical Exam   Constitutional: She appears well-developed and well-nourished.   chronically-ill appearing    HENT:   Head: Normocephalic.   Mouth/Throat: No oropharyngeal exudate.   Eyes: Conjunctivae and EOM are normal.   Neck: Normal range of motion. Neck supple.   Cardiovascular: Normal rate, regular rhythm and normal heart sounds.    No murmur heard.  Pulmonary/Chest: Effort normal and breath sounds normal. She has no wheezes. She has no rales.   Abdominal: Soft. Bowel sounds are normal. There is no hepatosplenomegaly. There is no tenderness.   Musculoskeletal: Normal range of motion. She exhibits no edema or tenderness.   Neurological: She is alert.   disoriented   Skin: Skin is warm and dry. No ecchymosis, no petechiae and no rash noted. No erythema.       Significant Labs:   All pertinent labs within the past 24 hours have been reviewed    Diagnostic Results:  I have reviewed and interpreted all pertinent imaging results/findings within the past 24 hours    Assessment/Plan:      Oral thrush    Tx per ID         Neutropenia    Drug-toxicity related  ANC improved to 600        Pancytopenia    Likely drug-induced, MTX induced  HOLD MTX and Plaquenil   Transfuse Hb if <7g/dl  Transfuse plts if <20k w/bleeding or <10k    PBS-no blasts , no morphologic abnormalities of myeloid or lymphoid   Coag parameters do not reveal evid of DIC  No evid of B12 deficiency  HIV neg    Neutropenia resolving    Hb 7.5g/dl   Cont to monitor            Rheumatoid arthritis    MTX and Plaquenil on hold  S/p Leucovorin therapy   MTX level <0.4   Hold LCV              Ashley Covington MD  Hematology/Oncology  [facility] [place]

## 2018-01-24 NOTE — PT/OT/SLP EVAL
Occupational Therapy   Evaluation    Name: Ruthann Doe  MRN: [id number]  Admitting Diagnosis:  Diabetic ketoacidosis without coma associated with type 2 diabetes mellitus      Recommendations:     Discharge Recommendations: home health OT (with family assist)  Discharge Equipment Recommendations:  none  Barriers to discharge:  None (Patient lives alone but states she has support from family and neighbors)    History:     Occupational Profile:  Living Environment:  The patient lives alone in a SS house with 6 SHAHLA with B HR.  Previous level of function: (I) with all ADL  Roles and Routines: Patient drives and cooks  Equipment Owned:  none  Assistance upon Discharge:  family and neighbors    Past Medical History:   Diagnosis Date    Arthritis     Rheumatoid  arthritis    Cardiac arrhythmia     Coronary artery disease     Diabetes mellitus type II     Pneumonia     as a kid, with empyema    Renal insufficiency     Rheumatoid arthritis(714.0)        Past Surgical History:   Procedure Laterality Date    CARDIAC DEFIBRILLATOR PLACEMENT      CORONARY ARTERY BYPASS GRAFT       4 para 3       HYSTERECTOMY      osteomyelitis      TONSILLECTOMY      TOTAL KNEE ARTHROPLASTY      TOTAL SHOULDER ARTHROPLASTY         Subjective     Chief Complaint: feels weak  Patient/Family stated goals: get stronger to go home  Communicated with: nurseSarah  prior to session.  Pain/Comfort:  · Pain Rating 1: 0/10 (at rest)    Patients cultural, spiritual, Voodoo conflicts given the current situation: no    Objective:     Patient found with: peripheral IV    General Precautions: Standard, neutropenic, fall   Orthopedic Precautions:N/A   Braces: N/A     Occupational Performance:    Bed Mobility:    · Patient completed Scooting/Bridging with stand by assistance  · Patient completed Supine to Sit with stand by assistance    Functional Mobility/Transfers:  · Patient completed Sit <> Stand Transfer with contact guard  "assistance  with  no assistive device   Functional Mobility: The patient amb with HHA around the bed to the chair. The patient "plopped" in the chair and c/o vaginal pain with hard landing on the air cushion.    Activities of Daily Living:  · UB Dressing: contact guard assistance    · LB Dressing: stand by assistance to don B socks   · Grooming: The patient wiped her face and brushed her hair while seated in the chair.    Cognitive/Visual Perceptual:  Cognitive/Psychosocial Skills:     -       Oriented to: Person, Place and Situation   -       Follows Commands/attention:Follows two-step commands  -       Communication: clear/fluent  -       Memory: No Deficits noted  -       Safety awareness/insight to disability: intact   -       Mood/Affect/Coping skills/emotional control: Appropriate to situation    Physical Exam:  Balance: -       good sitting, fair+ standing  Postural examination/scapula alignment:    -       Rounded shoulders  -       Forward head  Skin integrity: Visible skin intact and (mouth and vaginal sores per chart)  Upper Extremity Range of Motion:     -       Right Upper Extremity: WFL  -       Left Upper Extremity: WFL  Upper Extremity Strength:    -       Right Upper Extremity: WFL  -       Left Upper Extremity: WFL   Strength:    -       Right Upper Extremity: WFL  -       Left Upper Extremity: WFL    Patient left up in chair with all lines intact, call button in reach and nurseSarah notified    [facility] 6 Click:  AMPA Total Score: 21    Treatment & Education:  The patient tolerated OT eval and was able to remain seated in the chair. The patient was educated re: OT role and POC.   Education:    Assessment:     Ruthann Doe is a 77 y.o. female with a medical diagnosis of Diabetic ketoacidosis without coma associated with type 2 diabetes mellitus.  She presents with decreased functional mobility R/T generalized weakness.The patient states she will have assist as needed at home.  " "Performance deficits affecting function are weakness, impaired endurance, impaired self care skills, impaired balance, gait instability, impaired functional mobilty, impaired cardiopulmonary response to activity, impaired skin, pain.      Rehab Prognosis:  good; patient would benefit from acute skilled OT services to address these deficits and reach maximum level of function.         Clinical Decision Makin.  OT Low:  "Pt evaluation falls under low complexity for evaluation coding due to performance deficits noted in 1-3 areas as stated above and 0 co-morbities affecting current functional status. Data obtained from problem focused assessments. No modifications or assistance was required for completion of evaluation. Only brief occupational profile and history review completed."     Plan:     Patient to be seen 3 x/week to address the above listed problems via self-care/home management, therapeutic activities, therapeutic exercises  · Plan of Care Expires: 18  · Plan of Care Reviewed with: patient    This Plan of care has been discussed with the patient who was involved in its development and understands and is in agreement with the identified goals and treatment plan    GOALS:    Occupational Therapy Goals        Problem: Occupational Therapy Goal    Goal Priority Disciplines Outcome Interventions   Occupational Therapy Goal     OT, PT/OT Ongoing (interventions implemented as appropriate)    Description:  Goals to be met by: 2018    Patient will increase functional independence with ADLs by performing:    UE Dressing with Stand-by Assistance.  LE Dressing with Stand-by Assistance.  Grooming while standing at sink with Stand-by Assistance.  Toileting from toilet with Stand-by Assistance for hygiene and clothing management.   Supine to sit with Supervision.  Stand pivot transfers with Stand-by Assistance.  Toilet transfer to toilet with Stand-by Assistance.  Upper extremity exercise program x15 reps " per handout, with assistance as needed.                      Time Tracking:     OT Date of Treatment: 01/24/18  OT Start Time: 0943  OT Stop Time: 1004  OT Total Time (min): 21 min    Billable Minutes:Evaluation 21 (co-eval with PT)    Alida Juarez, OT  1/24/2018

## 2018-01-24 NOTE — ASSESSMENT & PLAN NOTE
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

## 2018-01-24 NOTE — SUBJECTIVE & OBJECTIVE
Interval History: Pt doing better. Mouth sores slowly improving. No SOB/CP.     Oncology Treatment Plan:   [No treatment plan]    Medications:  Continuous Infusions:  Scheduled Meds:   aspirin  325 mg Oral Daily    clopidogrel  75 mg Oral Daily    famotidine  20 mg Oral BID    metoprolol succinate  25 mg Oral BID    miconazole nitrate 2%   Topical (Top) BID    nystatin  500,000 Units Oral QID (WM & HS)    pravastatin  40 mg Oral Daily    ramipril  2.5 mg Oral Daily     PRN Meds:acetaminophen, acetaminophen, benzocaine, dextrose 10 % in water (D10W), dextrose 50%, dextrose 50%, glucagon (human recombinant), glucose, glucose, influenza, insulin aspart, ondansetron, pneumoc 13-brad conj-dip cr(PF), sodium chloride 0.9%     Review of Systems   Constitutional: Positive for appetite change and fatigue. Negative for fever and unexpected weight change.   HENT: Positive for mouth sores.    Eyes: Negative for visual disturbance.   Respiratory: Negative for cough and shortness of breath.    Cardiovascular: Negative for chest pain.   Gastrointestinal: Negative for abdominal pain and diarrhea.   Genitourinary: Negative for frequency.   Musculoskeletal: Negative for back pain.   Skin: Negative for rash.   Neurological: Positive for weakness. Negative for headaches.   Hematological: Negative for adenopathy.   Psychiatric/Behavioral: The patient is not nervous/anxious.      Objective:     Vital Signs (Most Recent):  Temp: 97.1 °F (36.2 °C) (01/24/18 1123)  Pulse: 68 (01/24/18 1123)  Resp: 18 (01/24/18 1123)  BP: (!) 133/58 (01/24/18 1123)  SpO2: 100 % (01/24/18 1123) Vital Signs (24h Range):  Temp:  [97.1 °F (36.2 °C)-99.9 °F (37.7 °C)] 97.1 °F (36.2 °C)  Pulse:  [64-77] 68  Resp:  [17-18] 18  SpO2:  [98 %-100 %] 100 %  BP: (112-137)/(53-62) 133/58     Weight: 54.7 kg (120 lb 9.5 oz)  Body mass index is 19.46 kg/m².  Body surface area is 1.6 meters squared.      Intake/Output Summary (Last 24 hours) at 01/24/18 1240  Last  data filed at 01/24/18 0555   Gross per 24 hour   Intake              800 ml   Output                0 ml   Net              800 ml       Physical Exam   Constitutional: She appears well-developed and well-nourished.   chronically-ill appearing    HENT:   Head: Normocephalic.   Mouth/Throat: No oropharyngeal exudate.   Eyes: Conjunctivae and EOM are normal.   Neck: Normal range of motion. Neck supple.   Cardiovascular: Normal rate, regular rhythm and normal heart sounds.    No murmur heard.  Pulmonary/Chest: Effort normal and breath sounds normal. She has no wheezes. She has no rales.   Abdominal: Soft. Bowel sounds are normal. There is no hepatosplenomegaly. There is no tenderness.   Musculoskeletal: Normal range of motion. She exhibits no edema or tenderness.   Neurological: She is alert.   disoriented   Skin: Skin is warm and dry. No ecchymosis, no petechiae and no rash noted. No erythema.       Significant Labs:   All pertinent labs within the past 24 hours have been reviewed    Diagnostic Results:  I have reviewed and interpreted all pertinent imaging results/findings within the past 24 hours

## 2018-01-24 NOTE — PLAN OF CARE
Problem: Occupational Therapy Goal  Goal: Occupational Therapy Goal  Goals to be met by: 2/7/2018    Patient will increase functional independence with ADLs by performing:    UE Dressing with Stand-by Assistance.  LE Dressing with Stand-by Assistance.  Grooming while standing at sink with Stand-by Assistance.  Toileting from toilet with Stand-by Assistance for hygiene and clothing management.   Supine to sit with Supervision.  Stand pivot transfers with Stand-by Assistance.  Toilet transfer to toilet with Stand-by Assistance.  Upper extremity exercise program x15 reps per handout, with assistance as needed.    Outcome: Ongoing (interventions implemented as appropriate)  Patient will benefit from OT to address functional deficits.

## 2018-01-24 NOTE — PROGRESS NOTES
[facility] Hospital Medicine  Progress Note    Patient Name: Ruthann Doe  MRN: [id number]  Patient Class: IP- Inpatient   Admission Date: 1/20/2018  Length of Stay: 4 days  Attending Physician: Michel Woody MD  Primary Care Provider: Jesse Partida MD        Subjective:     Principal Problem:Diabetic ketoacidosis without coma associated with type 2 diabetes mellitus    HPI:  Ms. Doe is a 76 yo woman with NIDDM (A1c 10.2%), CKD3, CAD, and RA who presented for painful mouth lesions. She also complains of vaginal irritation. She was found to be neutropenic and thrombocytopenic. She has been non-compliant with her home regimen of glimepiride. She was found to be in DKA and admitted to the ICU on an insulin infusion. Please see H&P for full detail.     Hospital Course:  She was given IV fluids and started on insulin drip and her DKA quickly resolved. She was started on basal/bolus insulin. Her hemoglobin A1c was 10.2%. She may need chronic insulin therapy. Basal insulin decreased for hypoglycemia.  Hematology and ID were consulted for pancytopenia. She was afebrile. She had mucositis. She was thought to have methotrexate toxicity and was started on leucovorin (folinic acid). MTX level not elevated. BCx NGTD. UCx from 1/20 with ESBL E coli but <100k cfu. Repeat UCx NGTD. Zosyn until ANC improves.pancytopenia is improving.  Consulted PT,OT     Interval History: much more alert.    Review of Systems   Constitutional: Negative for chills and fever.   HENT: Positive for mouth sores. Negative for congestion and rhinorrhea.    Eyes: Negative for photophobia and visual disturbance.   Respiratory: Negative for cough and shortness of breath.    Cardiovascular: Negative for chest pain and leg swelling.   Gastrointestinal: Negative for abdominal pain, nausea and vomiting.   Genitourinary: Negative for vaginal pain.   Musculoskeletal: Negative for joint swelling and neck stiffness.   Skin:  Negative for rash and wound.   Neurological: Negative for dizziness and light-headedness.   Psychiatric/Behavioral: Negative for agitation and behavioral problems.     Objective:     Vital Signs (Most Recent):  Temp: 99.9 °F (37.7 °C) (01/23/18 2342)  Pulse: 77 (01/23/18 2342)  Resp: 17 (01/23/18 2342)  BP: 137/62 (01/23/18 2342)  SpO2: 100 % (01/23/18 2342) Vital Signs (24h Range):  Temp:  [97.5 °F (36.4 °C)-99.9 °F (37.7 °C)] 99.9 °F (37.7 °C)  Pulse:  [64-77] 77  Resp:  [17-19] 17  SpO2:  [99 %-100 %] 100 %  BP: (112-137)/(53-62) 137/62     Weight: 54.7 kg (120 lb 9.5 oz)  Body mass index is 19.46 kg/m².    Intake/Output Summary (Last 24 hours) at 01/24/18 0649  Last data filed at 01/24/18 0555   Gross per 24 hour   Intake              800 ml   Output                0 ml   Net              800 ml      Physical Exam   Constitutional: She appears well-developed and well-nourished. No distress.   HENT:   Right Ear: External ear normal.   Left Ear: External ear normal.   Nose: Nose normal.   Eyes: EOM are normal. Pupils are equal, round, and reactive to light. No scleral icterus.   Neck: Normal range of motion. Neck supple. No thyromegaly present.   Cardiovascular: Normal rate and normal heart sounds.  Exam reveals no friction rub.    No murmur heard.  Pulmonary/Chest: Effort normal and breath sounds normal. No respiratory distress. She has no wheezes. She has no rales.   Abdominal: Soft. Bowel sounds are normal. She exhibits no mass. There is no tenderness.   Musculoskeletal: Normal range of motion. She exhibits no edema or deformity.   Neurological: She is alert. No cranial nerve deficit.   Skin: Skin is warm and dry. No pallor.       Significant Labs: All pertinent labs within the past 24 hours have been reviewed.    Significant Imaging: I have reviewed and interpreted all pertinent imaging results/findings within the past 24 hours.    Assessment/Plan:      * Diabetic ketoacidosis without coma associated with type  2 diabetes mellitus    Resolved. Transitioned to SSI.. Adjusted for hypoglycemia.          Noncompliance with medication regimen    Likely contributory to DKA.         Acquired immunocompromised state    Immunocompromised state due to treatment with methotrexate and plaquenil for rheumatoid arthritis treatment.        Vaginal candidiasis    Reports improvement with miconazole and diflucan. Candidiasis vs mucositis or both? I don't see KOH test.        Oral thrush    Symptomatic improvement with nystatin/diflucan and hurricane spray.         Neutropenia    Drug induced. Neutropenic precautions.improving.        Pancytopenia    Appreciate ID and hem recs. Hold MTX and plaquenil. Improving.        AICD (automatic cardioverter/defibrillator) present    No events. Stable.        Essential hypertension    Home metoprolol and ramipril held for hypotension.        Rheumatoid arthritis    Holding home meds        CAD (coronary artery disease)    No acute issue. Cont home meds.          VTE Risk Mitigation         Ordered     Medium Risk of VTE  Once      01/20/18 2347     Place CHANO hose  Until discontinued      01/20/18 2347     Place sequential compression device  Until discontinued      01/20/18 2347              Michel Woody MD  Department of Hospital Medicine   [facility]

## 2018-01-24 NOTE — PROGRESS NOTES
TN met with pt who stated she wants to be discharged home with her [place] dominguez and cat. Pt's son was not in the room. TN contacted pt's son, Tomy Ivy, at [phone] at his request to talk with CM; left a message to contact.

## 2018-01-24 NOTE — PT/OT/SLP EVAL
Physical Therapy Evaluation    Patient Name:  Ruthann Doe   MRN:  [id number]    Recommendations:     Discharge Recommendations:  home health PT   Discharge Equipment Recommendations: none   Barriers to discharge: Decreased caregiver support (patient lives alone)    Assessment:     Ruthann Doe is a 77 y.o. female admitted with a medical diagnosis of Diabetic ketoacidosis without coma associated with type 2 diabetes mellitus.  She presents with the following impairments/functional limitations:  impaired endurance, weakness, impaired cardiopulmonary response to activity, impaired functional mobilty, gait instability, impaired balance, decreased lower extremity function, decreased safety awareness.    Rehab Prognosis:  good; patient would benefit from acute skilled PT services to address these deficits and reach maximum level of function.      Recent Surgery: * No surgery found *      Plan:     During this hospitalization, patient to be seen 6 x/week to address the above listed problems via gait training, therapeutic activities, therapeutic exercises  · Plan of Care Expires:  02/07/18   Plan of Care Reviewed with: patient    Subjective     Communicated with nurse Smith prior to session.  Patient found supine in bed upon PT entry to room, agreeable to evaluation.      Chief Complaint: Weakness from laying in bed.   Patient comments/goals: To go home.   Pain/Comfort:  · Pain Rating 1: 0/10    Living Environment:  Patient lives at home alone. She has 6 steps with bilateral handrails to get into house. Prior to admission, patients level of function was independent. DME owned (not currently used): rolling walker and single point cane. Upon discharge, patient will have assistance from neighbors.    Objective:     Patient found with: peripheral IV     General Precautions: Standard, fall, neutropenic, and contact due to ESBL in urine. H & H 7.5/21.7.  Orthopedic Precautions:N/A   Braces: N/A      Exams:  · Cognitive Exam:  Patient is oriented to Person, Place, Time and Situation and follows 100 % of multi step commands   · Gross Motor Coordination:  WFL  · Postural Exam:  Patient presented with the following abnormalities:    · -       No postural abnormalities identified  · Sensation:    · -       Intact  · Skin Integrity:      · -       Skin integrity: Visible skin intact  · RLE ROM: WFL  · RLE Strength: 3+/5  · LLE ROM: WFL  · LLE Strength: 3+/5    Functional Mobility:  · Bed Mobility:     · Supine to Sit: contact guard assistance  · Transfers:     · Sit to Stand:  contact guard assistance with no AD x2 trials from bed   · Gait:  ~26ft with right hand held assist and minimal assistance. She demonstrated minimal right to left swaying but no loss of balance.     AM-PAC 6 CLICK MOBILITY  Total Score:19     Patient left up in chair on green air cushion with all lines intact, call button in reach and nurse notified.    GOALS:    Physical Therapy Goals        Problem: Physical Therapy Goal    Goal Priority Disciplines Outcome Goal Variances Interventions   Physical Therapy Goal     PT/OT, PT      Description:  Goals to be met by: 18    Patient will increase functional independence with mobility by performin. Supine to sit with supervision  2. Sit to stand transfer with supervision  3. Gait  x 250 feet with Supervision using rolling walker if needed  4. Lower extremity exercise program x30 reps per handout, with supervision                      History:     Past Medical History:   Diagnosis Date    Arthritis     Rheumatoid  arthritis    Cardiac arrhythmia     Coronary artery disease     Diabetes mellitus type II     Pneumonia     as a kid, with empyema    Renal insufficiency     Rheumatoid arthritis(714.0)        Past Surgical History:   Procedure Laterality Date    CARDIAC DEFIBRILLATOR PLACEMENT      CORONARY ARTERY BYPASS GRAFT       4 para 3       HYSTERECTOMY       osteomyelitis      TONSILLECTOMY      TOTAL KNEE ARTHROPLASTY      TOTAL SHOULDER ARTHROPLASTY       Time Tracking:     PT Received On: 01/24/18  PT Start Time: 1038     PT Stop Time: 1053  PT Total Time (min): 15 min     Billable Minutes: Evaluation 15 with OT present    Genny Lindsey, PT  01/24/2018

## 2018-01-24 NOTE — MEDICAL/APP STUDENT
Progress Note  Hospital Medicine    Admit Date: 1/20/2018  Follow-up for  Diabetic ketoacidosis without coma associated with type 2 diabetes mellitus    SUBJECTIVE:     History of Present Illness:  Patient is a 77 y.o. female admitted with Diabetic ketoacidosis without coma associated with type 2 diabetes mellitus.    Interval History: Pt happy with care. Feeling well. No pain reported.      Review of Systems:  Constitutional: no fever or chills  Eyes: no visual changes  ENT: no nasal congestion or sore throat  Respiratory: no cough or shortness of breath  Cardiovascular: no chest pain or palpitations  Gastrointestinal: no nausea or vomiting, no abdominal pain or change in bowel habits  Genitourinary: no hematuria or dysuria  Integument/Breast: no rash or pruritis  Hematologic/Lymphatic: positive for easy bruising  Allergy/Immunology: sulfonamides & percuset  Musculoskeletal: positive for arthralgias and stiff joints  Neurological: no seizures or tremors  Behavioral/Psych: no auditory or visual hallucinations  Endocrine: no heat or cold intolerance      Scheduled Meds:   aspirin  325 mg Oral Daily    clopidogrel  75 mg Oral Daily    famotidine  20 mg Oral BID    metoprolol succinate  25 mg Oral BID    miconazole nitrate 2%   Topical (Top) BID    nystatin  500,000 Units Oral QID (WM & HS)    potassium bicarbonate  50 mEq Oral Once    pravastatin  40 mg Oral Daily    ramipril  2.5 mg Oral Daily     Continuous Infusions:  PRN Meds:.acetaminophen, acetaminophen, benzocaine, dextrose 10 % in water (D10W), dextrose 50%, dextrose 50%, glucagon (human recombinant), glucose, glucose, influenza, insulin aspart, ondansetron, pneumoc 13-brad conj-dip cr(PF), sodium chloride 0.9%      OBJECTIVE:     Vital Signs Range (Last 24H):  Temp:  [97.6 °F (36.4 °C)-99.9 °F (37.7 °C)] 98.3 °F (36.8 °C)  Pulse:  [64-77] 67  Resp:  [17-19] 18  SpO2:  [98 %-100 %] 98 %  BP: (112-137)/(53-62) 133/60    Physical Exam:    General:  slender body habitus, no distress  Lungs:  clear to auscultation bilaterally and normal respiratory effort  Cardiovascular: Heart: regular rate and rhythm, S1, S2 normal, no murmur, click, rub or gallop. Chest Wall: no tenderness. Extremities: no cyanosis or edema, or clubbing. Skin changes and bruising. Pulses: 1+ and symmetric.  Abdomen: soft, non-tender non-distended; bowel sounds normal; large soft bulge in LLQ,  no organomegaly.   Skin: Skin color, texture, turgor normal with exception of frequent large bruises. No rashes or lesions  Musculoskeletal:no clubbing, cyanosis, joints not swollen, warm, tender, or red  Neurologic: Normal strength and tone. No focal numbness or weakness  Psych/Behavioral:  Alert and oriented, appropriate affect.    Input/Output:  Urine x 7  Stool x 0  +600 PO  +200 PB  Laboratory:  Recent Results (from the past 24 hour(s))   POCT glucose    Collection Time: 01/23/18 12:24 PM   Result Value Ref Range    POCT Glucose 105 70 - 110 mg/dL   POCT glucose    Collection Time: 01/23/18  8:23 PM   Result Value Ref Range    POCT Glucose 134 (H) 70 - 110 mg/dL   Comprehensive metabolic panel    Collection Time: 01/24/18  6:53 AM   Result Value Ref Range    Sodium 135 (L) 136 - 145 mmol/L    Potassium 3.1 (L) 3.5 - 5.1 mmol/L    Chloride 106 95 - 110 mmol/L    CO2 22 (L) 23 - 29 mmol/L    Glucose 115 (H) 70 - 110 mg/dL    BUN, Bld 6 (L) 8 - 23 mg/dL    Creatinine 0.7 0.5 - 1.4 mg/dL    Calcium 8.2 (L) 8.7 - 10.5 mg/dL    Total Protein 4.8 (L) 6.0 - 8.4 g/dL    Albumin 1.8 (L) 3.5 - 5.2 g/dL    Total Bilirubin 0.4 0.1 - 1.0 mg/dL    Alkaline Phosphatase 64 55 - 135 U/L     (H) 10 - 40 U/L     (H) 10 - 44 U/L    Anion Gap 7 (L) 8 - 16 mmol/L    eGFR if African American >60 >60 mL/min/1.73 m^2    eGFR if non African American >60 >60 mL/min/1.73 m^2   CBC auto differential    Collection Time: 01/24/18  6:55 AM   Result Value Ref Range    WBC 4.16 3.90 - 12.70 K/uL    RBC 2.50 (L) 4.00 -  5.40 M/uL    Hemoglobin 7.5 (L) 12.0 - 16.0 g/dL    Hematocrit 21.7 (L) 37.0 - 48.5 %    MCV 87 82 - 98 fL    MCH 30.0 27.0 - 31.0 pg    MCHC 34.6 32.0 - 36.0 g/dL    RDW 15.3 (H) 11.5 - 14.5 %    Platelets 135 (L) 150 - 350 K/uL    MPV 12.0 9.2 - 12.9 fL    Gran # 0.9 (L) 1.8 - 7.7 K/uL    Lymph # 1.1 1.0 - 4.8 K/uL    Mono # 1.9 (H) 0.3 - 1.0 K/uL    Eos # 0.3 0.0 - 0.5 K/uL    Baso # 0.01 0.00 - 0.20 K/uL    Gran% 23.1 (L) 38.0 - 73.0 %    Lymph% 26.2 18.0 - 48.0 %    Mono% 45.0 (H) 4.0 - 15.0 %    Eosinophil% 7.2 0.0 - 8.0 %    Basophil% 0.2 0.0 - 1.9 %    Platelet Estimate Appears normal     Aniso Moderate     Poly Occasional     Hypo Occasional     Differential Method Automated    POCT glucose    Collection Time: 01/24/18  8:36 AM   Result Value Ref Range    POCT Glucose 134 (H) 70 - 110 mg/dL       Diagnostic Results:  Labs: Reviewed       ASSESSMENT/PLAN:     Active Hospital Problems    Diagnosis Resolved? Plan of Action    *Diabetic ketoacidosis without coma associated with type 2 diabetes mellitus [E11.10] Resolved and on sliding scale insulin.  Monitor POCT Glucose    Pancytopenia [D61.818] Improving Hold MTX and HCQ    Neutropenia [D70.9] " "    Oral thrush [B37.0] Patient not complaining of symptoms Continue Nystatin QID, miconazol nitrate BID, and benzocaine 20% QID    Vaginal candidiasis [B37.3] " "    Acquired immunocompromised state [D84.9]  Hold MTX and HCQ    Noncompliance with medication regimen [Z91.14]  Educate    AICD (automatic cardioverter/defibrillator) present [Z95.810] stable Yes    CAD (coronary artery disease) [I25.10] stable Cont Home Meds    Essential hypertension [I10] stable BP in acceptable range, cont to hold home meds for HTN    Rheumatoid arthritis [M06.9] stable Cont to hold MTX and HCQ

## 2018-01-25 LAB
ALBUMIN SERPL BCP-MCNC: 1.9 G/DL
ALP SERPL-CCNC: 75 U/L
ALT SERPL W/O P-5'-P-CCNC: 96 U/L
ANION GAP SERPL CALC-SCNC: 8 MMOL/L
ANISOCYTOSIS BLD QL SMEAR: SLIGHT
AST SERPL-CCNC: 90 U/L
BASOPHILS # BLD AUTO: ABNORMAL K/UL
BASOPHILS NFR BLD: 1 %
BILIRUB SERPL-MCNC: 0.4 MG/DL
BUN SERPL-MCNC: 4 MG/DL
CALCIUM SERPL-MCNC: 8.4 MG/DL
CHLORIDE SERPL-SCNC: 103 MMOL/L
CO2 SERPL-SCNC: 24 MMOL/L
CREAT SERPL-MCNC: 0.7 MG/DL
DIFFERENTIAL METHOD: ABNORMAL
EOSINOPHIL # BLD AUTO: ABNORMAL K/UL
EOSINOPHIL NFR BLD: 6 %
ERYTHROCYTE [DISTWIDTH] IN BLOOD BY AUTOMATED COUNT: 15.5 %
EST. GFR  (AFRICAN AMERICAN): >60 ML/MIN/1.73 M^2
EST. GFR  (NON AFRICAN AMERICAN): >60 ML/MIN/1.73 M^2
GIANT PLATELETS BLD QL SMEAR: PRESENT
GLUCOSE SERPL-MCNC: 124 MG/DL
HCT VFR BLD AUTO: 24.6 %
HGB BLD-MCNC: 8.5 G/DL
HYPOCHROMIA BLD QL SMEAR: ABNORMAL
LYMPHOCYTES # BLD AUTO: ABNORMAL K/UL
LYMPHOCYTES NFR BLD: 16 %
MCH RBC QN AUTO: 30.5 PG
MCHC RBC AUTO-ENTMCNC: 34.6 G/DL
MCV RBC AUTO: 88 FL
METAMYELOCYTES NFR BLD MANUAL: 1 %
METHYLMALONATE SERPL-SCNC: 0.3 UMOL/L
MONOCYTES # BLD AUTO: ABNORMAL K/UL
MONOCYTES NFR BLD: 37 %
MYELOCYTES NFR BLD MANUAL: 4 %
NEUTROPHILS NFR BLD: 24 %
NEUTS BAND NFR BLD MANUAL: 10 %
PLATELET # BLD AUTO: 282 K/UL
PLATELET BLD QL SMEAR: ABNORMAL
PMV BLD AUTO: 11.3 FL
POCT GLUCOSE: 111 MG/DL (ref 70–110)
POCT GLUCOSE: 112 MG/DL (ref 70–110)
POCT GLUCOSE: 121 MG/DL (ref 70–110)
POCT GLUCOSE: 298 MG/DL (ref 70–110)
POCT GLUCOSE: 50 MG/DL (ref 70–110)
POIKILOCYTOSIS BLD QL SMEAR: SLIGHT
POLYCHROMASIA BLD QL SMEAR: ABNORMAL
POTASSIUM SERPL-SCNC: 3.6 MMOL/L
PROMYELOCYTES NFR BLD MANUAL: 1 %
PROT SERPL-MCNC: 5.1 G/DL
RBC # BLD AUTO: 2.79 M/UL
SODIUM SERPL-SCNC: 135 MMOL/L
WBC # BLD AUTO: 8.77 K/UL

## 2018-01-25 PROCEDURE — 25000003 PHARM REV CODE 250: Performed by: INTERNAL MEDICINE

## 2018-01-25 PROCEDURE — 86580 TB INTRADERMAL TEST: CPT | Performed by: HOSPITALIST

## 2018-01-25 PROCEDURE — 97535 SELF CARE MNGMENT TRAINING: CPT

## 2018-01-25 PROCEDURE — G8987 SELF CARE CURRENT STATUS: HCPCS | Mod: CJ

## 2018-01-25 PROCEDURE — 25000003 PHARM REV CODE 250: Performed by: NURSE PRACTITIONER

## 2018-01-25 PROCEDURE — 36415 COLL VENOUS BLD VENIPUNCTURE: CPT

## 2018-01-25 PROCEDURE — G8989 SELF CARE D/C STATUS: HCPCS | Mod: CJ

## 2018-01-25 PROCEDURE — G8988 SELF CARE GOAL STATUS: HCPCS | Mod: CI

## 2018-01-25 PROCEDURE — 99232 SBSQ HOSP IP/OBS MODERATE 35: CPT | Mod: ,,, | Performed by: INTERNAL MEDICINE

## 2018-01-25 PROCEDURE — 80053 COMPREHEN METABOLIC PANEL: CPT

## 2018-01-25 PROCEDURE — 97116 GAIT TRAINING THERAPY: CPT

## 2018-01-25 PROCEDURE — 85007 BL SMEAR W/DIFF WBC COUNT: CPT

## 2018-01-25 PROCEDURE — 63600175 PHARM REV CODE 636 W HCPCS: Performed by: HOSPITALIST

## 2018-01-25 PROCEDURE — 97530 THERAPEUTIC ACTIVITIES: CPT

## 2018-01-25 PROCEDURE — 11000001 HC ACUTE MED/SURG PRIVATE ROOM

## 2018-01-25 PROCEDURE — 85027 COMPLETE CBC AUTOMATED: CPT

## 2018-01-25 PROCEDURE — 25000003 PHARM REV CODE 250: Performed by: HOSPITALIST

## 2018-01-25 RX ADMIN — CLOPIDOGREL BISULFATE 75 MG: 75 TABLET ORAL at 08:01

## 2018-01-25 RX ADMIN — NYSTATIN 500000 UNITS: 100000 SUSPENSION ORAL at 06:01

## 2018-01-25 RX ADMIN — Medication 5 UNITS: at 06:01

## 2018-01-25 RX ADMIN — ASPIRIN 325 MG: 325 TABLET, DELAYED RELEASE ORAL at 08:01

## 2018-01-25 RX ADMIN — NYSTATIN 500000 UNITS: 100000 SUSPENSION ORAL at 01:01

## 2018-01-25 RX ADMIN — RAMIPRIL 2.5 MG: 2.5 CAPSULE ORAL at 08:01

## 2018-01-25 RX ADMIN — METOPROLOL SUCCINATE 25 MG: 25 TABLET, EXTENDED RELEASE ORAL at 08:01

## 2018-01-25 RX ADMIN — INSULIN ASPART 6 UNITS: 100 INJECTION, SOLUTION INTRAVENOUS; SUBCUTANEOUS at 01:01

## 2018-01-25 RX ADMIN — NYSTATIN 500000 UNITS: 100000 SUSPENSION ORAL at 08:01

## 2018-01-25 RX ADMIN — MICONAZOLE NITRATE: 20 OINTMENT TOPICAL at 08:01

## 2018-01-25 RX ADMIN — DEXTROSE MONOHYDRATE 25 G: 25 INJECTION, SOLUTION INTRAVENOUS at 04:01

## 2018-01-25 RX ADMIN — FAMOTIDINE 20 MG: 20 TABLET, FILM COATED ORAL at 08:01

## 2018-01-25 RX ADMIN — PRAVASTATIN SODIUM 40 MG: 40 TABLET ORAL at 08:01

## 2018-01-25 NOTE — SUBJECTIVE & OBJECTIVE
Interval History: Pt more alert, Pt ambulating in room with assistance of PT /walker.     Oncology Treatment Plan:   [No treatment plan]    Medications:  Continuous Infusions:  Scheduled Meds:   aspirin  325 mg Oral Daily    clopidogrel  75 mg Oral Daily    famotidine  20 mg Oral BID    metoprolol succinate  25 mg Oral BID    miconazole nitrate 2%   Topical (Top) BID    nystatin  500,000 Units Oral QID (WM & HS)    pravastatin  40 mg Oral Daily    ramipril  2.5 mg Oral Daily    tuberculin  5 Units Intradermal Once     PRN Meds:acetaminophen, acetaminophen, benzocaine, dextrose 10 % in water (D10W), dextrose 50%, dextrose 50%, glucagon (human recombinant), glucose, glucose, influenza, insulin aspart, ondansetron, pneumoc 13-brad conj-dip cr(PF), sodium chloride 0.9%     Review of Systems   Constitutional: Positive for appetite change and fatigue. Negative for fever and unexpected weight change.   HENT: Positive for mouth sores (improved).    Eyes: Negative for visual disturbance.   Respiratory: Negative for cough and shortness of breath.    Cardiovascular: Negative for chest pain.   Gastrointestinal: Negative for abdominal pain and diarrhea.   Genitourinary: Negative for frequency.   Musculoskeletal: Negative for back pain.   Skin: Negative for rash.   Neurological: Negative for weakness and headaches.   Hematological: Negative for adenopathy.   Psychiatric/Behavioral: The patient is not nervous/anxious.      Objective:     Vital Signs (Most Recent):  Temp: 98.3 °F (36.8 °C) (01/25/18 1235)  Pulse: 70 (01/25/18 1235)  Resp: 18 (01/25/18 1235)  BP: (!) 109/56 (01/25/18 1235)  SpO2: 97 % (01/25/18 1235) Vital Signs (24h Range):  Temp:  [97.2 °F (36.2 °C)-98.5 °F (36.9 °C)] 98.3 °F (36.8 °C)  Pulse:  [68-78] 70  Resp:  [17-18] 18  SpO2:  [96 %-99 %] 97 %  BP: (103-133)/(49-73) 109/56     Weight: 54.7 kg (120 lb 9.5 oz)  Body mass index is 19.46 kg/m².  Body surface area is 1.6 meters squared.      Intake/Output  Summary (Last 24 hours) at 01/25/18 1604  Last data filed at 01/25/18 0600   Gross per 24 hour   Intake              120 ml   Output              240 ml   Net             -120 ml       Physical Exam   Constitutional: She appears well-developed.   chronically-ill appearing,frail   Eyes: Conjunctivae are normal.   Neck: Normal range of motion. Neck supple.   Cardiovascular: Normal rate, regular rhythm and normal heart sounds.    No murmur heard.  Pulmonary/Chest: Effort normal and breath sounds normal.   Abdominal: Soft. Bowel sounds are normal.   Musculoskeletal:   ambualates w/assistance of walker   Neurological: She is alert.   Skin: Skin is warm and dry. No ecchymosis and no petechiae noted.       Significant Labs:   All pertinent labs within the past 24 hours have been reviewed    Diagnostic Results:  I have reviewed and interpreted all pertinent imaging results/findings within the past 24 hours

## 2018-01-25 NOTE — DISCHARGE SUMMARY
[facility] Hospital Medicine  Discharge Summary      Patient Name: Ruthann Doe  MRN: [id number]  Admission Date: 1/20/2018  Hospital Length of Stay: 9 days  Discharge Date and Time: 1.29.18   Attending Physician: Michel Woody MD   Discharging Provider: Michel Woody MD  Primary Care Provider: Jesse Partida MD      HPI:   Ms. Doe is a 78 yo woman with NIDDM (A1c 10.2%), CKD3, CAD, and RA who presented for painful mouth lesions. She also complains of vaginal irritation. She was found to be neutropenic and thrombocytopenic. She has been non-compliant with her home regimen of glimepiride. She was found to be in DKA and admitted to the ICU on an insulin infusion. Please see H&P for full detail.     * No surgery found *      Hospital Course:   Ms. Doe is a 78 yo woman with NIDDM (A1c 10.2%), CKD3, CAD, and RA who presented for painful mouth lesions. She also complains of vaginal irritation. She was found to be neutropenic and thrombocytopenic. She has been non-compliant with her home regimen of glimepiride. She was found to be in DKA and admitted to the ICU on an insulin infusion.  She was given IV fluids and started on insulin drip and her DKA quickly resolved.she was on prednisone at home for RA, She was started on basal/bolus insulin. Her hemoglobin A1c was 10.2%.she was transferred to floor and  remains stable on SSI,DKA likely duo to use of Prednisone,did not require insulin at DC time and will continue with home medication Amaryl.  Hematology and ID were consulted for pancytopenia. She was afebrile. She had mucositis. She had methotrexate toxicity and was started on leucovorin (folinic acid). MTX level not elevated.MTX was hold , BCx NGTD. UCx from 1/20 with ESBL E coli but <100k cfu. Repeat UCx NGTD.ID started on empiric  Zosyn until ANC improves.ANC and pancytopenia is resolved,  Consulted PT,OT .recommneded  HH.  Her mouth mucositis much  improved.  Patient does not require MTX and plaquenil at DC time,recommended prn Tylenol,she was asymptomatic at DC time.  She has been discharged home with  and follow up with PCP in next few days.         Consults:   Consults         Status Ordering Provider     Infectious Diseases  Once     Provider:  Danielle Fernandez MD    Completed MERLY JOSE     Inpatient consult to Hematology  Once     Provider:  Ashley Covington MD    Acknowledged MERLY JOSE          No new Assessment & Plan notes have been filed under this hospital service since the last note was generated.  Service: Hospital Medicine    Final Active Diagnoses:    Diagnosis Date Noted POA    PRINCIPAL PROBLEM:  Diabetic ketoacidosis without coma associated with type 2 diabetes mellitus [E11.10] 01/20/2018 Yes    Pancytopenia [D61.818] 01/21/2018 Yes    Neutropenia [D70.9] 01/21/2018 Yes    Oral thrush [B37.0] 01/21/2018 Yes    Vaginal candidiasis [B37.3] 01/21/2018 Yes    Acquired immunocompromised state [D84.9] 01/21/2018 Yes    Noncompliance with medication regimen [Z91.14] 01/21/2018 Not Applicable    AICD (automatic cardioverter/defibrillator) present [Z95.810] 05/24/2013 Yes    CAD (coronary artery disease) [I25.10] 10/01/2012 Yes    Essential hypertension [I10] 10/01/2012 Yes    Rheumatoid arthritis [M06.9] 10/01/2012 Yes      Problems Resolved During this Admission:    Diagnosis Date Noted Date Resolved POA       Discharged Condition: stable    [place] [facility]    Follow Up:  Follow-up Information     Schedule an appointment as soon as possible for a visit  with Jesse Partida MD.    Specialty:  Internal Medicine  Contact information:  [address] [facility] [address]  [place] [id number] [phone]             Jesse Partida MD In 1 week.    Specialty:  Internal Medicine  Contact information:  [address] [facility] [address]  [facility] [address] [phone]                 Patient Instructions:      Diet diabetic     Activity as tolerated         Significant Diagnostic Studies: Labs:   BMP:   Recent Labs  Lab 01/24/18  0653 01/25/18  0641   * 124*   * 135*   K 3.1* 3.6    103   CO2 22* 24   BUN 6* 4*   CREATININE 0.7 0.7   CALCIUM 8.2* 8.4*    and CBC   Recent Labs  Lab 01/24/18  0655 01/25/18  0641   WBC 4.16 8.77   HGB 7.5* 8.5*   HCT 21.7* 24.6*   * 282     Microbiology:   Blood Culture   Lab Results   Component Value Date    LABBLOO No Growth to date 01/21/2018    LABBLOO No Growth to date 01/21/2018    LABBLOO No Growth to date 01/21/2018    LABBLOO No Growth to date 01/21/2018    and Urine Culture    Lab Results   Component Value Date    LABURIN No growth 01/21/2018         Pending Diagnostic Studies:     None         Medications:  Reconciled Home Medications:   Current Discharge Medication List      START taking these medications    Details   fluconazole (DIFLUCAN) 150 MG Tab Take 1 tablet (150 mg total) by mouth every 72 hours.  Qty: 3 tablet, Refills: 0      lidocaine HCl 2% (LIDOCAINE VISCOUS) 2 % Soln 15ml every 3h as needed for oral pain  Qty: 100 mL, Refills: 0      nystatin (MYCOSTATIN) 100,000 unit/mL suspension Take 5 mLs (500,000 Units total) by mouth 4 (four) times daily. Continue for 2 days after end of symptoms  Qty: 200 mL, Refills: 0         CONTINUE these medications which have NOT CHANGED    Details   aspirin (ECOTRIN) 325 MG EC tablet Take 325 mg by mouth once daily.        clopidogrel (PLAVIX) 75 mg tablet Take 1 tablet (75 mg total) by mouth once daily.  Qty: 90 tablet, Refills: 3      glimepiride (AMARYL) 1 MG tablet       metoprolol succinate (TOPROL-XL) 25 MG 24 hr tablet TAKE 1 TABLET BY MOUTH TWICE DAILY  Qty: 60 tablet, Refills: 0      multivitamin capsule Take 1 capsule by mouth once daily.        pravastatin (PRAVACHOL) 40 MG tablet Refills: 11      ramipril (ALTACE) 2.5 MG capsule TAKE ONE CAPSULE BY MOUTH EVERY DAY  Qty: 90 capsule, Refills: 1       tizanidine (ZANAFLEX) 2 MG tablet Take 2 tablets (4 mg total) by mouth every 6 (six) hours as needed.  Qty: 40 tablet, Refills: 2      vitamin D 185 MG Tab Take 1,000 mg by mouth once daily.           STOP taking these medications       hydrocodone-acetaminophen 5-325mg (NORCO) 5-325 mg per tablet Comments:   Reason for Stopping:         hydroxychloroquine (PLAQUENIL) 200 mg tablet Comments:   Reason for Stopping:         methotrexate 2.5 MG Tab Comments:   Reason for Stopping:         predniSONE (DELTASONE) 5 MG tablet Comments:   Reason for Stopping:               Indwelling Lines/Drains at time of discharge:   Lines/Drains/Airways          No matching active lines, drains, or airways          Time spent on the discharge of patient: 30  minutes  Patient was seen and examined on the date of discharge and determined to be suitable for discharge.         Michel Woody MD  Department of Hospital Medicine  [facility]

## 2018-01-25 NOTE — PLAN OF CARE
Problem: Occupational Therapy Goal  Goal: Occupational Therapy Goal  Goals to be met by: 2/7/2018    Patient will increase functional independence with ADLs by performing:    UE Dressing with Stand-by Assistance.  LE Dressing with Stand-by Assistance.  Grooming while standing at sink with Stand-by Assistance.  Toileting from toilet with Stand-by Assistance for hygiene and clothing management.   Supine to sit with Supervision.  Stand pivot transfers with Stand-by Assistance.  Toilet transfer to toilet with Stand-by Assistance.  Upper extremity exercise program x15 reps per handout, with assistance as needed.     Outcome: Ongoing (interventions implemented as appropriate)  The patient agreed to participate in OT but was limited by c/o pain. The patient was able to amb using a RW to the toilet and bed with CGA. Patient will benefit from OT to address functional deficits.

## 2018-01-25 NOTE — PLAN OF CARE
[facility]    HOME HEALTH ORDERS  FACE TO FACE ENCOUNTER    Patient Name: Ruthann Doe  YOB: 1940    PCP: Jesse Partida MD   PCP Address: [address] / [place]  PCP Phone Number: [phone]  PCP Fax: [phone]    Encounter Date: 01/25/2018    Admit to Home Health    Diagnoses:  Active Hospital Problems    Diagnosis  POA    *Diabetic ketoacidosis without coma associated with type 2 diabetes mellitus [E11.10]  Yes    Pancytopenia [D61.818]  Yes    Neutropenia [D70.9]  Yes    Oral thrush [B37.0]  Yes    Vaginal candidiasis [B37.3]  Yes    Acquired immunocompromised state [D84.9]  Yes    Noncompliance with medication regimen [Z91.14]  Not Applicable    AICD (automatic cardioverter/defibrillator) present [Z95.810]  Yes    CAD (coronary artery disease) [I25.10]  Yes    Essential hypertension [I10]  Yes    Rheumatoid arthritis [M06.9]  Yes      Resolved Hospital Problems    Diagnosis Date Resolved POA   No resolved problems to display.       No future appointments.  Follow-up Information     Schedule an appointment as soon as possible for a visit  with Jesse Partida MD.    Specialty:  Internal Medicine  Contact information:  [address]  [place] [facility]  [phone]             Jesse Partida MD In 1 week.    Specialty:  Internal Medicine  Contact information:  [address]  [facility] [address]  [place] [address]  [phone]                     I have seen and examined this patient face to face today. My clinical findings that support the need for the home health skilled services and home bound status are the following:  Weakness/numbness causing balance and gait disturbance due to Infection, Weakness/Debility and Anemia making it taxing to leave home.    Allergies:  Review of patient's allergies indicates:   Allergen Reactions    Sulfa (sulfonamide antibiotics) Hives    Percocet [oxycodone-acetaminophen]  Nausea And Vomiting       Diet: diabetic diet: 2000 calorie    Activities: activity as tolerated    Nursing:   SN to complete comprehensive assessment including routine vital signs. Instruct on disease process and s/s of complications to report to MD. Review/verify medication list sent home with the patient at time of discharge  and instruct patient/caregiver as needed. Frequency may be adjusted depending on start of care date.    Notify MD if SBP > 160 or < 90; DBP > 90 or < 50; HR > 120 or < 50; Temp > 101; Other:         CONSULTS:    Physical Therapy to evaluate and treat. Evaluate for home safety and equipment needs; Establish/upgrade home exercise program. Perform / instruct on therapeutic exercises, gait training, transfer training, and Range of Motion.  Occupational Therapy to evaluate and treat. Evaluate home environment for safety and equipment needs. Perform/Instruct on transfers, ADL training, ROM, and therapeutic exercises.    MISCELLANEOUS CARE:  Routine Skin for Bedridden Patients: Instruct patient/caregiver to apply moisture barrier cream to all skin folds and wet areas in perineal area daily and after baths and all bowel movements.    WOUND CARE ORDERS  n/a      Medications: Review discharge medications with patient and family and provide education.      Current Discharge Medication List      START taking these medications    Details   fluconazole (DIFLUCAN) 150 MG Tab Take 1 tablet (150 mg total) by mouth every 72 hours.  Qty: 3 tablet, Refills: 0      lidocaine HCl 2% (LIDOCAINE VISCOUS) 2 % Soln 15ml every 3h as needed for oral pain  Qty: 100 mL, Refills: 0      nystatin (MYCOSTATIN) 100,000 unit/mL suspension Take 5 mLs (500,000 Units total) by mouth 4 (four) times daily. Continue for 2 days after end of symptoms  Qty: 200 mL, Refills: 0         CONTINUE these medications which have NOT CHANGED    Details   aspirin (ECOTRIN) 325 MG EC tablet Take 325 mg by mouth once daily.        clopidogrel  (PLAVIX) 75 mg tablet Take 1 tablet (75 mg total) by mouth once daily.  Qty: 90 tablet, Refills: 3      glimepiride (AMARYL) 1 MG tablet       metoprolol succinate (TOPROL-XL) 25 MG 24 hr tablet TAKE 1 TABLET BY MOUTH TWICE DAILY  Qty: 60 tablet, Refills: 0      multivitamin capsule Take 1 capsule by mouth once daily.        pravastatin (PRAVACHOL) 40 MG tablet Refills: 11      ramipril (ALTACE) 2.5 MG capsule TAKE ONE CAPSULE BY MOUTH EVERY DAY  Qty: 90 capsule, Refills: 1      tizanidine (ZANAFLEX) 2 MG tablet Take 2 tablets (4 mg total) by mouth every 6 (six) hours as needed.  Qty: 40 tablet, Refills: 2      vitamin D 185 MG Tab Take 1,000 mg by mouth once daily.           STOP taking these medications       hydrocodone-acetaminophen 5-325mg (NORCO) 5-325 mg per tablet Comments:   Reason for Stopping:         hydroxychloroquine (PLAQUENIL) 200 mg tablet Comments:   Reason for Stopping:         methotrexate 2.5 MG Tab Comments:   Reason for Stopping:         predniSONE (DELTASONE) 5 MG tablet Comments:   Reason for Stopping:               I certify that this patient is confined to her home and needs intermittent skilled nursing care, physical therapy and occupational therapy.

## 2018-01-25 NOTE — PROGRESS NOTES
"Order received for HH.   Patient choice form completed, original to blue folder, copy to patient.  Pt requested N choose provider.   Facesheet, Orders, H&P, med list, consults, discharge summary, and PT & OT eval sent to N at [phone] and via [facility].  Awaiting fax confirmation and return call to confirm providers.    1245 [place] met with pt, pt's son to discuss discharge f/u appt and home health. Talked extensively at least at 3 separate entities. Son verbalized along with another "brother" and "sister-in-law" pt 's home is not conducive for living, no heat is available. Pt is not able to make decisions and is impaired cognitively. Pt's son, Tomy, stated he cannot take pt to [place] where he lives. TN explained in detail the process of the nursing placement on two separate occasions and placement would not occur today. TN will provide a list of [facility] NH's. TN conveyed to attending physician, disposition is NH.        "

## 2018-01-25 NOTE — PT/OT/SLP PROGRESS
Occupational Therapy   Treatment    Name: Ruthann Doe  MRN: [id number]  Admitting Diagnosis:  Diabetic ketoacidosis without coma associated with type 2 diabetes mellitus       Recommendations:     Discharge Recommendations: home with home health (The patient may benefit from SNF if unable to get asssist at home.)  Discharge Equipment Recommendations:  none  Barriers to discharge:  Decreased caregiver support    Subjective     Communicated with: nurseSarah prior to session.  Pain/Comfort:  · Pain Rating 1:  (yes- did not rate but cries in pain with movement or touch)  · Location 1: vagina (vaginal lesions )  · Pain Addressed 1: Nurse notified, Cessation of Activity, Reposition    Patients cultural, spiritual, Mu-ism conflicts given the current situation: no    Objective:     Patient found with: peripheral IV    General Precautions: Standard, fall   Orthopedic Precautions:N/A   Braces: N/A     Occupational Performance:    Bed Mobility:    · Patient completed Rolling/Turning to Right with modified independence  · Patient completed Scooting/Bridging with stand by assistance  · Patient completed Sit to Supine with stand by assistance     Functional Mobility/Transfers:  · Patient completed Sit <> Stand Transfer with contact guard assistance and VC  with  rolling walker   · Patient completed Toilet Transfer Stand Pivot technique with contact guard assistance with  rolling walker  · Functional Mobility: The patient is able to amb using a RW from the chair to toilet with CGA using a RW. The patient pushed the RW away and amb to the sink and bed with CGA.    Activities of Daily Living:  · Grooming: The patient was able to stand at the sink with SBA to wash her face and hands  · UB Dressing: contact guard assistance to don/doff gown while seated in the chair  · Toileting: total assistance to wipe and apply cream. The patient was able to manage her underpants with CGA.    Patient left right sidelying with all lines  intact, call button in reach and nurseSarah notified    [facility] 6 Click:  [facility] Total Score: 18    Treatment & Education:  The patient agreed to participate in OT but c/o severe vaginal pain with movement.    Education:    Assessment:     Ruthann Doe is a 77 y.o. female with a medical diagnosis of Diabetic ketoacidosis without coma associated with type 2 diabetes mellitus.  The patient's functional mobility and self care is limited by c/o pain.   Performance deficits affecting function are weakness, impaired endurance, impaired self care skills, impaired balance, gait instability, impaired functional mobilty, decreased safety awareness, pain, impaired skin.      Rehab Prognosis:  good; patient would benefit from acute skilled OT services to address these deficits and reach maximum level of function.       Plan:     Patient to be seen 3 x/week to address the above listed problems via self-care/home management, therapeutic exercises, therapeutic activities  · Plan of Care Expires: 02/07/18  · Plan of Care Reviewed with: patient    This Plan of care has been discussed with the patient who was involved in its development and understands and is in agreement with the identified goals and treatment plan    GOALS:    Occupational Therapy Goals        Problem: Occupational Therapy Goal    Goal Priority Disciplines Outcome Interventions   Occupational Therapy Goal     OT, PT/OT Ongoing (interventions implemented as appropriate)    Description:  Goals to be met by: 2/7/2018    Patient will increase functional independence with ADLs by performing:    UE Dressing with Stand-by Assistance.  LE Dressing with Stand-by Assistance.  Grooming while standing at sink with Stand-by Assistance.  Toileting from toilet with Stand-by Assistance for hygiene and clothing management.   Supine to sit with Supervision.  Stand pivot transfers with Stand-by Assistance.  Toilet transfer to toilet with Stand-by Assistance.  Upper extremity  exercise program x15 reps per handout, with assistance as needed.                      Time Tracking:     OT Date of Treatment: 01/25/18  OT Start Time: 1153  OT Stop Time: 1218  OT Total Time (min): 25 min    Billable Minutes:Self Care/Home Management 25 Jandarya Juarez OT  1/25/2018

## 2018-01-25 NOTE — PT/OT/SLP PROGRESS
"Physical Therapy Treatment    Patient Name:  Ruthann Doe   MRN:  [id number]    Recommendations:     Discharge Recommendations:  home health PT (with family assist)   Discharge Equipment Recommendations: none   Barriers to discharge: Decreased caregiver support (lives alone)    Assessment:     Ruthann Doe is a 77 y.o. female admitted with a medical diagnosis of Diabetic ketoacidosis without coma associated with type 2 diabetes mellitus.  She presents with the following impairments/functional limitations:  weakness, impaired endurance, impaired self care skills, impaired skin, pain, impaired functional mobilty, decreased safety awareness, gait instability, impaired balance.  Pt declined hallway ambulation today, despite increased encouragement.  Pt ambulated around bed to BSchair and tolerated static standing with no AD, CGA for donning of panty holes due to complaints of lesion pain to vaginal region.    Rehab Prognosis:  good; patient would benefit from acute skilled PT services to address these deficits and reach maximum level of function.      Recent Surgery: * No surgery found *      Plan:     During this hospitalization, patient to be seen 6 x/week to address the above listed problems via gait training, therapeutic activities, therapeutic exercises  · Plan of Care Expires:  02/07/18   Plan of Care Reviewed with: patient    Subjective     Communicated with pt's nurse, Sarah, prior to session.  Patient found supine in bed upon PT entry to room, agreeable to treatment.      Chief Complaint: not wanting to get out of bed  Patient comments/goals: Pt states " I am just so tired."  Pain/Comfort:  · Pain Rating 1:  (yes, did not rate)  · Location 1:  (lesions to vaginal region )  · Pain Addressed 1: Nurse notified, Cessation of Activity, Distraction    Patients cultural, spiritual, Muslim conflicts given the current situation:      Objective:     Patient found with: peripheral IV     General " Precautions: Standard, fall, neutropenic   Orthopedic Precautions:N/A   Braces: N/A     Functional Mobility:  · Bed Mobility:     · Scooting: contact guard assistance  · Supine to Sit: minimum assistance (performed x2 trials secondary to pt returning self to bed and not wanting to get up).  · Sit to Supine: CGA  · Transfers: x1 from EOB, x1 from BSchair    · Sit to Stand:  contact guard assistance with no AD  · Gait: Pt ambualted ~14ft with L HHA around bed to BSchair.  Pt declined further ambulation despite encouragement.  · Balance: pt requires SBA sitting EOB and CGA for static and dynamic standing      AM-PAC 6 CLICK MOBILITY  Turning over in bed (including adjusting bedclothes, sheets and blankets)?: 4  Sitting down on and standing up from a chair with arms (e.g., wheelchair, bedside commode, etc.): 3  Moving from lying on back to sitting on the side of the bed?: 3  Moving to and from a bed to a chair (including a wheelchair)?: 3  Need to walk in hospital room?: 3  Climbing 3-5 steps with a railing?: 2  Total Score: 18       Therapeutic Activities and Exercises:   Pt performed BM, transfers and ambulation.  Supine>sit performed twice 2/2 pt not wanting to get up.  Required strong encouragement for participation.  Pt tolerated static standing with no AD, CGA for donning of panty holes 2/2 lesions hurting.    Patient left reclined in BSchair with pressure relief cushion with all lines intact, call button in reach, pt's nurse, Sarah, notified.    GOALS:    Physical Therapy Goals        Problem: Physical Therapy Goal    Goal Priority Disciplines Outcome Goal Variances Interventions   Physical Therapy Goal     PT/OT, PT      Description:  Goals to be met by: 18    Patient will increase functional independence with mobility by performin. Supine to sit with supervision  2. Sit to stand transfer with supervision  3. Gait  x 250 feet with Supervision using rolling walker if needed  4. Lower extremity exercise  program x30 reps per handout, with supervision                      Time Tracking:     PT Received On: 01/25/18  PT Start Time: 1007     PT Stop Time: 1030  PT Total Time (min): 23 min     Billable Minutes: Gait Training 10 and Therapeutic Activity 13    Treatment Type: Treatment  PT/PTA: PTA     PTA Visit Number: 1     Fern Wheatley, PTA  01/25/2018

## 2018-01-25 NOTE — PLAN OF CARE
Problem: Patient Care Overview  Goal: Plan of Care Review  Outcome: Ongoing (interventions implemented as appropriate)  Patient remained free from falls and injury this. Gait unsteady, ambulates to restroom with one person assist. Patient complains of pain to tongue and throat, nystatin suspension administered as ordered and benzocaine spray PRN. Vaginal ulcers and redness improving with cream. Patient needs frequent re-orientation to where she is. Patient pleasant and easily redirectable, uses call light. Plan of care continued.

## 2018-01-25 NOTE — PROGRESS NOTES
Follow-up Information     Jesse Partida MD In 1 week.    Specialty:  Internal Medicine  Why:  Outpatient Services PCP Follow-Up Clinic will call lsited number and son for the appt  Contact information:  [address]  [address]  [place] [id number] [phone]             [facility].    Specialties:  Home Health Services, Physical Therapy, Occupational Therapy  Why:  Home Health Agency will contact listed number and pt's son  Contact information:  [address]  [address]  [facility] [id number] [phone]               PLEASE BRING TO ALL FOLLOW UP APPOINTMENTS:   A COPY YOUR DISCHARGE INSTRUCTIONS, Any new MEDICINES YOU ARE CURRENTLY TAKING IN THEIR ORIGINAL BOTTLES  And IDENTIFICATION AND INSURANCE CARD     **PLEASE ARRIVE 15 MINUTES AHEAD OF SCHEDULED APPOINTMENT TIME   ++PLEASE CALL 24 HOURS IN ADVANCE IF YOU MUST RESCHEDULE YOUR APPOINTMENT DAY AND/OR TIME     [facility]    WRITTEN HEALTHCARE AND DISCHARGE INFORMATION                        Help at Home           [phone]  After discharge for assistance [facility] On Call Nurse Care Line 24/7  Assistance    Things You are responsible For To Manage Your Care At Home:  1.    Getting your prescriptions filled   2.    Taking your medications as directed, DO NOT MISS ANY DOSES!  3.    Going to your follow-up doctor appointment. This is important because it  allow the doctor to monitor your progress and determine if  any changes need to made to your treatment plan.     Thank you for choosing [facility] for your care.  Please answer any calls you may receive from [facility] we want to continue to support you as you manage your healthcare needs. [facility] is happy to have the opportunity to serve you.     Sincerely,  Your [facility] Team,  SHANTELL Barnes; [phone]

## 2018-01-25 NOTE — PROGRESS NOTES
[facility]  Hematology/Oncology  Progress Note    Patient Name: Ruthann Doe  Admission Date: 1/20/2018  Hospital Length of Stay: 5 days  Code Status: Full Code     Subjective:     HPI:  Pt is a 77 y/o female with pmhx of severe RA , CAD, DMType 2, presents to ED with c/o painful mouth and vaginal  ulcers and generalized weakness past week. Pt is a poor historian. She is followed by Rheumatology at an outside facility. She is taking MTX and plaquenil. She reports she ran out of daily folate supplementation. She reports episodes of hematuria. No dysuria. Pt with febrile episode with temp 101. She is being treated with diflucan.No SOB/CP.CBC reveals wbc .55  Hb 10. 2g/dl Hct 28.7% plt ct 32k. . No N/V. Family members ( form out of town)  at bedside. Pt lives alone.     Interval History: Pt more alert, Pt ambulating in room with assistance of PT /walker.     Oncology Treatment Plan:   [No treatment plan]    Medications:  Continuous Infusions:  Scheduled Meds:   aspirin  325 mg Oral Daily    clopidogrel  75 mg Oral Daily    famotidine  20 mg Oral BID    metoprolol succinate  25 mg Oral BID    miconazole nitrate 2%   Topical (Top) BID    nystatin  500,000 Units Oral QID (WM & HS)    pravastatin  40 mg Oral Daily    ramipril  2.5 mg Oral Daily    tuberculin  5 Units Intradermal Once     PRN Meds:acetaminophen, acetaminophen, benzocaine, dextrose 10 % in water (D10W), dextrose 50%, dextrose 50%, glucagon (human recombinant), glucose, glucose, influenza, insulin aspart, ondansetron, pneumoc 13-brad conj-dip cr(PF), sodium chloride 0.9%     Review of Systems   Constitutional: Positive for appetite change and fatigue. Negative for fever and unexpected weight change.   HENT: Positive for mouth sores (improved).    Eyes: Negative for visual disturbance.   Respiratory: Negative for cough and shortness of breath.    Cardiovascular: Negative for chest pain.   Gastrointestinal: Negative  for abdominal pain and diarrhea.   Genitourinary: Negative for frequency.   Musculoskeletal: Negative for back pain.   Skin: Negative for rash.   Neurological: Negative for weakness and headaches.   Hematological: Negative for adenopathy.   Psychiatric/Behavioral: The patient is not nervous/anxious.      Objective:     Vital Signs (Most Recent):  Temp: 98.3 °F (36.8 °C) (01/25/18 1235)  Pulse: 70 (01/25/18 1235)  Resp: 18 (01/25/18 1235)  BP: (!) 109/56 (01/25/18 1235)  SpO2: 97 % (01/25/18 1235) Vital Signs (24h Range):  Temp:  [97.2 °F (36.2 °C)-98.5 °F (36.9 °C)] 98.3 °F (36.8 °C)  Pulse:  [68-78] 70  Resp:  [17-18] 18  SpO2:  [96 %-99 %] 97 %  BP: (103-133)/(49-73) 109/56     Weight: 54.7 kg (120 lb 9.5 oz)  Body mass index is 19.46 kg/m².  Body surface area is 1.6 meters squared.      Intake/Output Summary (Last 24 hours) at 01/25/18 1604  Last data filed at 01/25/18 0600   Gross per 24 hour   Intake              120 ml   Output              240 ml   Net             -120 ml       Physical Exam   Constitutional: She appears well-developed.   chronically-ill appearing,frail   Eyes: Conjunctivae are normal.   Neck: Normal range of motion. Neck supple.   Cardiovascular: Normal rate, regular rhythm and normal heart sounds.    No murmur heard.  Pulmonary/Chest: Effort normal and breath sounds normal.   Abdominal: Soft. Bowel sounds are normal.   Musculoskeletal:   ambualates w/assistance of walker   Neurological: She is alert.   Skin: Skin is warm and dry. No ecchymosis and no petechiae noted.       Significant Labs:   All pertinent labs within the past 24 hours have been reviewed    Diagnostic Results:  I have reviewed and interpreted all pertinent imaging results/findings within the past 24 hours    Assessment/Plan:     Vaginal candidiasis    Tx per primary team        Oral thrush    Tx per ID            Pancytopenia    ]drug-induced, MTX induced  HOLD MTX and Plaquenil   Transfuse Hb if <7g/dl  Transfuse plts if  <20k w/bleeding or <10k    PBS-no blasts , no morphologic abnormalities of myeloid or lymphoid   Coag parameters do not reveal evid of DIC  No evid of B12 deficiency  HIV neg    Neutropenia resolved    Hb 8.5g/dl               Rheumatoid arthritis    MTX and Plaquenil on hold  S/p Leucovorin therapy   MTX level <0.4   Hold LCV          Will sign off  Reconsult prn      Ashley Covington MD  Hematology/Oncology  [facility]

## 2018-01-25 NOTE — ASSESSMENT & PLAN NOTE
]drug-induced, MTX induced  HOLD MTX and Plaquenil   Transfuse Hb if <7g/dl  Transfuse plts if <20k w/bleeding or <10k    PBS-no blasts , no morphologic abnormalities of myeloid or lymphoid   Coag parameters do not reveal evid of DIC  No evid of B12 deficiency  HIV neg    Neutropenia resolved    Hb 8.5g/dl   Cont to monitor

## 2018-01-25 NOTE — PLAN OF CARE
Problem: Physical Therapy Goal  Goal: Physical Therapy Goal  Goals to be met by: 18    Patient will increase functional independence with mobility by performin. Supine to sit with supervision  2. Sit to stand transfer with supervision  3. Gait  x 250 feet with Supervision using rolling walker if needed  4. Lower extremity exercise program x30 reps per handout, with supervision     Outcome: Ongoing (interventions implemented as appropriate)  Pt declined hallway ambulation today, despite increased encouragement.  Pt ambulated around bed to BSchair and tolerated static standing with no AD, CGA for donning of panty holes due to complaints of lesion pain to vaginal region.

## 2018-01-26 LAB
ALBUMIN SERPL BCP-MCNC: 1.9 G/DL
ALP SERPL-CCNC: 71 U/L
ALT SERPL W/O P-5'-P-CCNC: 84 U/L
ANION GAP SERPL CALC-SCNC: 6 MMOL/L
ANISOCYTOSIS BLD QL SMEAR: SLIGHT
AST SERPL-CCNC: 71 U/L
BACTERIA BLD CULT: NORMAL
BACTERIA BLD CULT: NORMAL
BASOPHILS NFR BLD: 1 %
BILIRUB SERPL-MCNC: 0.3 MG/DL
BLASTS NFR BLD MANUAL: 1 %
BUN SERPL-MCNC: 5 MG/DL
CALCIUM SERPL-MCNC: 8.5 MG/DL
CHLORIDE SERPL-SCNC: 102 MMOL/L
CO2 SERPL-SCNC: 28 MMOL/L
CREAT SERPL-MCNC: 0.7 MG/DL
DIFFERENTIAL METHOD: ABNORMAL
EOSINOPHIL NFR BLD: 4 %
ERYTHROCYTE [DISTWIDTH] IN BLOOD BY AUTOMATED COUNT: 15.6 %
EST. GFR  (AFRICAN AMERICAN): >60 ML/MIN/1.73 M^2
EST. GFR  (NON AFRICAN AMERICAN): >60 ML/MIN/1.73 M^2
GLUCOSE SERPL-MCNC: 106 MG/DL
HCT VFR BLD AUTO: 24.1 %
HGB BLD-MCNC: 8.4 G/DL
HYPOCHROMIA BLD QL SMEAR: ABNORMAL
LYMPHOCYTES NFR BLD: 22 %
MCH RBC QN AUTO: 30.9 PG
MCHC RBC AUTO-ENTMCNC: 34.9 G/DL
MCV RBC AUTO: 89 FL
METAMYELOCYTES NFR BLD MANUAL: 3 %
MONOCYTES NFR BLD: 25 %
MYELOCYTES NFR BLD MANUAL: 3 %
NEUTROPHILS NFR BLD: 30 %
NEUTS BAND NFR BLD MANUAL: 10 %
PLATELET # BLD AUTO: 399 K/UL
PLATELET BLD QL SMEAR: ABNORMAL
PMV BLD AUTO: 11 FL
POCT GLUCOSE: 102 MG/DL (ref 70–110)
POCT GLUCOSE: 203 MG/DL (ref 70–110)
POCT GLUCOSE: 277 MG/DL (ref 70–110)
POCT GLUCOSE: 63 MG/DL (ref 70–110)
POIKILOCYTOSIS BLD QL SMEAR: SLIGHT
POLYCHROMASIA BLD QL SMEAR: ABNORMAL
POTASSIUM SERPL-SCNC: 3.4 MMOL/L
PROMYELOCYTES NFR BLD MANUAL: 1 %
PROT SERPL-MCNC: 4.9 G/DL
RBC # BLD AUTO: 2.72 M/UL
SODIUM SERPL-SCNC: 136 MMOL/L
TARGETS BLD QL SMEAR: ABNORMAL
WBC # BLD AUTO: 11.19 K/UL

## 2018-01-26 PROCEDURE — 25000003 PHARM REV CODE 250: Performed by: HOSPITALIST

## 2018-01-26 PROCEDURE — 97116 GAIT TRAINING THERAPY: CPT

## 2018-01-26 PROCEDURE — 11000001 HC ACUTE MED/SURG PRIVATE ROOM

## 2018-01-26 PROCEDURE — 25000003 PHARM REV CODE 250: Performed by: INTERNAL MEDICINE

## 2018-01-26 PROCEDURE — 36415 COLL VENOUS BLD VENIPUNCTURE: CPT

## 2018-01-26 PROCEDURE — 25000003 PHARM REV CODE 250: Performed by: NURSE PRACTITIONER

## 2018-01-26 PROCEDURE — 85027 COMPLETE CBC AUTOMATED: CPT

## 2018-01-26 PROCEDURE — 85007 BL SMEAR W/DIFF WBC COUNT: CPT

## 2018-01-26 PROCEDURE — 80053 COMPREHEN METABOLIC PANEL: CPT

## 2018-01-26 RX ADMIN — RAMIPRIL 2.5 MG: 2.5 CAPSULE ORAL at 08:01

## 2018-01-26 RX ADMIN — CLOPIDOGREL BISULFATE 75 MG: 75 TABLET ORAL at 08:01

## 2018-01-26 RX ADMIN — METOPROLOL SUCCINATE 25 MG: 25 TABLET, EXTENDED RELEASE ORAL at 08:01

## 2018-01-26 RX ADMIN — Medication 16 G: at 09:01

## 2018-01-26 RX ADMIN — MICONAZOLE NITRATE: 20 OINTMENT TOPICAL at 08:01

## 2018-01-26 RX ADMIN — FAMOTIDINE 20 MG: 20 TABLET, FILM COATED ORAL at 08:01

## 2018-01-26 RX ADMIN — MICONAZOLE NITRATE: 20 OINTMENT TOPICAL at 09:01

## 2018-01-26 RX ADMIN — ACETAMINOPHEN 650 MG: 325 TABLET ORAL at 12:01

## 2018-01-26 RX ADMIN — NYSTATIN 500000 UNITS: 100000 SUSPENSION ORAL at 08:01

## 2018-01-26 RX ADMIN — METOPROLOL SUCCINATE 25 MG: 25 TABLET, EXTENDED RELEASE ORAL at 09:01

## 2018-01-26 RX ADMIN — POTASSIUM BICARBONATE 50 MEQ: 25 TABLET, EFFERVESCENT ORAL at 08:01

## 2018-01-26 RX ADMIN — FAMOTIDINE 20 MG: 20 TABLET, FILM COATED ORAL at 09:01

## 2018-01-26 RX ADMIN — INSULIN ASPART 6 UNITS: 100 INJECTION, SOLUTION INTRAVENOUS; SUBCUTANEOUS at 05:01

## 2018-01-26 RX ADMIN — PRAVASTATIN SODIUM 40 MG: 40 TABLET ORAL at 08:01

## 2018-01-26 RX ADMIN — ASPIRIN 325 MG: 325 TABLET, DELAYED RELEASE ORAL at 08:01

## 2018-01-26 NOTE — PROGRESS NOTES
TN received a call from [facility] [place] requesting more information medically and financially. Nora will contact pt's son with explanation of process and required financial documentation mandated for acceptance. Nora will f/u with [place].    1120 [place] contacted pt's son, Tomy Ivy, at [phone] to inform tentative facilities need him to complete the financial requirements. Left a message with contact number.    1320 TN attempted to call in LOCET at [phone] option 3; was told system has been down; will be contacted; provided contact information.    1400 [place] was contacted by the state to complete the LOCET; spoke with Brittny; completed. Level I PSSR  faxed to [phone]. Awaiting [phone] to be faxed.    8253 TN received a call from Vic PALUMBO, reviewing the case. Met with pt's son. Pt choice form signed, provided copy; original placed in blue folder.

## 2018-01-26 NOTE — SUBJECTIVE & OBJECTIVE
Interval History: much more alert.    Review of Systems   Constitutional: Negative for chills and fever.   HENT: Positive for mouth sores. Negative for congestion and rhinorrhea.    Eyes: Negative for photophobia and visual disturbance.   Respiratory: Negative for cough and shortness of breath.    Cardiovascular: Negative for chest pain and leg swelling.   Gastrointestinal: Negative for abdominal pain, nausea and vomiting.   Genitourinary: Negative for vaginal pain.   Musculoskeletal: Negative for joint swelling and neck stiffness.   Skin: Negative for rash and wound.   Neurological: Negative for dizziness and light-headedness.   Psychiatric/Behavioral: Negative for agitation and behavioral problems.     Objective:     Vital Signs (Most Recent):  Temp: 98.9 °F (37.2 °C) (01/26/18 0743)  Pulse: 78 (01/26/18 0743)  Resp: 18 (01/26/18 0743)  BP: 132/61 (01/26/18 0743)  SpO2: 95 % (01/26/18 0743) Vital Signs (24h Range):  Temp:  [96.2 °F (35.7 °C)-98.9 °F (37.2 °C)] 98.9 °F (37.2 °C)  Pulse:  [69-79] 78  Resp:  [18] 18  SpO2:  [95 %-99 %] 95 %  BP: (100-139)/(49-61) 132/61     Weight: 54.7 kg (120 lb 9.5 oz)  Body mass index is 19.46 kg/m².    Intake/Output Summary (Last 24 hours) at 01/26/18 0832  Last data filed at 01/25/18 2300   Gross per 24 hour   Intake              360 ml   Output                0 ml   Net              360 ml      Physical Exam   Constitutional: She appears well-developed and well-nourished. No distress.   HENT:   Right Ear: External ear normal.   Left Ear: External ear normal.   Nose: Nose normal.   Eyes: EOM are normal. Pupils are equal, round, and reactive to light. No scleral icterus.   Neck: Normal range of motion. Neck supple. No thyromegaly present.   Cardiovascular: Normal rate and normal heart sounds.  Exam reveals no friction rub.    No murmur heard.  Pulmonary/Chest: Effort normal and breath sounds normal. No respiratory distress. She has no wheezes. She has no rales.   Abdominal:  Soft. Bowel sounds are normal. She exhibits no mass. There is no tenderness.   Musculoskeletal: Normal range of motion. She exhibits no edema or deformity.   Neurological: She is alert. No cranial nerve deficit.   Skin: Skin is warm and dry. No pallor.       Significant Labs: All pertinent labs within the past 24 hours have been reviewed.    Significant Imaging: I have reviewed and interpreted all pertinent imaging results/findings within the past 24 hours.

## 2018-01-26 NOTE — PLAN OF CARE
Problem: Fall Risk (Adult)  Goal: Absence of Falls  Patient will demonstrate the desired outcomes by discharge/transition of care.   Outcome: Ongoing (interventions implemented as appropriate)  Patient remains free of fall and injury. Up in chair on today. Still have pain from perineal area. Applying cream as needed. Repositioned as needed. Monitoring blood glucose. Sliding scale given as needed. Vital signs are stable. Instructed to call as needed.

## 2018-01-26 NOTE — PROGRESS NOTES
[facility] Medicine  Progress Note    Patient Name: Ruthann Doe  MRN: [id number]  Patient Class: IP- Inpatient   Admission Date: 1/20/2018  Length of Stay: 6 days  Attending Physician: Michel Woody MD  Primary Care Provider: Jesse Partida MD        Subjective:     Principal Problem:Diabetic ketoacidosis without coma associated with type 2 diabetes mellitus    HPI:  Ms. Doe is a 78 yo woman with NIDDM (A1c 10.2%), CKD3, CAD, and RA who presented for painful mouth lesions. She also complains of vaginal irritation. She was found to be neutropenic and thrombocytopenic. She has been non-compliant with her home regimen of glimepiride. She was found to be in DKA and admitted to the ICU on an insulin infusion. Please see H&P for full detail.     Hospital Course:  Ms. Doe is a 78 yo woman with NIDDM (A1c 10.2%), CKD3, CAD, and RA who presented for painful mouth lesions. She also complains of vaginal irritation. She was found to be neutropenic and thrombocytopenic. She has been non-compliant with her home regimen of glimepiride. She was found to be in DKA and admitted to the ICU on an insulin infusion.  She was given IV fluids and started on insulin drip and her DKA quickly resolved.she was on prednisone at home for RA, She was started on basal/bolus insulin. Her hemoglobin A1c was 10.2%.she was transferred to floor and  remains stable on SSI,DKA likely duo to use of Prednisone,did not require insulin at DC time and will continue with home medication Amaryl.  Hematology and ID were consulted for pancytopenia. She was afebrile. She had mucositis. She had methotrexate toxicity and was started on leucovorin (folinic acid). MTX level not elevated.MTX was hold , BCx NGTD. UCx from 1/20 with ESBL E coli but <100k cfu. Repeat UCx NGTD.ID started on empiric  Zosyn until ANC improves.ANC and pancytopenia is resolved,  Consulted PT,OT .recommneded  HH.  Her mouth mucositis much  improved.        Interval History: much more alert.    Review of Systems   Constitutional: Negative for chills and fever.   HENT: Positive for mouth sores. Negative for congestion and rhinorrhea.    Eyes: Negative for photophobia and visual disturbance.   Respiratory: Negative for cough and shortness of breath.    Cardiovascular: Negative for chest pain and leg swelling.   Gastrointestinal: Negative for abdominal pain, nausea and vomiting.   Genitourinary: Negative for vaginal pain.   Musculoskeletal: Negative for joint swelling and neck stiffness.   Skin: Negative for rash and wound.   Neurological: Negative for dizziness and light-headedness.   Psychiatric/Behavioral: Negative for agitation and behavioral problems.     Objective:     Vital Signs (Most Recent):  Temp: 98.9 °F (37.2 °C) (01/26/18 0743)  Pulse: 78 (01/26/18 0743)  Resp: 18 (01/26/18 0743)  BP: 132/61 (01/26/18 0743)  SpO2: 95 % (01/26/18 0743) Vital Signs (24h Range):  Temp:  [96.2 °F (35.7 °C)-98.9 °F (37.2 °C)] 98.9 °F (37.2 °C)  Pulse:  [69-79] 78  Resp:  [18] 18  SpO2:  [95 %-99 %] 95 %  BP: (100-139)/(49-61) 132/61     Weight: 54.7 kg (120 lb 9.5 oz)  Body mass index is 19.46 kg/m².    Intake/Output Summary (Last 24 hours) at 01/26/18 0832  Last data filed at 01/25/18 2300   Gross per 24 hour   Intake              360 ml   Output                0 ml   Net              360 ml      Physical Exam   Constitutional: She appears well-developed and well-nourished. No distress.   HENT:   Right Ear: External ear normal.   Left Ear: External ear normal.   Nose: Nose normal.   Eyes: EOM are normal. Pupils are equal, round, and reactive to light. No scleral icterus.   Neck: Normal range of motion. Neck supple. No thyromegaly present.   Cardiovascular: Normal rate and normal heart sounds.  Exam reveals no friction rub.    No murmur heard.  Pulmonary/Chest: Effort normal and breath sounds normal. No respiratory distress. She has no wheezes. She has no rales.    Abdominal: Soft. Bowel sounds are normal. She exhibits no mass. There is no tenderness.   Musculoskeletal: Normal range of motion. She exhibits no edema or deformity.   Neurological: She is alert. No cranial nerve deficit.   Skin: Skin is warm and dry. No pallor.       Significant Labs: All pertinent labs within the past 24 hours have been reviewed.    Significant Imaging: I have reviewed and interpreted all pertinent imaging results/findings within the past 24 hours.    Assessment/Plan:      * Diabetic ketoacidosis without coma associated with type 2 diabetes mellitus    Resolved. Transitioned to SSI.. Adjusted for hypoglycemia.          Noncompliance with medication regimen    Likely contributory to DKA.         Acquired immunocompromised state    Immunocompromised state due to treatment with methotrexate and plaquenil for rheumatoid arthritis treatment.        Vaginal candidiasis    Reports improvement with miconazole and diflucan. Candidiasis vs mucositis or both? I don't see KOH test.        Oral thrush    Symptomatic improvement with nystatin/diflucan and hurricane spray.         Neutropenia    Drug induced. Neutropenic precautions.improving.        Pancytopenia    Appreciate ID and hem recs. Hold MTX and plaquenil. Improving.        AICD (automatic cardioverter/defibrillator) present    No events. Stable.        Essential hypertension    Home metoprolol and ramipril held for hypotension.        Rheumatoid arthritis    Holding home meds        CAD (coronary artery disease)    No acute issue. Cont home meds.          VTE Risk Mitigation         Ordered     Medium Risk of VTE  Once      01/20/18 2347     Place CHANO hose  Until discontinued      01/20/18 2347     Place sequential compression device  Until discontinued      01/20/18 2347              Michel Woody MD  Department of Hospital Medicine   [facility]

## 2018-01-26 NOTE — PROGRESS NOTES
[facility] Medicine  Progress Note    Patient Name: Ruthann Doe  MRN: [id number]  Patient Class: IP- Inpatient   Admission Date: 1/20/2018  Length of Stay: 6 days  Attending Physician: Michel Woody MD  Primary Care Provider: Jesse Partida MD        Subjective:     Principal Problem:Diabetic ketoacidosis without coma associated with type 2 diabetes mellitus    HPI:  Ms. Doe is a 76 yo woman with NIDDM (A1c 10.2%), CKD3, CAD, and RA who presented for painful mouth lesions. She also complains of vaginal irritation. She was found to be neutropenic and thrombocytopenic. She has been non-compliant with her home regimen of glimepiride. She was found to be in DKA and admitted to the ICU on an insulin infusion. Please see H&P for full detail.     Hospital Course:  Ms. Doe is a 76 yo woman with NIDDM (A1c 10.2%), CKD3, CAD, and RA who presented for painful mouth lesions. She also complains of vaginal irritation. She was found to be neutropenic and thrombocytopenic. She has been non-compliant with her home regimen of glimepiride. She was found to be in DKA and admitted to the ICU on an insulin infusion.  She was given IV fluids and started on insulin drip and her DKA quickly resolved.she was on prednisone at home for RA, She was started on basal/bolus insulin. Her hemoglobin A1c was 10.2%.she was transferred to floor and  remains stable on SSI,DKA likely duo to use of Prednisone,did not require insulin at DC time and will continue with home medication Amaryl.  Hematology and ID were consulted for pancytopenia. She was afebrile. She had mucositis. She had methotrexate toxicity and was started on leucovorin (folinic acid). MTX level not elevated.MTX was hold , BCx NGTD. UCx from 1/20 with ESBL E coli but <100k cfu. Repeat UCx NGTD.ID started on empiric  Zosyn until ANC improves.ANC and pancytopenia is resolved,  Consulted PT,OT .recommneded  HH.  Her mouth mucositis much  improved.  Patient and family want NH placement,consulted SW.      Interval History: much more alert.    Review of Systems   Constitutional: Negative for chills and fever.   HENT: Positive for mouth sores. Negative for congestion and rhinorrhea.    Eyes: Negative for photophobia and visual disturbance.   Respiratory: Negative for cough and shortness of breath.    Cardiovascular: Negative for chest pain and leg swelling.   Gastrointestinal: Negative for abdominal pain, nausea and vomiting.   Genitourinary: Negative for vaginal pain.   Musculoskeletal: Negative for joint swelling and neck stiffness.   Skin: Negative for rash and wound.   Neurological: Negative for dizziness and light-headedness.   Psychiatric/Behavioral: Negative for agitation and behavioral problems.     Objective:     Vital Signs (Most Recent):  Temp: 98.9 °F (37.2 °C) (01/26/18 0743)  Pulse: 78 (01/26/18 0743)  Resp: 18 (01/26/18 0743)  BP: 132/61 (01/26/18 0743)  SpO2: 95 % (01/26/18 0743) Vital Signs (24h Range):  Temp:  [96.2 °F (35.7 °C)-98.9 °F (37.2 °C)] 98.9 °F (37.2 °C)  Pulse:  [69-79] 78  Resp:  [18] 18  SpO2:  [95 %-99 %] 95 %  BP: (100-139)/(49-61) 132/61     Weight: 54.7 kg (120 lb 9.5 oz)  Body mass index is 19.46 kg/m².    Intake/Output Summary (Last 24 hours) at 01/26/18 0834  Last data filed at 01/25/18 2300   Gross per 24 hour   Intake              360 ml   Output                0 ml   Net              360 ml      Physical Exam   Constitutional: She appears well-developed and well-nourished. No distress.   HENT:   Right Ear: External ear normal.   Left Ear: External ear normal.   Nose: Nose normal.   Eyes: EOM are normal. Pupils are equal, round, and reactive to light. No scleral icterus.   Neck: Normal range of motion. Neck supple. No thyromegaly present.   Cardiovascular: Normal rate and normal heart sounds.  Exam reveals no friction rub.    No murmur heard.  Pulmonary/Chest: Effort normal and breath sounds normal. No  respiratory distress. She has no wheezes. She has no rales.   Abdominal: Soft. Bowel sounds are normal. She exhibits no mass. There is no tenderness.   Musculoskeletal: Normal range of motion. She exhibits no edema or deformity.   Neurological: She is alert. No cranial nerve deficit.   Skin: Skin is warm and dry. No pallor.       Significant Labs: All pertinent labs within the past 24 hours have been reviewed.    Significant Imaging: I have reviewed and interpreted all pertinent imaging results/findings within the past 24 hours.    Assessment/Plan:      * Diabetic ketoacidosis without coma associated with type 2 diabetes mellitus    Resolved. Transitioned to SSI.. Adjusted for hypoglycemia.          Noncompliance with medication regimen    Likely contributory to DKA.         Acquired immunocompromised state    Immunocompromised state due to treatment with methotrexate and plaquenil for rheumatoid arthritis treatment.        Vaginal candidiasis    Reports improvement with miconazole and diflucan. Candidiasis vs mucositis or both? I don't see KOH test.        Oral thrush    Symptomatic improvement with nystatin/diflucan and hurricane spray.         Neutropenia    Drug induced. Neutropenic precautions.improving.        Pancytopenia    Appreciate ID and hem recs. Hold MTX and plaquenil. Improving.        AICD (automatic cardioverter/defibrillator) present    No events. Stable.        Essential hypertension    Home metoprolol and ramipril held for hypotension.        Rheumatoid arthritis    Holding home meds        CAD (coronary artery disease)    No acute issue. Cont home meds.          VTE Risk Mitigation         Ordered     Medium Risk of VTE  Once      01/20/18 2347     Place CHANO hose  Until discontinued      01/20/18 2347     Place sequential compression device  Until discontinued      01/20/18 2347              Michel Woody MD  Department of Hospital Medicine   [facility]

## 2018-01-26 NOTE — PLAN OF CARE
Problem: Physical Therapy Goal  Goal: Physical Therapy Goal  Goals to be met by: 18    Patient will increase functional independence with mobility by performin. Supine to sit with supervision  2. Sit to stand transfer with supervision  3. Gait  x 250 feet with Supervision using rolling walker if needed  4. Lower extremity exercise program x30 reps per handout, with supervision     Outcome: Ongoing (interventions implemented as appropriate)  Pt required max encouragement for participation in therapy session.  Pt only agreeable to short walk and returning to bed.  Pt ambulated ~84ft with R HHA, CGA-Min A due to lateral swaying

## 2018-01-26 NOTE — PT/OT/SLP PROGRESS
"Physical Therapy Treatment    Patient Name:  Ruthann Doe   MRN:  [id number]    Recommendations:     Discharge Recommendations:  home health PT (nursing home placement, per chart.)   Discharge Equipment Recommendations: none   Barriers to discharge: Decreased caregiver support    Assessment:     Ruthann Doe is a 77 y.o. female admitted with a medical diagnosis of Diabetic ketoacidosis without coma associated with type 2 diabetes mellitus.  She presents with the following impairments/functional limitations:  weakness, impaired endurance, impaired self care skills, gait instability, decreased coordination, impaired functional mobilty, impaired balance, decreased safety awareness, decreased lower extremity function.  Pt required max encouragement for participation in therapy session.  Pt only agreeable to short walk and returning to bed.  Pt ambulated ~84ft with R HHA, CGA-Min A due to lateral swaying.    Rehab Prognosis:  fair; patient would benefit from acute skilled PT services to address these deficits and reach maximum level of function.      Recent Surgery: * No surgery found *      Plan:     During this hospitalization, patient to be seen 6 x/week to address the above listed problems via gait training, therapeutic activities, therapeutic exercises  · Plan of Care Expires:  02/07/18   Plan of Care Reviewed with: patient, sibling (brother)    Subjective     Communicated with pt's nurse, Sarah, prior to session.  Patient found reclined in [place] upon PT entry to room, agreeable to treatment.      Chief Complaint: tired  Patient comments/goals: Pt states " Just come back later because I am tired and don't feel like it."  (Required max encouragement for participation.)  Pt agreeable to short ambulation.  Pain/Comfort:  · Pain Rating 1: 0/10    Patients cultural, spiritual, Islam conflicts given the current situation:      Objective:     Patient found with: peripheral IV     General Precautions: " Standard, fall   Orthopedic Precautions:N/A   Braces: N/A     Functional Mobility:  · Bed Mobility:     · Sit to Supine: stand by assistance  · Transfers:     · Sit to Stand:  contact guard assistance with no AD  · Gait: Pt ambulated ~84ft with R HHA, CGA- MIn A due to lateral swaying.  · Balance: pt able to sit EOB with SBA, requires CGA for static standing and CGA/SBA for dynamic standing      AM-PAC 6 CLICK MOBILITY  Turning over in bed (including adjusting bedclothes, sheets and blankets)?: 4  Sitting down on and standing up from a chair with arms (e.g., wheelchair, bedside commode, etc.): 3  Moving from lying on back to sitting on the side of the bed?: 3  Moving to and from a bed to a chair (including a wheelchair)?: 3  Need to walk in hospital room?: 3  Climbing 3-5 steps with a railing?: 2  Total Score: 18       Therapeutic Activities and Exercises:   Pt performed BM, transfers, and ambulation.    Patient left L sidelying with pillow b/w B knees with all lines intact, call button in reach, bed alarm on, pt's nurse, Sarah, notified and brother present..    GOALS:    Physical Therapy Goals        Problem: Physical Therapy Goal    Goal Priority Disciplines Outcome Goal Variances Interventions   Physical Therapy Goal     PT/OT, PT Ongoing (interventions implemented as appropriate)     Description:  Goals to be met by: 18    Patient will increase functional independence with mobility by performin. Supine to sit with supervision  2. Sit to stand transfer with supervision  3. Gait  x 250 feet with Supervision using rolling walker if needed  4. Lower extremity exercise program x30 reps per handout, with supervision                      Time Tracking:     PT Received On: 18  PT Start Time: 1355     PT Stop Time: 1412  PT Total Time (min): 17 min     Billable Minutes: Gait Training 17    Treatment Type: Treatment  PT/PTA: PTA     PTA Visit Number: 2     Fern Corry, PTA  2018

## 2018-01-26 NOTE — SUBJECTIVE & OBJECTIVE
Interval History: much more alert.    Review of Systems   Constitutional: Negative for chills and fever.   HENT: Positive for mouth sores. Negative for congestion and rhinorrhea.    Eyes: Negative for photophobia and visual disturbance.   Respiratory: Negative for cough and shortness of breath.    Cardiovascular: Negative for chest pain and leg swelling.   Gastrointestinal: Negative for abdominal pain, nausea and vomiting.   Genitourinary: Negative for vaginal pain.   Musculoskeletal: Negative for joint swelling and neck stiffness.   Skin: Negative for rash and wound.   Neurological: Negative for dizziness and light-headedness.   Psychiatric/Behavioral: Negative for agitation and behavioral problems.     Objective:     Vital Signs (Most Recent):  Temp: 98.9 °F (37.2 °C) (01/26/18 0743)  Pulse: 78 (01/26/18 0743)  Resp: 18 (01/26/18 0743)  BP: 132/61 (01/26/18 0743)  SpO2: 95 % (01/26/18 0743) Vital Signs (24h Range):  Temp:  [96.2 °F (35.7 °C)-98.9 °F (37.2 °C)] 98.9 °F (37.2 °C)  Pulse:  [69-79] 78  Resp:  [18] 18  SpO2:  [95 %-99 %] 95 %  BP: (100-139)/(49-61) 132/61     Weight: 54.7 kg (120 lb 9.5 oz)  Body mass index is 19.46 kg/m².    Intake/Output Summary (Last 24 hours) at 01/26/18 0834  Last data filed at 01/25/18 2300   Gross per 24 hour   Intake              360 ml   Output                0 ml   Net              360 ml      Physical Exam   Constitutional: She appears well-developed and well-nourished. No distress.   HENT:   Right Ear: External ear normal.   Left Ear: External ear normal.   Nose: Nose normal.   Eyes: EOM are normal. Pupils are equal, round, and reactive to light. No scleral icterus.   Neck: Normal range of motion. Neck supple. No thyromegaly present.   Cardiovascular: Normal rate and normal heart sounds.  Exam reveals no friction rub.    No murmur heard.  Pulmonary/Chest: Effort normal and breath sounds normal. No respiratory distress. She has no wheezes. She has no rales.   Abdominal:  Soft. Bowel sounds are normal. She exhibits no mass. There is no tenderness.   Musculoskeletal: Normal range of motion. She exhibits no edema or deformity.   Neurological: She is alert. No cranial nerve deficit.   Skin: Skin is warm and dry. No pallor.       Significant Labs: All pertinent labs within the past 24 hours have been reviewed.    Significant Imaging: I have reviewed and interpreted all pertinent imaging results/findings within the past 24 hours.

## 2018-01-26 NOTE — PLAN OF CARE
Problem: Diabetes, Type 2 (Adult)  Goal: Signs and Symptoms of Listed Potential Problems Will be Absent, Minimized or Managed (Diabetes, Type 2)  Signs and symptoms of listed potential problems will be absent, minimized or managed by discharge/transition of care (reference Diabetes, Type 2 (Adult) CPG).   Outcome: Ongoing (interventions implemented as appropriate)   01/26/18 0255   Diabetes, Type 2   Problems Assessed (Type 2 Diabetes) hyperglycemia   No ss coverage required for 121 mg/dl.    Problem: Fall Risk (Adult)  Goal: Absence of Falls  Patient will demonstrate the desired outcomes by discharge/transition of care.   Outcome: Ongoing (interventions implemented as appropriate)   01/26/18 0255   Fall Risk (Adult)   Absence of Falls making progress toward outcome   Located close to nursing station, instructed to call for assitance when needed.    Problem: Patient Care Overview  Goal: Plan of Care Review  Outcome: Ongoing (interventions implemented as appropriate)   01/26/18 0255   Coping/Psychosocial   Plan Of Care Reviewed With patient   Resting quietly at intervals, no report of pain, just discomfort form vaginal area. Tolerating diet and po liquids as ordered. Voiding frequently during night.    Problem: Pressure Ulcer Risk (Sabas Scale) (Adult,Obstetrics,Pediatric)  Goal: Skin Integrity  Patient will demonstrate the desired outcomes by discharge/transition of care.   Outcome: Ongoing (interventions implemented as appropriate)   01/26/18 0255   Pressure Ulcer Risk (Sabas Scale) (Adult,Obstetrics,Pediatric)   Skin Integrity making progress toward outcome   Vaginal area swollen, red and irritated, applying cream as ordered. Also has lesion in mouth, receiving nystatin swish/swallow as ordered.

## 2018-01-26 NOTE — PT/OT/SLP PROGRESS
"Physical Therapy      Patient Name:  Ruthann Doe   MRN:  [id number]    Patient not seen today secondary to  (Pt states " I do not feel like my self right now.  I don't want to get up.  Come back later.")  Despite encouragement pt continued to refuse at this time.  Will follow-up later today, if time permits.    Fern Wheatley, PTA    "

## 2018-01-27 PROBLEM — E87.6 HYPOKALEMIA: Status: ACTIVE | Noted: 2018-01-27

## 2018-01-27 LAB
ALBUMIN SERPL BCP-MCNC: 1.9 G/DL
ALP SERPL-CCNC: 71 U/L
ALT SERPL W/O P-5'-P-CCNC: 76 U/L
ANION GAP SERPL CALC-SCNC: 7 MMOL/L
ANISOCYTOSIS BLD QL SMEAR: SLIGHT
AST SERPL-CCNC: 70 U/L
BASOPHILS NFR BLD: 0 %
BILIRUB SERPL-MCNC: 0.3 MG/DL
BUN SERPL-MCNC: 5 MG/DL
CALCIUM SERPL-MCNC: 8.5 MG/DL
CHLORIDE SERPL-SCNC: 101 MMOL/L
CO2 SERPL-SCNC: 29 MMOL/L
CREAT SERPL-MCNC: 0.7 MG/DL
DIFFERENTIAL METHOD: ABNORMAL
EOSINOPHIL NFR BLD: 0 %
ERYTHROCYTE [DISTWIDTH] IN BLOOD BY AUTOMATED COUNT: 15.9 %
EST. GFR  (AFRICAN AMERICAN): >60 ML/MIN/1.73 M^2
EST. GFR  (NON AFRICAN AMERICAN): >60 ML/MIN/1.73 M^2
GLUCOSE SERPL-MCNC: 100 MG/DL
HCT VFR BLD AUTO: 24.1 %
HGB BLD-MCNC: 8.2 G/DL
LYMPHOCYTES NFR BLD: 16 %
MCH RBC QN AUTO: 30.5 PG
MCHC RBC AUTO-ENTMCNC: 34 G/DL
MCV RBC AUTO: 90 FL
METAMYELOCYTES NFR BLD MANUAL: 4 %
MONOCYTES NFR BLD: 14 %
MYELOCYTES NFR BLD MANUAL: 1 %
NEUTROPHILS NFR BLD: 26 %
NEUTS BAND NFR BLD MANUAL: 39 %
NRBC BLD-RTO: 2 /100 WBC
OVALOCYTES BLD QL SMEAR: ABNORMAL
PLATELET # BLD AUTO: 548 K/UL
PLATELET BLD QL SMEAR: ABNORMAL
PMV BLD AUTO: 10.4 FL
POCT GLUCOSE: 103 MG/DL (ref 70–110)
POCT GLUCOSE: 111 MG/DL (ref 70–110)
POCT GLUCOSE: 161 MG/DL (ref 70–110)
POCT GLUCOSE: 217 MG/DL (ref 70–110)
POLYCHROMASIA BLD QL SMEAR: ABNORMAL
POTASSIUM SERPL-SCNC: 3.4 MMOL/L
PROT SERPL-MCNC: 4.9 G/DL
RBC # BLD AUTO: 2.69 M/UL
SODIUM SERPL-SCNC: 137 MMOL/L
WBC # BLD AUTO: 11.22 K/UL

## 2018-01-27 PROCEDURE — 85027 COMPLETE CBC AUTOMATED: CPT

## 2018-01-27 PROCEDURE — 85007 BL SMEAR W/DIFF WBC COUNT: CPT

## 2018-01-27 PROCEDURE — 36415 COLL VENOUS BLD VENIPUNCTURE: CPT

## 2018-01-27 PROCEDURE — 25000003 PHARM REV CODE 250: Performed by: NURSE PRACTITIONER

## 2018-01-27 PROCEDURE — 25000003 PHARM REV CODE 250: Performed by: HOSPITALIST

## 2018-01-27 PROCEDURE — 80053 COMPREHEN METABOLIC PANEL: CPT

## 2018-01-27 PROCEDURE — 11000001 HC ACUTE MED/SURG PRIVATE ROOM

## 2018-01-27 RX ADMIN — FAMOTIDINE 20 MG: 20 TABLET, FILM COATED ORAL at 08:01

## 2018-01-27 RX ADMIN — PRAVASTATIN SODIUM 40 MG: 40 TABLET ORAL at 08:01

## 2018-01-27 RX ADMIN — POTASSIUM BICARBONATE 50 MEQ: 25 TABLET, EFFERVESCENT ORAL at 08:01

## 2018-01-27 RX ADMIN — INSULIN ASPART 4 UNITS: 100 INJECTION, SOLUTION INTRAVENOUS; SUBCUTANEOUS at 12:01

## 2018-01-27 RX ADMIN — ASPIRIN 325 MG: 325 TABLET, DELAYED RELEASE ORAL at 08:01

## 2018-01-27 RX ADMIN — RAMIPRIL 2.5 MG: 2.5 CAPSULE ORAL at 08:01

## 2018-01-27 RX ADMIN — METOPROLOL SUCCINATE 25 MG: 25 TABLET, EXTENDED RELEASE ORAL at 08:01

## 2018-01-27 RX ADMIN — MICONAZOLE NITRATE: 20 OINTMENT TOPICAL at 09:01

## 2018-01-27 RX ADMIN — INSULIN ASPART 1 UNITS: 100 INJECTION, SOLUTION INTRAVENOUS; SUBCUTANEOUS at 08:01

## 2018-01-27 RX ADMIN — CLOPIDOGREL BISULFATE 75 MG: 75 TABLET ORAL at 08:01

## 2018-01-27 RX ADMIN — MICONAZOLE NITRATE: 20 OINTMENT TOPICAL at 12:01

## 2018-01-27 NOTE — PROGRESS NOTES
[facility] Medicine  Progress Note    Patient Name: Ruthann Doe  MRN: [id number]  Patient Class: IP- Inpatient   Admission Date: 1/20/2018  Length of Stay: 7 days  Attending Physician: Michel Woody MD  Primary Care Provider: Jesse Partida MD        Subjective:     Principal Problem:Diabetic ketoacidosis without coma associated with type 2 diabetes mellitus    HPI:  Ms. Doe is a 76 yo woman with NIDDM (A1c 10.2%), CKD3, CAD, and RA who presented for painful mouth lesions. She also complains of vaginal irritation. She was found to be neutropenic and thrombocytopenic. She has been non-compliant with her home regimen of glimepiride. She was found to be in DKA and admitted to the ICU on an insulin infusion. Please see H&P for full detail.     Hospital Course:  Ms. Doe is a 76 yo woman with NIDDM (A1c 10.2%), CKD3, CAD, and RA who presented for painful mouth lesions. She also complains of vaginal irritation. She was found to be neutropenic and thrombocytopenic. She has been non-compliant with her home regimen of glimepiride. She was found to be in DKA and admitted to the ICU on an insulin infusion.  She was given IV fluids and started on insulin drip and her DKA quickly resolved.she was on prednisone at home for RA, She was started on basal/bolus insulin. Her hemoglobin A1c was 10.2%.she was transferred to floor and  remains stable on SSI,DKA likely duo to use of Prednisone,did not require insulin at DC time and will continue with home medication Amaryl.  Hematology and ID were consulted for pancytopenia. She was afebrile. She had mucositis. She had methotrexate toxicity and was started on leucovorin (folinic acid). MTX level not elevated.MTX was hold , BCx NGTD. UCx from 1/20 with ESBL E coli but <100k cfu. Repeat UCx NGTD.ID started on empiric  Zosyn until ANC improves.ANC and pancytopenia is resolved,  Consulted PT,OT .recommneded  HH.  Her mouth mucositis much  improved.  Patient and family want NH placement,consulted SW.      Interval History: much more alert.    Review of Systems   Constitutional: Negative for chills and fever.   HENT: Positive for mouth sores. Negative for congestion and rhinorrhea.    Eyes: Negative for photophobia and visual disturbance.   Respiratory: Negative for cough and shortness of breath.    Cardiovascular: Negative for chest pain and leg swelling.   Gastrointestinal: Negative for abdominal pain, nausea and vomiting.   Genitourinary: Negative for vaginal pain.   Musculoskeletal: Negative for joint swelling and neck stiffness.   Skin: Negative for rash and wound.   Neurological: Negative for dizziness and light-headedness.   Psychiatric/Behavioral: Negative for agitation and behavioral problems.     Objective:     Vital Signs (Most Recent):  Temp: 98.2 °F (36.8 °C) (01/26/18 2333)  Pulse: 69 (01/26/18 2333)  Resp: 18 (01/26/18 2333)  BP: (!) 132/57 (01/26/18 2333)  SpO2: 98 % (01/26/18 2333) Vital Signs (24h Range):  Temp:  [98.1 °F (36.7 °C)-98.5 °F (36.9 °C)] 98.2 °F (36.8 °C)  Pulse:  [66-73] 69  Resp:  [18] 18  SpO2:  [97 %-99 %] 98 %  BP: (109-143)/(48-65) 132/57     Weight: 54.7 kg (120 lb 9.5 oz)  Body mass index is 19.46 kg/m².    Intake/Output Summary (Last 24 hours) at 01/27/18 0759  Last data filed at 01/27/18 0500   Gross per 24 hour   Intake             1140 ml   Output                0 ml   Net             1140 ml      Physical Exam   Constitutional: She appears well-developed and well-nourished. No distress.   HENT:   Right Ear: External ear normal.   Left Ear: External ear normal.   Nose: Nose normal.   Eyes: EOM are normal. Pupils are equal, round, and reactive to light. No scleral icterus.   Neck: Normal range of motion. Neck supple. No thyromegaly present.   Cardiovascular: Normal rate and normal heart sounds.  Exam reveals no friction rub.    No murmur heard.  Pulmonary/Chest: Effort normal and breath sounds normal. No  respiratory distress. She has no wheezes. She has no rales.   Abdominal: Soft. Bowel sounds are normal. She exhibits no mass. There is no tenderness.   Musculoskeletal: Normal range of motion. She exhibits no edema or deformity.   Neurological: She is alert. No cranial nerve deficit.   Skin: Skin is warm and dry. No pallor.       Significant Labs: All pertinent labs within the past 24 hours have been reviewed.    Significant Imaging: I have reviewed and interpreted all pertinent imaging results/findings within the past 24 hours.    Assessment/Plan:      * Diabetic ketoacidosis without coma associated with type 2 diabetes mellitus    Resolved. Transitioned to SSI.. Adjusted for hypoglycemia.          Hypokalemia      Replaced.        Noncompliance with medication regimen    Likely contributory to DKA.         Acquired immunocompromised state    Immunocompromised state due to treatment with methotrexate and plaquenil for rheumatoid arthritis treatment.        Vaginal candidiasis    Reports improvement with miconazole and diflucan. Candidiasis vs mucositis or both? I don't see KOH test.        Oral thrush    Symptomatic improvement with nystatin/diflucan and hurricane spray.         Neutropenia    Drug induced. Neutropenic precautions.improving.        Pancytopenia    Appreciate ID and hem recs. Hold MTX and plaquenil. Improving.        AICD (automatic cardioverter/defibrillator) present    No events. Stable.        Essential hypertension    Home metoprolol and ramipril held for hypotension.        Rheumatoid arthritis    Holding home meds        CAD (coronary artery disease)    No acute issue. Cont home meds.          VTE Risk Mitigation         Ordered     Medium Risk of VTE  Once      01/20/18 2347     Place CHANO hose  Until discontinued      01/20/18 2347     Place sequential compression device  Until discontinued      01/20/18 2347              Michel Woody MD  Department of Hospital Medicine   [facility]

## 2018-01-27 NOTE — PLAN OF CARE
Problem: Patient Care Overview  Goal: Plan of Care Review  Continue with plan of care.  Repositioned as needed. No new issues on today. Medicated as needed for pain. Vital signs stable. Up in chair. Repair free of fall or injury. Instructed to call as needed. Perineal area looks much better. Cream applied as needed.

## 2018-01-27 NOTE — SUBJECTIVE & OBJECTIVE
Interval History: much more alert.    Review of Systems   Constitutional: Negative for chills and fever.   HENT: Positive for mouth sores. Negative for congestion and rhinorrhea.    Eyes: Negative for photophobia and visual disturbance.   Respiratory: Negative for cough and shortness of breath.    Cardiovascular: Negative for chest pain and leg swelling.   Gastrointestinal: Negative for abdominal pain, nausea and vomiting.   Genitourinary: Negative for vaginal pain.   Musculoskeletal: Negative for joint swelling and neck stiffness.   Skin: Negative for rash and wound.   Neurological: Negative for dizziness and light-headedness.   Psychiatric/Behavioral: Negative for agitation and behavioral problems.     Objective:     Vital Signs (Most Recent):  Temp: 98.2 °F (36.8 °C) (01/26/18 2333)  Pulse: 69 (01/26/18 2333)  Resp: 18 (01/26/18 2333)  BP: (!) 132/57 (01/26/18 2333)  SpO2: 98 % (01/26/18 2333) Vital Signs (24h Range):  Temp:  [98.1 °F (36.7 °C)-98.5 °F (36.9 °C)] 98.2 °F (36.8 °C)  Pulse:  [66-73] 69  Resp:  [18] 18  SpO2:  [97 %-99 %] 98 %  BP: (109-143)/(48-65) 132/57     Weight: 54.7 kg (120 lb 9.5 oz)  Body mass index is 19.46 kg/m².    Intake/Output Summary (Last 24 hours) at 01/27/18 0759  Last data filed at 01/27/18 0500   Gross per 24 hour   Intake             1140 ml   Output                0 ml   Net             1140 ml      Physical Exam   Constitutional: She appears well-developed and well-nourished. No distress.   HENT:   Right Ear: External ear normal.   Left Ear: External ear normal.   Nose: Nose normal.   Eyes: EOM are normal. Pupils are equal, round, and reactive to light. No scleral icterus.   Neck: Normal range of motion. Neck supple. No thyromegaly present.   Cardiovascular: Normal rate and normal heart sounds.  Exam reveals no friction rub.    No murmur heard.  Pulmonary/Chest: Effort normal and breath sounds normal. No respiratory distress. She has no wheezes. She has no rales.   Abdominal:  Soft. Bowel sounds are normal. She exhibits no mass. There is no tenderness.   Musculoskeletal: Normal range of motion. She exhibits no edema or deformity.   Neurological: She is alert. No cranial nerve deficit.   Skin: Skin is warm and dry. No pallor.       Significant Labs: All pertinent labs within the past 24 hours have been reviewed.    Significant Imaging: I have reviewed and interpreted all pertinent imaging results/findings within the past 24 hours.

## 2018-01-27 NOTE — PLAN OF CARE
Problem: Diabetes, Type 2 (Adult)  Goal: Signs and Symptoms of Listed Potential Problems Will be Absent, Minimized or Managed (Diabetes, Type 2)  Signs and symptoms of listed potential problems will be absent, minimized or managed by discharge/transition of care (reference Diabetes, Type 2 (Adult) CPG).   Outcome: Ongoing (interventions implemented as appropriate)   01/27/18 0256   Diabetes, Type 2   Problems Assessed (Type 2 Diabetes) hyperglycemia   No ss coverage for blood glucose 63 mg/dl, medicated with 16 grams glucose tabs and drank apple juice.    Problem: Fall Risk (Adult)  Goal: Absence of Falls  Patient will demonstrate the desired outcomes by discharge/transition of care.   Outcome: Ongoing (interventions implemented as appropriate)   01/27/18 0256   Fall Risk (Adult)   Absence of Falls making progress toward outcome   Located close to nursing station, instructed to call for assistance when needed.     Problem: Patient Care Overview  Goal: Plan of Care Review  Outcome: Ongoing (interventions implemented as appropriate)   01/27/18 0256   Coping/Psychosocial   Plan Of Care Reviewed With patient   Rested quietly at intervals, no report  Pain. Continues with discomfort when cleaning vaginal and perineal area. Remains red and irritated, swelling decreased. Tolerating diet and drinking plenty of po fluids.

## 2018-01-28 LAB
ALBUMIN SERPL BCP-MCNC: 1.9 G/DL
ALP SERPL-CCNC: 74 U/L
ALT SERPL W/O P-5'-P-CCNC: 60 U/L
ANION GAP SERPL CALC-SCNC: 6 MMOL/L
ANISOCYTOSIS BLD QL SMEAR: SLIGHT
AST SERPL-CCNC: 48 U/L
BASOPHILS NFR BLD: 1 %
BILIRUB SERPL-MCNC: 0.3 MG/DL
BUN SERPL-MCNC: 5 MG/DL
CALCIUM SERPL-MCNC: 8.6 MG/DL
CHLORIDE SERPL-SCNC: 100 MMOL/L
CO2 SERPL-SCNC: 30 MMOL/L
CREAT SERPL-MCNC: 0.7 MG/DL
DIFFERENTIAL METHOD: ABNORMAL
EOSINOPHIL NFR BLD: 1 %
ERYTHROCYTE [DISTWIDTH] IN BLOOD BY AUTOMATED COUNT: 15.4 %
EST. GFR  (AFRICAN AMERICAN): >60 ML/MIN/1.73 M^2
EST. GFR  (NON AFRICAN AMERICAN): >60 ML/MIN/1.73 M^2
GIANT PLATELETS BLD QL SMEAR: PRESENT
GLUCOSE SERPL-MCNC: 98 MG/DL
HCT VFR BLD AUTO: 25.5 %
HGB BLD-MCNC: 8.3 G/DL
LYMPHOCYTES NFR BLD: 26 %
MAGNESIUM SERPL-MCNC: 1.5 MG/DL
MCH RBC QN AUTO: 30.2 PG
MCHC RBC AUTO-ENTMCNC: 32.5 G/DL
MCV RBC AUTO: 93 FL
METAMYELOCYTES NFR BLD MANUAL: 3 %
MONOCYTES NFR BLD: 9 %
MYELOCYTES NFR BLD MANUAL: 5 %
NEUTROPHILS NFR BLD: 43 %
NEUTS BAND NFR BLD MANUAL: 12 %
PHOSPHATE SERPL-MCNC: 2.8 MG/DL
PLATELET # BLD AUTO: 661 K/UL
PLATELET BLD QL SMEAR: ABNORMAL
PMV BLD AUTO: 10.5 FL
POCT GLUCOSE: 111 MG/DL (ref 70–110)
POCT GLUCOSE: 241 MG/DL (ref 70–110)
POCT GLUCOSE: 261 MG/DL (ref 70–110)
POCT GLUCOSE: 271 MG/DL (ref 70–110)
POLYCHROMASIA BLD QL SMEAR: ABNORMAL
POTASSIUM SERPL-SCNC: 3.7 MMOL/L
PROT SERPL-MCNC: 5.1 G/DL
RBC # BLD AUTO: 2.75 M/UL
SODIUM SERPL-SCNC: 136 MMOL/L
TB INDURATION 48 - 72 HR READ: 0 MM
TOXIC GRANULES BLD QL SMEAR: PRESENT
WBC # BLD AUTO: 9.85 K/UL

## 2018-01-28 PROCEDURE — 80053 COMPREHEN METABOLIC PANEL: CPT

## 2018-01-28 PROCEDURE — 84100 ASSAY OF PHOSPHORUS: CPT

## 2018-01-28 PROCEDURE — 83735 ASSAY OF MAGNESIUM: CPT

## 2018-01-28 PROCEDURE — 25000003 PHARM REV CODE 250: Performed by: NURSE PRACTITIONER

## 2018-01-28 PROCEDURE — 36415 COLL VENOUS BLD VENIPUNCTURE: CPT

## 2018-01-28 PROCEDURE — 85027 COMPLETE CBC AUTOMATED: CPT

## 2018-01-28 PROCEDURE — 85007 BL SMEAR W/DIFF WBC COUNT: CPT

## 2018-01-28 PROCEDURE — 11000001 HC ACUTE MED/SURG PRIVATE ROOM

## 2018-01-28 PROCEDURE — 25000003 PHARM REV CODE 250: Performed by: HOSPITALIST

## 2018-01-28 RX ADMIN — MICONAZOLE NITRATE: 20 OINTMENT TOPICAL at 08:01

## 2018-01-28 RX ADMIN — INSULIN ASPART 4 UNITS: 100 INJECTION, SOLUTION INTRAVENOUS; SUBCUTANEOUS at 12:01

## 2018-01-28 RX ADMIN — METOPROLOL SUCCINATE 25 MG: 25 TABLET, EXTENDED RELEASE ORAL at 08:01

## 2018-01-28 RX ADMIN — PRAVASTATIN SODIUM 40 MG: 40 TABLET ORAL at 08:01

## 2018-01-28 RX ADMIN — CLOPIDOGREL BISULFATE 75 MG: 75 TABLET ORAL at 08:01

## 2018-01-28 RX ADMIN — FAMOTIDINE 20 MG: 20 TABLET, FILM COATED ORAL at 08:01

## 2018-01-28 RX ADMIN — INSULIN ASPART 6 UNITS: 100 INJECTION, SOLUTION INTRAVENOUS; SUBCUTANEOUS at 05:01

## 2018-01-28 RX ADMIN — INSULIN ASPART 3 UNITS: 100 INJECTION, SOLUTION INTRAVENOUS; SUBCUTANEOUS at 08:01

## 2018-01-28 RX ADMIN — RAMIPRIL 2.5 MG: 2.5 CAPSULE ORAL at 08:01

## 2018-01-28 NOTE — SUBJECTIVE & OBJECTIVE
Interval History: much more alert.    Review of Systems   Constitutional: Negative for chills and fever.   HENT: Positive for mouth sores. Negative for congestion and rhinorrhea.    Eyes: Negative for photophobia and visual disturbance.   Respiratory: Negative for cough and shortness of breath.    Cardiovascular: Negative for chest pain and leg swelling.   Gastrointestinal: Negative for abdominal pain, nausea and vomiting.   Genitourinary: Negative for vaginal pain.   Musculoskeletal: Negative for joint swelling and neck stiffness.   Skin: Negative for rash and wound.   Neurological: Negative for dizziness and light-headedness.   Psychiatric/Behavioral: Negative for agitation and behavioral problems.     Objective:     Vital Signs (Most Recent):  Temp: 98.9 °F (37.2 °C) (01/28/18 0325)  Pulse: 67 (01/28/18 0325)  Resp: 18 (01/28/18 0325)  BP: 117/69 (01/28/18 0325)  SpO2: 96 % (01/28/18 0325) Vital Signs (24h Range):  Temp:  [98.4 °F (36.9 °C)-99.8 °F (37.7 °C)] 98.9 °F (37.2 °C)  Pulse:  [64-72] 67  Resp:  [17-18] 18  SpO2:  [96 %-98 %] 96 %  BP: (116-146)/(54-69) 117/69     Weight: 54.7 kg (120 lb 9.5 oz)  Body mass index is 19.46 kg/m².  No intake or output data in the 24 hours ending 01/28/18 0631   Physical Exam   Constitutional: She appears well-developed and well-nourished. No distress.   HENT:   Right Ear: External ear normal.   Left Ear: External ear normal.   Nose: Nose normal.   Eyes: EOM are normal. Pupils are equal, round, and reactive to light. No scleral icterus.   Neck: Normal range of motion. Neck supple. No thyromegaly present.   Cardiovascular: Normal rate and normal heart sounds.  Exam reveals no friction rub.    No murmur heard.  Pulmonary/Chest: Effort normal and breath sounds normal. No respiratory distress. She has no wheezes. She has no rales.   Abdominal: Soft. Bowel sounds are normal. She exhibits no mass. There is no tenderness.   Musculoskeletal: Normal range of motion. She exhibits no  edema or deformity.   Neurological: She is alert. No cranial nerve deficit.   Skin: Skin is warm and dry. No pallor.       Significant Labs: All pertinent labs within the past 24 hours have been reviewed.    Significant Imaging: I have reviewed and interpreted all pertinent imaging results/findings within the past 24 hours.

## 2018-01-28 NOTE — PLAN OF CARE
Problem: Diabetes, Type 2 (Adult)  Goal: Signs and Symptoms of Listed Potential Problems Will be Absent, Minimized or Managed (Diabetes, Type 2)  Signs and symptoms of listed potential problems will be absent, minimized or managed by discharge/transition of care (reference Diabetes, Type 2 (Adult) CPG).   Outcome: Ongoing (interventions implemented as appropriate)   01/28/18 0312   Diabetes, Type 2   Problems Assessed (Type 2 Diabetes) hyperglycemia   Received ss coverage for blood glucose 161 mg/dl.    Problem: Fall Risk (Adult)  Goal: Absence of Falls  Patient will demonstrate the desired outcomes by discharge/transition of care.   Outcome: Ongoing (interventions implemented as appropriate)   01/28/18 0312   Fall Risk (Adult)   Absence of Falls making progress toward outcome   Located close to nursing station with bed alarm on. Instructed to call for assistance when needed.    Problem: Patient Care Overview  Goal: Plan of Care Review  Outcome: Ongoing (interventions implemented as appropriate)   01/28/18 0312   Coping/Psychosocial   Plan Of Care Reviewed With patient   Resting quietly during night, no report of pain, discomfort to genital and oral cavity area. Tolerating diet and drinking non-acedic po fluids. Repositioning without assistance.    Problem: Pressure Ulcer Risk (Sabas Scale) (Adult,Obstetrics,Pediatric)  Goal: Skin Integrity  Patient will demonstrate the desired outcomes by discharge/transition of care.   Outcome: Ongoing (interventions implemented as appropriate)   01/28/18 0312   Pressure Ulcer Risk (Sabas Scale) (Adult,Obstetrics,Pediatric)   Skin Integrity making progress toward outcome   Swelling improved and redness decreased to vaginal and perineal areas.

## 2018-01-28 NOTE — PROGRESS NOTES
[facility] Medicine  Progress Note    Patient Name: Ruthann Doe  MRN: [id number]  Patient Class: IP- Inpatient   Admission Date: 1/20/2018  Length of Stay: 8 days  Attending Physician: Michel Woody MD  Primary Care Provider: Jesse Partida MD        Subjective:     Principal Problem:Diabetic ketoacidosis without coma associated with type 2 diabetes mellitus    HPI:  Ms. Doe is a 78 yo woman with NIDDM (A1c 10.2%), CKD3, CAD, and RA who presented for painful mouth lesions. She also complains of vaginal irritation. She was found to be neutropenic and thrombocytopenic. She has been non-compliant with her home regimen of glimepiride. She was found to be in DKA and admitted to the ICU on an insulin infusion. Please see H&P for full detail.     Hospital Course:  Ms. Doe is a 78 yo woman with NIDDM (A1c 10.2%), CKD3, CAD, and RA who presented for painful mouth lesions. She also complains of vaginal irritation. She was found to be neutropenic and thrombocytopenic. She has been non-compliant with her home regimen of glimepiride. She was found to be in DKA and admitted to the ICU on an insulin infusion.  She was given IV fluids and started on insulin drip and her DKA quickly resolved.she was on prednisone at home for RA, She was started on basal/bolus insulin. Her hemoglobin A1c was 10.2%.she was transferred to floor and  remains stable on SSI,DKA likely duo to use of Prednisone,did not require insulin at DC time and will continue with home medication Amaryl.  Hematology and ID were consulted for pancytopenia. She was afebrile. She had mucositis. She had methotrexate toxicity and was started on leucovorin (folinic acid). MTX level not elevated.MTX was hold , BCx NGTD. UCx from 1/20 with ESBL E coli but <100k cfu. Repeat UCx NGTD.ID started on empiric  Zosyn until ANC improves.ANC and pancytopenia is resolved,  Consulted PT,OT .recommneded  HH.  Her mouth mucositis much  improved.  Patient and family want NH placement,consulted SW.      Interval History: much more alert.    Review of Systems   Constitutional: Negative for chills and fever.   HENT: Positive for mouth sores. Negative for congestion and rhinorrhea.    Eyes: Negative for photophobia and visual disturbance.   Respiratory: Negative for cough and shortness of breath.    Cardiovascular: Negative for chest pain and leg swelling.   Gastrointestinal: Negative for abdominal pain, nausea and vomiting.   Genitourinary: Negative for vaginal pain.   Musculoskeletal: Negative for joint swelling and neck stiffness.   Skin: Negative for rash and wound.   Neurological: Negative for dizziness and light-headedness.   Psychiatric/Behavioral: Negative for agitation and behavioral problems.     Objective:     Vital Signs (Most Recent):  Temp: 98.9 °F (37.2 °C) (01/28/18 0325)  Pulse: 67 (01/28/18 0325)  Resp: 18 (01/28/18 0325)  BP: 117/69 (01/28/18 0325)  SpO2: 96 % (01/28/18 0325) Vital Signs (24h Range):  Temp:  [98.4 °F (36.9 °C)-99.8 °F (37.7 °C)] 98.9 °F (37.2 °C)  Pulse:  [64-72] 67  Resp:  [17-18] 18  SpO2:  [96 %-98 %] 96 %  BP: (116-146)/(54-69) 117/69     Weight: 54.7 kg (120 lb 9.5 oz)  Body mass index is 19.46 kg/m².  No intake or output data in the 24 hours ending 01/28/18 0631   Physical Exam   Constitutional: She appears well-developed and well-nourished. No distress.   HENT:   Right Ear: External ear normal.   Left Ear: External ear normal.   Nose: Nose normal.   Eyes: EOM are normal. Pupils are equal, round, and reactive to light. No scleral icterus.   Neck: Normal range of motion. Neck supple. No thyromegaly present.   Cardiovascular: Normal rate and normal heart sounds.  Exam reveals no friction rub.    No murmur heard.  Pulmonary/Chest: Effort normal and breath sounds normal. No respiratory distress. She has no wheezes. She has no rales.   Abdominal: Soft. Bowel sounds are normal. She exhibits no mass. There is no  tenderness.   Musculoskeletal: Normal range of motion. She exhibits no edema or deformity.   Neurological: She is alert. No cranial nerve deficit.   Skin: Skin is warm and dry. No pallor.       Significant Labs: All pertinent labs within the past 24 hours have been reviewed.    Significant Imaging: I have reviewed and interpreted all pertinent imaging results/findings within the past 24 hours.    Assessment/Plan:      * Diabetic ketoacidosis without coma associated with type 2 diabetes mellitus    Resolved. Transitioned to SSI.. Adjusted for hypoglycemia.          Hypokalemia      Replaced.        Noncompliance with medication regimen    Likely contributory to DKA.         Acquired immunocompromised state    Immunocompromised state due to treatment with methotrexate and plaquenil for rheumatoid arthritis treatment.        Vaginal candidiasis    Reports improvement with miconazole and diflucan. Candidiasis vs mucositis or both? I don't see KOH test.        Oral thrush    Symptomatic improvement with nystatin/diflucan and hurricane spray.         Neutropenia    Drug induced. Neutropenic precautions.improving.        Pancytopenia    Appreciate ID and hem recs. Hold MTX and plaquenil. Improving.        AICD (automatic cardioverter/defibrillator) present    No events. Stable.        Essential hypertension    Home metoprolol and ramipril held for hypotension.        Rheumatoid arthritis    Holding home meds        CAD (coronary artery disease)    No acute issue. Cont home meds.          VTE Risk Mitigation         Ordered     Medium Risk of VTE  Once      01/20/18 2347     Place CHANO hose  Until discontinued      01/20/18 2347     Place sequential compression device  Until discontinued      01/20/18 2347              Michel Woody MD  Department of Hospital Medicine   [facility]

## 2018-01-28 NOTE — PLAN OF CARE
Problem: Fall Risk (Adult)  Intervention: Safety Promotion/Fall Prevention   01/28/18 1554   Safety Interventions   Safety Promotion/Fall Prevention bed alarm set;assistive device/personal item within reach;side rails raised x 2;in recliner, wheels locked;medications reviewed;nonskid shoes/socks when out of bed;room near unit station   Patient will be free from fall as bed remains locked and in lowest position. Patient is instructed to  Call for assist prior to any transfers. Patient able to state understanding. Call light  In reach. Will monitor.

## 2018-01-29 VITALS
WEIGHT: 120.56 LBS | RESPIRATION RATE: 17 BRPM | OXYGEN SATURATION: 96 % | TEMPERATURE: 99 F | BODY MASS INDEX: 19.38 KG/M2 | HEART RATE: 74 BPM | HEIGHT: 66 IN | SYSTOLIC BLOOD PRESSURE: 123 MMHG | DIASTOLIC BLOOD PRESSURE: 54 MMHG

## 2018-01-29 LAB
ALBUMIN SERPL BCP-MCNC: 2 G/DL
ALP SERPL-CCNC: 69 U/L
ALT SERPL W/O P-5'-P-CCNC: 50 U/L
ANION GAP SERPL CALC-SCNC: 9 MMOL/L
AST SERPL-CCNC: 39 U/L
BASOPHILS # BLD AUTO: ABNORMAL K/UL
BASOPHILS NFR BLD: 1 %
BILIRUB SERPL-MCNC: 0.3 MG/DL
BUN SERPL-MCNC: 6 MG/DL
CALCIUM SERPL-MCNC: 8.6 MG/DL
CHLORIDE SERPL-SCNC: 100 MMOL/L
CO2 SERPL-SCNC: 28 MMOL/L
CREAT SERPL-MCNC: 0.7 MG/DL
DIFFERENTIAL METHOD: ABNORMAL
EOSINOPHIL # BLD AUTO: ABNORMAL K/UL
EOSINOPHIL NFR BLD: 0 %
ERYTHROCYTE [DISTWIDTH] IN BLOOD BY AUTOMATED COUNT: 16.1 %
EST. GFR  (AFRICAN AMERICAN): >60 ML/MIN/1.73 M^2
EST. GFR  (NON AFRICAN AMERICAN): >60 ML/MIN/1.73 M^2
GLUCOSE SERPL-MCNC: 109 MG/DL
HCT VFR BLD AUTO: 24.1 %
HGB BLD-MCNC: 8.4 G/DL
LYMPHOCYTES # BLD AUTO: ABNORMAL K/UL
LYMPHOCYTES NFR BLD: 18 %
MCH RBC QN AUTO: 31.2 PG
MCHC RBC AUTO-ENTMCNC: 34.9 G/DL
MCV RBC AUTO: 90 FL
METAMYELOCYTES NFR BLD MANUAL: 1 %
MONOCYTES # BLD AUTO: ABNORMAL K/UL
MONOCYTES NFR BLD: 16 %
NEUTROPHILS NFR BLD: 61 %
NEUTS BAND NFR BLD MANUAL: 3 %
PLATELET # BLD AUTO: 835 K/UL
PMV BLD AUTO: 10.6 FL
POCT GLUCOSE: 116 MG/DL (ref 70–110)
POCT GLUCOSE: 280 MG/DL (ref 70–110)
POTASSIUM SERPL-SCNC: 3.8 MMOL/L
PROT SERPL-MCNC: 5.1 G/DL
RBC # BLD AUTO: 2.69 M/UL
SODIUM SERPL-SCNC: 137 MMOL/L
WBC # BLD AUTO: 10.14 K/UL

## 2018-01-29 PROCEDURE — 80053 COMPREHEN METABOLIC PANEL: CPT

## 2018-01-29 PROCEDURE — 85027 COMPLETE CBC AUTOMATED: CPT

## 2018-01-29 PROCEDURE — 25000003 PHARM REV CODE 250: Performed by: NURSE PRACTITIONER

## 2018-01-29 PROCEDURE — 97116 GAIT TRAINING THERAPY: CPT

## 2018-01-29 PROCEDURE — 25000003 PHARM REV CODE 250: Performed by: HOSPITALIST

## 2018-01-29 PROCEDURE — 97110 THERAPEUTIC EXERCISES: CPT

## 2018-01-29 PROCEDURE — 85007 BL SMEAR W/DIFF WBC COUNT: CPT

## 2018-01-29 PROCEDURE — 36415 COLL VENOUS BLD VENIPUNCTURE: CPT

## 2018-01-29 RX ADMIN — CLOPIDOGREL BISULFATE 75 MG: 75 TABLET ORAL at 08:01

## 2018-01-29 RX ADMIN — ASPIRIN 325 MG: 325 TABLET, DELAYED RELEASE ORAL at 08:01

## 2018-01-29 RX ADMIN — METOPROLOL SUCCINATE 25 MG: 25 TABLET, EXTENDED RELEASE ORAL at 08:01

## 2018-01-29 RX ADMIN — PRAVASTATIN SODIUM 40 MG: 40 TABLET ORAL at 08:01

## 2018-01-29 RX ADMIN — RAMIPRIL 2.5 MG: 2.5 CAPSULE ORAL at 08:01

## 2018-01-29 RX ADMIN — MICONAZOLE NITRATE: 20 OINTMENT TOPICAL at 08:01

## 2018-01-29 RX ADMIN — FAMOTIDINE 20 MG: 20 TABLET, FILM COATED ORAL at 08:01

## 2018-01-29 NOTE — PT/OT/SLP PROGRESS
"Physical Therapy Treatment    Patient Name:  Ruthann Doe   MRN:  [id number]    Recommendations:     Discharge Recommendations:  home health PT (nursing home placement, per chart)   Discharge Equipment Recommendations: none   Barriers to discharge: Decreased caregiver support    Assessment:     Ruthann Doe is a 77 y.o. female admitted with a medical diagnosis of Diabetic ketoacidosis without coma associated with type 2 diabetes mellitus.  She presents with the following impairments/functional limitations:  weakness, impaired endurance, impaired functional mobilty, gait instability, impaired balance, decreased coordination, decreased safety awareness, impaired self care skills, decreased lower extremity function, impaired skin.  Pt is making gains with gait distance requiring decreased support.  Pt ambulated ~220ft with no AD, CGA with no LOB noted.    Rehab Prognosis:  good; patient would benefit from acute skilled PT services to address these deficits and reach maximum level of function.      Recent Surgery: * No surgery found *      Plan:     During this hospitalization, patient to be seen 6 x/week to address the above listed problems via gait training, therapeutic activities, therapeutic exercises  · Plan of Care Expires:  02/17/18   Plan of Care Reviewed with: patient    Subjective     Communicated with pt's nurse, Juliann, prior to session.  Patient found supine in bed upon PT entry to room, agreeable to treatment.      Chief Complaint: do we have to get up right now  Patient comments/goals: Pt states " Getting up to walk helps with my muscles."  Pain/Comfort:  · Pain Rating 1: 0/10    Patients cultural, spiritual, Sabianism conflicts given the current situation:      Objective:     Patient found with: peripheral IV     General Precautions: Standard, fall   Orthopedic Precautions:N/A   Braces: N/A     Functional Mobility:  · Bed Mobility:     · Scooting: supervision  · Supine to Sit: " supervision  · Transfers:     · Sit to Stand:  contact guard assistance with no AD  · Gait: Pt ambulated ~220ft with no AD, CGA with no LOB noted.  · Balance: pt requires SBA sitting EOB and CGA for static and dynamic standing      AM-PAC 6 CLICK MOBILITY  Turning over in bed (including adjusting bedclothes, sheets and blankets)?: 4  Sitting down on and standing up from a chair with arms (e.g., wheelchair, bedside commode, etc.): 3  Moving from lying on back to sitting on the side of the bed?: 3  Moving to and from a bed to a chair (including a wheelchair)?: 3  Need to walk in hospital room?: 3  Climbing 3-5 steps with a railing?: 3  Total Score: 19       Therapeutic Activities and Exercises:   Pt performed seated therex to BLEs x15 reps AROM including: hip flexion, LAQs, active hip abduction/adduction, heel raises, toe raises    Patient left reclined in BSchair with pressur relief cushion and B heels elevated with all lines intact, call button in reach and pt's nurse, Juliann, notified..    GOALS:    Physical Therapy Goals        Problem: Physical Therapy Goal    Goal Priority Disciplines Outcome Goal Variances Interventions   Physical Therapy Goal     PT/OT, PT Ongoing (interventions implemented as appropriate)     Description:  Goals to be met by: 18    Patient will increase functional independence with mobility by performin. Supine to sit with supervision  2. Sit to stand transfer with supervision  3. Gait  x 250 feet with Supervision using rolling walker if needed  4. Lower extremity exercise program x30 reps per handout, with supervision                      Time Tracking:     PT Received On: 18  PT Start Time: 1035     PT Stop Time: 1059  PT Total Time (min): 24 min     Billable Minutes: Gait Training 12 and Therapeutic Exercise 12    Treatment Type: Treatment  PT/PTA: PTA     PTA Visit Number: 3     Fern Ancar, PTA  2018

## 2018-01-29 NOTE — PROGRESS NOTES
Discussion had with CM Director, Chyna Jc, during which she advised that an EPS report should be made based on information obtained on admit regarding patient's home situation.  Information was shared with Deepthi JADE.  CICI followed up with ICU SW, Ariane QUEZADA, and requested that she assist with filing an EPS report.  This  followed up with [facility] Navigator, Anjali HARTMAN, to request that a  followup with patient post discharge.  [facility] Navigator will f/u.  Camelia Gardner LMSW, YARITZA-CICI, [facility]  1/29/2017

## 2018-01-29 NOTE — PROGRESS NOTES
Report made to Elder[employer] ([phone]).  SW spoke with Robyn Serra /EPS Intake. Intake advised that SW providing 3rd hand information and did not have direct contact with patient.   Information provided was from note of nurse,chrystal Breen, dated 1/21/2018.  Report  will be a non-accept as there is no other information other than than of dog feces being removed from home.  SW could not provide any additional information , e.g., patient's hospitalization due to home condition.  Camelia Gardner, ANGELO, ACM-SW, [facility]  1/29/2018

## 2018-01-29 NOTE — PLAN OF CARE
[facility]    HOME HEALTH ORDERS  FACE TO FACE ENCOUNTER    Patient Name: Ruthann Doe  YOB: 1940    PCP: Jesse Partida MD   PCP Address: [address] / [place] [address]  PCP Phone Number: [phone]  PCP Fax: [phone]    Encounter Date: 01/29/2018    Admit to Home Health    Diagnoses:  Active Hospital Problems    Diagnosis  POA    *Diabetic ketoacidosis without coma associated with type 2 diabetes mellitus [E11.10]  Yes    Hypokalemia [E87.6]  No    Pancytopenia [D61.818]  Yes    Neutropenia [D70.9]  Yes    Oral thrush [B37.0]  Yes    Vaginal candidiasis [B37.3]  Yes    Acquired immunocompromised state [D84.9]  Yes    Noncompliance with medication regimen [Z91.14]  Not Applicable    AICD (automatic cardioverter/defibrillator) present [Z95.810]  Yes    CAD (coronary artery disease) [I25.10]  Yes    Essential hypertension [I10]  Yes    Rheumatoid arthritis [M06.9]  Yes      Resolved Hospital Problems    Diagnosis Date Resolved POA   No resolved problems to display.       No future appointments.  Follow-up Information     Schedule an appointment as soon as possible for a visit  with Jesse Partida MD.    Specialty:  Internal Medicine  Contact information:  [address]  [place] [address]  [phone]             Jesse Partida MD In 1 week.    Specialty:  Internal Medicine  Contact information:  [address]  [place] [address]  [phone]                     I have seen and examined this patient face to face today. My clinical findings that support the need for the home health skilled services and home bound status are the following:  Weakness/numbness causing balance and gait disturbance due to Infection, Weakness/Debility and Anemia making it taxing to leave home.    Allergies:  Review of patient's allergies indicates:   Allergen Reactions    Sulfa (sulfonamide antibiotics) Hives     Percocet [oxycodone-acetaminophen] Nausea And Vomiting       Diet: diabetic diet: 2000 calorie    Activities: activity as tolerated    Nursing:   SN to complete comprehensive assessment including routine vital signs. Instruct on disease process and s/s of complications to report to MD. Review/verify medication list sent home with the patient at time of discharge  and instruct patient/caregiver as needed. Frequency may be adjusted depending on start of care date.    Notify MD if SBP > 160 or < 90; DBP > 90 or < 50; HR > 120 or < 50; Temp > 101; Other:         CONSULTS:    Physical Therapy to evaluate and treat. Evaluate for home safety and equipment needs; Establish/upgrade home exercise program. Perform / instruct on therapeutic exercises, gait training, transfer training, and Range of Motion.  Occupational Therapy to evaluate and treat. Evaluate home environment for safety and equipment needs. Perform/Instruct on transfers, ADL training, ROM, and therapeutic exercises.    MISCELLANEOUS CARE:  Routine Skin for Bedridden Patients: Instruct patient/caregiver to apply moisture barrier cream to all skin folds and wet areas in perineal area daily and after baths and all bowel movements.    WOUND CARE ORDERS  n/a      Medications: Review discharge medications with patient and family and provide education.      Current Discharge Medication List      START taking these medications    Details   lidocaine HCl 2% (LIDOCAINE VISCOUS) 2 % Soln 15ml every 3h as needed for oral pain  Qty: 100 mL, Refills: 0      nystatin (MYCOSTATIN) 100,000 unit/mL suspension Take 5 mLs (500,000 Units total) by mouth 4 (four) times daily. Continue for 2 days after end of symptoms  Qty: 200 mL, Refills: 0         CONTINUE these medications which have NOT CHANGED    Details   aspirin (ECOTRIN) 325 MG EC tablet Take 325 mg by mouth once daily.        clopidogrel (PLAVIX) 75 mg tablet Take 1 tablet (75 mg total) by mouth once daily.  Qty: 90 tablet,  Refills: 3      glimepiride (AMARYL) 1 MG tablet       metoprolol succinate (TOPROL-XL) 25 MG 24 hr tablet TAKE 1 TABLET BY MOUTH TWICE DAILY  Qty: 60 tablet, Refills: 0      multivitamin capsule Take 1 capsule by mouth once daily.        pravastatin (PRAVACHOL) 40 MG tablet Refills: 11      ramipril (ALTACE) 2.5 MG capsule TAKE ONE CAPSULE BY MOUTH EVERY DAY  Qty: 90 capsule, Refills: 1      tizanidine (ZANAFLEX) 2 MG tablet Take 2 tablets (4 mg total) by mouth every 6 (six) hours as needed.  Qty: 40 tablet, Refills: 2      vitamin D 185 MG Tab Take 1,000 mg by mouth once daily.           STOP taking these medications       fluconazole (DIFLUCAN) 150 MG Tab Comments:   Reason for Stopping:         hydrocodone-acetaminophen 5-325mg (NORCO) 5-325 mg per tablet Comments:   Reason for Stopping:         hydroxychloroquine (PLAQUENIL) 200 mg tablet Comments:   Reason for Stopping:         methotrexate 2.5 MG Tab Comments:   Reason for Stopping:         predniSONE (DELTASONE) 5 MG tablet Comments:   Reason for Stopping:               I certify that this patient is confined to her home and needs intermittent skilled nursing care, physical therapy and occupational therapy.

## 2018-01-29 NOTE — PLAN OF CARE
Problem: Diabetes, Type 2 (Adult)  Goal: Signs and Symptoms of Listed Potential Problems Will be Absent, Minimized or Managed (Diabetes, Type 2)  Signs and symptoms of listed potential problems will be absent, minimized or managed by discharge/transition of care (reference Diabetes, Type 2 (Adult) CPG).   Outcome: Ongoing (interventions implemented as appropriate)   01/29/18 0338   Diabetes, Type 2   Problems Assessed (Type 2 Diabetes) hyperglycemia   Received  coverage for blood glucose 261 mg/dl.    Problem: Fall Risk (Adult)  Goal: Absence of Falls  Patient will demonstrate the desired outcomes by discharge/transition of care.   Outcome: Ongoing (interventions implemented as appropriate)   01/29/18 0338   Fall Risk (Adult)   Absence of Falls making progress toward outcome   Instructed to call for assistance when needed.    Problem: Patient Care Overview  Goal: Plan of Care Review  Outcome: Ongoing (interventions implemented as appropriate)   01/29/18 0338   Coping/Psychosocial   Plan Of Care Reviewed With patient   Resting quietly during night, no report of pain, discomfort with cleaning vaginal area after voiding. Tolerating diet and drinking po fluids.

## 2018-01-29 NOTE — NURSING
Spoken with Deepthi  about not being able to get in contact with patient's daughter lisette. And she told me she try to contact her on her end. She did and called this writer shortly after and informed this  Writer that patient's daughter is waiting on her spouse to get home and she will then return to the hospital to transfer patient home. Safety maintained, will monitor.

## 2018-01-29 NOTE — NURSING
Discharge teaching provided to patient and daughter (lisette) both stated understanding. Patient was instructed to follow up with PCP in one week. Teaching provided on how to perform better skin care and what s/s to  Look out for with  Thrush and fungus. Patient stated understanding. Discharge paper provided to patient and safely assisted patient down to daughter's vehicle where they safely left the facility.

## 2018-01-29 NOTE — PLAN OF CARE
01/29/18 1225   Right Care Referral Info   Post Acute Recommendation Home-care   Referral Type (home health )   Facility Name ([facility])

## 2018-01-29 NOTE — NURSING
Attempted to contact patient's daughter Lisette in regards to patient's discharge and transportation arrangement. Left message for lisette. This writer spoke with  Patient's brother and asked him if should get in touch with lisette, to please have her this this writer a call back. Meanwhile, patient have been by PT staff ambulated and performed some exercises activities and is now sitting up in recliner chair. Resp. Even non-labored no acute distress noted. Able to make needs known. Denies pain and discomfort at present time.safety maintained. Chair locked and call light in reach. Will monitor.

## 2018-01-29 NOTE — PLAN OF CARE
Problem: Physical Therapy Goal  Goal: Physical Therapy Goal  Goals to be met by: 18    Patient will increase functional independence with mobility by performin. Supine to sit with supervision  2. Sit to stand transfer with supervision  3. Gait  x 250 feet with Supervision using rolling walker if needed  4. Lower extremity exercise program x30 reps per handout, with supervision     Outcome: Ongoing (interventions implemented as appropriate)  Pt is making gains with gait distance requiring decreased support.  Pt ambulated ~220ft with no AD, CGA with no LOB noted.

## 2018-01-29 NOTE — PROGRESS NOTES
Orders for home health sent via [facility] to [facility] via fax at [phone].  TN indicated that the pt will be discharging to home on today and requested to be contacted once arranged pt had [facility] and [facility] in the past and pt is fine with either.      1043- call placed to pts nurse Juliann to notify her that all arrangements have been made and that from CM standpoint she may proceed with nursing d/c process.    1110- call received from pts nurse Juliann stating that she has not had any success in getting in contact with pts family to come and pick the pt up despite Dr Braden speaking with pts daughter Ayse who informed him that she was going to come pick her up at time of discharge.    1111- call placed to pts daughter Ayse at [phone] via [place] personal cell as no answer from hospital line. Ayse notified that the pt is ready to be picked up as per earlier conversation with MD.  Ayse states that she thought that it would be later today and that she is making a grocery list for the pt to stock her home but that as soon as her  returns home she and he will be on their way to the hospital to  the pt. No information provided to [place] in regards to pt not being able to return to home for any reasons.    1112- call placed to pts nurse Juliann to notify her of conversation with Ayse pts daughter and that she should be here with next 1-2 hours to pick pt up .    1250- attempted to contact pts daughter to discuss pts home/discharge.  No answer at this time detailed message left and will continue to follow for call back.

## 2018-01-30 NOTE — PT/OT/SLP DISCHARGE
Physical Therapy Discharge Summary    Name: Ruthann Doe  MRN: [id number]   Principal Problem: Diabetic ketoacidosis without coma associated with type 2 diabetes mellitus     Patient Discharged from acute Physical Therapy on 18.  Please refer to prior PT noted date on 18 for functional status.     Assessment:     Goals partially met. Patient appropriate for care in another setting.    Objective:     GOALS:    Physical Therapy Goals        Problem: Physical Therapy Goal    Goal Priority Disciplines Outcome Goal Variances Interventions   Physical Therapy Goal     PT/OT, PT Unable to achieve outcome(s) by discharge     Description:  Goals to be met by: 18    Patient will increase functional independence with mobility by performin. Supine to sit with supervision  2. Sit to stand transfer with supervision  3. Gait  x 250 feet with Supervision using rolling walker if needed  4. Lower extremity exercise program x30 reps per handout, with supervision                      Reasons for Discontinuation of Therapy Services  Transfer to alternate level of care.      Plan:     Patient Discharged to: Home with Home Health Service.    Genny Lindsey, PT  2018

## 2018-01-31 ENCOUNTER — PATIENT OUTREACH (OUTPATIENT)
Dept: ADMINISTRATIVE | Facility: CLINIC | Age: 78
End: 2018-01-31

## 2018-02-09 ENCOUNTER — HOSPITAL ENCOUNTER (OUTPATIENT)
Dept: RADIOLOGY | Facility: HOSPITAL | Age: 78
Discharge: HOME | End: 2018-02-09
Attending: INTERNAL MEDICINE
Payer: MEDICARE

## 2018-02-09 DIAGNOSIS — Z00.00 ROUTINE GENERAL MEDICAL EXAMINATION AT A HEALTH CARE FACILITY: Primary | ICD-10-CM

## 2018-02-09 DIAGNOSIS — Z00.00 ROUTINE GENERAL MEDICAL EXAMINATION AT A HEALTH CARE FACILITY: ICD-10-CM

## 2018-02-09 PROCEDURE — 71046 X-RAY EXAM CHEST 2 VIEWS: CPT | Mod: 26,,, | Performed by: RADIOLOGY

## 2018-02-09 PROCEDURE — 71046 X-RAY EXAM CHEST 2 VIEWS: CPT | Mod: TC,FY

## 2018-07-02 ENCOUNTER — HOSPITAL ENCOUNTER (EMERGENCY)
Facility: HOSPITAL | Age: 78
Discharge: HOME | End: 2018-07-02
Attending: EMERGENCY MEDICINE
Payer: MEDICARE

## 2018-07-02 VITALS
DIASTOLIC BLOOD PRESSURE: 73 MMHG | RESPIRATION RATE: 18 BRPM | BODY MASS INDEX: 20.89 KG/M2 | OXYGEN SATURATION: 100 % | HEART RATE: 64 BPM | SYSTOLIC BLOOD PRESSURE: 175 MMHG | HEIGHT: 66 IN | TEMPERATURE: 99 F | WEIGHT: 130 LBS

## 2018-07-02 DIAGNOSIS — R42 DIZZINESS: Primary | ICD-10-CM

## 2018-07-02 DIAGNOSIS — M25.552 LEFT HIP PAIN: ICD-10-CM

## 2018-07-02 DIAGNOSIS — R53.1 WEAKNESS: ICD-10-CM

## 2018-07-02 DIAGNOSIS — R42 LIGHTHEADEDNESS: ICD-10-CM

## 2018-07-02 LAB
ALBUMIN SERPL BCP-MCNC: 3.6 G/DL
ALP SERPL-CCNC: 77 U/L
ALT SERPL W/O P-5'-P-CCNC: 8 U/L
AMORPH CRY URNS QL MICRO: ABNORMAL
ANION GAP SERPL CALC-SCNC: 8 MMOL/L
AST SERPL-CCNC: 13 U/L
BACTERIA #/AREA URNS HPF: ABNORMAL /HPF
BASOPHILS # BLD AUTO: 0.03 K/UL
BASOPHILS NFR BLD: 0.4 %
BILIRUB SERPL-MCNC: 1 MG/DL
BILIRUB UR QL STRIP: NEGATIVE
BUN SERPL-MCNC: 17 MG/DL
CALCIUM SERPL-MCNC: 9.8 MG/DL
CHLORIDE SERPL-SCNC: 106 MMOL/L
CLARITY UR: CLEAR
CO2 SERPL-SCNC: 24 MMOL/L
COLOR UR: YELLOW
CREAT SERPL-MCNC: 1.2 MG/DL
DIFFERENTIAL METHOD: ABNORMAL
EOSINOPHIL # BLD AUTO: 0.2 K/UL
EOSINOPHIL NFR BLD: 3 %
ERYTHROCYTE [DISTWIDTH] IN BLOOD BY AUTOMATED COUNT: 15 %
EST. GFR  (AFRICAN AMERICAN): 50 ML/MIN/1.73 M^2
EST. GFR  (NON AFRICAN AMERICAN): 44 ML/MIN/1.73 M^2
GLUCOSE SERPL-MCNC: 190 MG/DL
GLUCOSE UR QL STRIP: NEGATIVE
HCT VFR BLD AUTO: 29.8 %
HGB BLD-MCNC: 10.1 G/DL
HGB UR QL STRIP: ABNORMAL
HYALINE CASTS #/AREA URNS LPF: 0 /LPF
KETONES UR QL STRIP: NEGATIVE
LEUKOCYTE ESTERASE UR QL STRIP: ABNORMAL
LYMPHOCYTES # BLD AUTO: 1.4 K/UL
LYMPHOCYTES NFR BLD: 20.3 %
MAGNESIUM SERPL-MCNC: 1.8 MG/DL
MCH RBC QN AUTO: 29.4 PG
MCHC RBC AUTO-ENTMCNC: 33.9 G/DL
MCV RBC AUTO: 87 FL
MICROSCOPIC COMMENT: ABNORMAL
MONOCYTES # BLD AUTO: 0.6 K/UL
MONOCYTES NFR BLD: 8 %
NEUTROPHILS # BLD AUTO: 4.7 K/UL
NEUTROPHILS NFR BLD: 68.3 %
NITRITE UR QL STRIP: NEGATIVE
NON-SQ EPI CELLS #/AREA URNS HPF: 1 /HPF
PH UR STRIP: 5 [PH] (ref 5–8)
PLATELET # BLD AUTO: 235 K/UL
PMV BLD AUTO: 9.5 FL
POTASSIUM SERPL-SCNC: 4.5 MMOL/L
PROT SERPL-MCNC: 6.1 G/DL
PROT UR QL STRIP: ABNORMAL
RBC # BLD AUTO: 3.44 M/UL
RBC #/AREA URNS HPF: 2 /HPF (ref 0–4)
SODIUM SERPL-SCNC: 138 MMOL/L
SP GR UR STRIP: 1.01 (ref 1–1.03)
SQUAMOUS #/AREA URNS HPF: 6 /HPF
TROPONIN I SERPL DL<=0.01 NG/ML-MCNC: <0.006 NG/ML
URN SPEC COLLECT METH UR: ABNORMAL
UROBILINOGEN UR STRIP-ACNC: NEGATIVE EU/DL
WBC # BLD AUTO: 6.89 K/UL
WBC #/AREA URNS HPF: 8 /HPF (ref 0–5)

## 2018-07-02 PROCEDURE — 96360 HYDRATION IV INFUSION INIT: CPT

## 2018-07-02 PROCEDURE — 81000 URINALYSIS NONAUTO W/SCOPE: CPT

## 2018-07-02 PROCEDURE — 99284 EMERGENCY DEPT VISIT MOD MDM: CPT | Mod: 25

## 2018-07-02 PROCEDURE — 84484 ASSAY OF TROPONIN QUANT: CPT

## 2018-07-02 PROCEDURE — 83735 ASSAY OF MAGNESIUM: CPT

## 2018-07-02 PROCEDURE — 96361 HYDRATE IV INFUSION ADD-ON: CPT

## 2018-07-02 PROCEDURE — 85025 COMPLETE CBC W/AUTO DIFF WBC: CPT

## 2018-07-02 PROCEDURE — 80053 COMPREHEN METABOLIC PANEL: CPT

## 2018-07-02 PROCEDURE — 25000003 PHARM REV CODE 250: Performed by: PHYSICIAN ASSISTANT

## 2018-07-02 RX ADMIN — SODIUM CHLORIDE 500 ML: 0.9 INJECTION, SOLUTION INTRAVENOUS at 02:07

## 2018-07-02 NOTE — DISCHARGE INSTRUCTIONS
Lots of liquids.  Please return immediately if you get worse or if new problems develop.  Please follow-up with her primary care doctor next week.  Please call your cardiologist for an appointment this week.  Please call in the morning.  Return immediately if you get worse or if new problems develop.  Walk with a walker.

## 2018-07-02 NOTE — ED TRIAGE NOTES
Presented to ed via ambulance. Pt. Reported she woke up and was in the kitchen trying to fix her self breakfast then she felt dizziness and weakness,  She reported she did not fall, she fell yesterday.

## 2018-07-02 NOTE — ED PROVIDER NOTES
"Encounter Date: 2018    SCRIBE #1 NOTE: I, Vincent Frias, am scribing for, and in the presence of,  Jesse Vinson MD. I have scribed the following portions of the note - Other sections scribed: HPI, ROS.       History     Chief Complaint   Patient presents with    Dizziness     and weakness since this AM     CC: Dizziness    HPI: This 78 y.o. Female with arthritis, CAD, diabetes mellitus type 2, pacemaker, pneumonia, renal insufficiency and stents in her heart presents to the ED c/o weakness, dizziness and L hip soreness which began x3.5 hours PTA. Pt reports falling x2 days ago. She states she got "really weak like she was going to pass out." Pt feels off balance. She complies with at home meds. Pt denies fever, chills, abdominal pain, nausea, CHF and thyroid disease.      The history is provided by the patient. No  was used.     Review of patient's allergies indicates:   Allergen Reactions    Sulfa (sulfonamide antibiotics) Hives    Percocet [oxycodone-acetaminophen] Nausea And Vomiting     Past Medical History:   Diagnosis Date    Arthritis     Rheumatoid  arthritis    Cardiac arrhythmia     Coronary artery disease     Diabetes mellitus type II     Pneumonia     as a kid, with empyema    Renal insufficiency     Rheumatoid arthritis(714.0)      Past Surgical History:   Procedure Laterality Date    CARDIAC DEFIBRILLATOR PLACEMENT      CORONARY ARTERY BYPASS GRAFT       4 para 3       HYSTERECTOMY      osteomyelitis      TONSILLECTOMY      TOTAL KNEE ARTHROPLASTY      TOTAL SHOULDER ARTHROPLASTY       Family History   Problem Relation Age of Onset    Heart disease Mother     Diabetes Mother     Cancer Maternal Uncle      Social History   Substance Use Topics    Smoking status: Never Smoker    Smokeless tobacco: Never Used      Comment: Lives with son, extended family.  Three children.    Alcohol use Yes      Comment: only during holidays     Review of Systems "   Constitutional: Negative for chills and fever.   HENT: Negative for ear pain, rhinorrhea and sore throat.    Eyes: Negative for redness.   Respiratory: Negative for shortness of breath.    Cardiovascular: Negative for chest pain.   Gastrointestinal: Negative for abdominal pain, diarrhea, nausea and vomiting.   Genitourinary: Negative for dysuria and hematuria.   Musculoskeletal: Positive for arthralgias (L hip soreness). Negative for back pain and neck pain.   Skin: Negative for rash.   Neurological: Positive for dizziness and weakness. Negative for numbness and headaches.   Hematological: Does not bruise/bleed easily.   Psychiatric/Behavioral: The patient is not nervous/anxious.        Physical Exam     Initial Vitals [07/02/18 1150]   BP Pulse Resp Temp SpO2   (!) 109/55 74 18 98.5 °F (36.9 °C) 98 %      MAP       --         Physical Exam  The patient was examined specifically for the following:   General:No significant distress, Good color, Warm and dry. Head and neck:Scalp atraumatic, Neck supple. Neurological:Appropriate conversation, Gross motor deficits. Eyes:Conjugate gaze, Clear corneas. ENT: No epistaxis. Cardiac: Regular rate and rhythm, Grossly normal heart tones. Pulmonary: Wheezing, Rales. Gastrointestinal: Abdominal tenderness, Abdominal distention. Musculoskeletal: Extremity deformity, Apparent pain with range of motion of the joints. Skin: Rash.   The findings on examination were normal except for the following:  Patient seems pale.  The lungs are clear and free of wheezing rales or rhonchi.  Heart tones are normal.  The patient has regular rate and rhythm.  There is a pacer in the left chest.  The patient seems little forgetful.  There is tenderness and pain with range of motion of the left hip.  Vital signs appear to be stable.  The patient is afebrile.  ED Course   Procedures  Labs Reviewed   CBC W/ AUTO DIFFERENTIAL - Abnormal; Notable for the following:        Result Value    RBC 3.44 (*)      Hemoglobin 10.1 (*)     Hematocrit 29.8 (*)     RDW 15.0 (*)     All other components within normal limits   COMPREHENSIVE METABOLIC PANEL - Abnormal; Notable for the following:     Glucose 190 (*)     ALT 8 (*)     eGFR if  50 (*)     eGFR if non  44 (*)     All other components within normal limits   URINALYSIS, REFLEX TO URINE CULTURE - Abnormal; Notable for the following:     Protein, UA 1+ (*)     Occult Blood UA 1+ (*)     Leukocytes, UA 2+ (*)     All other components within normal limits    Narrative:     You may catheterize this patient has required  Preferred Collection Type->Urine, Clean Catch   URINALYSIS MICROSCOPIC - Abnormal; Notable for the following:     WBC, UA 8 (*)     Bacteria, UA Few (*)     Non-Squam Epith 1 (*)     All other components within normal limits    Narrative:     You may catheterize this patient has required  Preferred Collection Type->Urine, Clean Catch   MAGNESIUM   TROPONIN I     EKG Readings: (Independently Interpreted)   This patient is in a sinus rhythm with a heart rate of 64.  There are nonspecific ST segment and T-wave changes.  There is poor R-wave progression across the precordium.  There is no definite evidence of acute myocardial infarction or malignant arrhythmia.       Imaging Results          X-Ray Hip 2 View Left (Final result)  Result time 07/02/18 17:21:18    Final result by Lizandro Green MD (07/02/18 17:21:18)                 Impression:      No definite acute fracture identified.  Subtle contour change at left femoral neck similar to old exam, could correlate with a prior fracture.  If there is strong clinical suspicion of recent hip fracture, consider CT for further assessment.    Chronic findings including DJD of the SI joints, surgical clip in pelvis, and femoral artery atherosclerosis.      Electronically signed by: Lizandro Green MD  Date:    07/02/2018  Time:    17:21             Narrative:    EXAMINATION:  XR HIP  2 VIEW LEFT    CLINICAL HISTORY:  Pain in left hip    TECHNIQUE:  AP view of the pelvis and frog leg lateral view of the left hip were performed.    COMPARISON:  10/21/2015    FINDINGS:  The joint spaces at the hips are mildly narrowed consistent with degenerative joint disease. Right hip appears intact. Left hip shows a subtle contour change at the junction of the lateral cortex of the femoral neck and head. This is similar to the exam almost 3 years ago, could correlate with prior fracture without significant displacement. No definite acute fracture is seen.  Surgical clip is present in the pelvis. Mild degenerative change is noted at the SI joints. Vascular calcifications are present bilaterally.                               X-Ray Chest 1 View (Final result)  Result time 07/02/18 14:05:11    Final result by Edwar Ladd MD (07/02/18 14:05:11)                 Impression:      1. No acute cardiopulmonary process.      Electronically signed by: Edwar Ladd MD  Date:    07/02/2018  Time:    14:05             Narrative:    EXAMINATION:  XR CHEST 1 VIEW    CLINICAL HISTORY:  Dizziness and giddiness    TECHNIQUE:  Single frontal view of the chest was performed.    COMPARISON:  02/09/2018    FINDINGS:  The cardiomediastinal silhouette is prominent, similar to the previous exam noting postsurgical changes and calcification of the aorta.  Left chest wall pacer noted..  There is no pleural effusion.  The trachea is midline.  The lungs are symmetrically expanded bilaterally with minimal left basilar subsegmental atelectasis..  No large focal consolidation seen.  There is no pneumothorax.  The osseous structures are remarkable for degenerative changes..  Postsurgical change overlies the right upper quadrant.                                Medical decision making:  Given the above, this patient presents to the emergency room with a history of feeling weak and dizzy this morning.  Laboratory evaluation is essentially  unremarkable except for a mild anemia.  The patient had borderline low blood pressure on arrival here.  She was rehydrated in the emergency room.  Her blood pressure is up to 167/90.  I had this patient stand and walk.  She had a mild left limp.  She had a fall 2 days ago.  She has been walking.  She has a walker at home.  She had some very slight disequilibrium she walks now with a slight left limp.  The pacemaker was interrogated.  The patient had no events.  I treated this patient with normal saline.  She was comfortable.  I could not find any further therapy that I could provide.  I discussed the case with Dr. Wilson, who has not seen the patient for a long time.  He would like to see her in the office.  The patient fell on her left hip.  I see no evidence of acute hip fracture.  I will discharge this patient to outpatient evaluation and treatment.                Scribe Attestation:   Scribe #1: I performed the above scribed service and the documentation accurately describes the services I performed. I attest to the accuracy of the note.    Attending Attestation:           Physician Attestation for Scribe:  Physician Attestation Statement for Scribe #1: I, Jesse Vinson MD, reviewed documentation, as scribed by Vincent Frias in my presence, and it is both accurate and complete.                    Clinical Impression:   The primary encounter diagnosis was Dizziness. Diagnoses of Lightheadedness, Left hip pain, and Weakness were also pertinent to this visit.                             Jesse Vinson MD  07/02/18 7100

## 2018-07-05 ENCOUNTER — OFFICE VISIT (OUTPATIENT)
Dept: CARDIOLOGY | Facility: CLINIC | Age: 78
End: 2018-07-05
Payer: MEDICARE

## 2018-07-05 VITALS
WEIGHT: 130.06 LBS | RESPIRATION RATE: 16 BRPM | OXYGEN SATURATION: 99 % | BODY MASS INDEX: 20.99 KG/M2 | SYSTOLIC BLOOD PRESSURE: 117 MMHG | DIASTOLIC BLOOD PRESSURE: 47 MMHG | HEART RATE: 63 BPM

## 2018-07-05 DIAGNOSIS — Z95.810 AICD (AUTOMATIC CARDIOVERTER/DEFIBRILLATOR) PRESENT: ICD-10-CM

## 2018-07-05 DIAGNOSIS — Z91.89 AT RISK FOR CORONARY ARTERY DISEASE: ICD-10-CM

## 2018-07-05 DIAGNOSIS — E11.21 CONTROLLED TYPE 2 DIABETES MELLITUS WITH DIABETIC NEPHROPATHY, WITHOUT LONG-TERM CURRENT USE OF INSULIN: ICD-10-CM

## 2018-07-05 DIAGNOSIS — R55 SYNCOPE, UNSPECIFIED SYNCOPE TYPE: ICD-10-CM

## 2018-07-05 DIAGNOSIS — I25.10 CORONARY ARTERY DISEASE INVOLVING NATIVE CORONARY ARTERY OF NATIVE HEART WITHOUT ANGINA PECTORIS: Primary | ICD-10-CM

## 2018-07-05 DIAGNOSIS — E78.5 DYSLIPIDEMIA: ICD-10-CM

## 2018-07-05 DIAGNOSIS — I10 ESSENTIAL HYPERTENSION: ICD-10-CM

## 2018-07-05 DIAGNOSIS — I70.0 ATHEROSCLEROSIS OF AORTA: ICD-10-CM

## 2018-07-05 PROCEDURE — 99204 OFFICE O/P NEW MOD 45 MIN: CPT | Mod: S$GLB,,, | Performed by: INTERNAL MEDICINE

## 2018-07-05 PROCEDURE — 3078F DIAST BP <80 MM HG: CPT | Mod: CPTII,S$GLB,, | Performed by: INTERNAL MEDICINE

## 2018-07-05 PROCEDURE — 3074F SYST BP LT 130 MM HG: CPT | Mod: CPTII,S$GLB,, | Performed by: INTERNAL MEDICINE

## 2018-07-05 PROCEDURE — 99999 PR PBB SHADOW E&M-EST. PATIENT-LVL III: CPT | Mod: PBBFAC,,, | Performed by: INTERNAL MEDICINE

## 2018-07-05 NOTE — PROGRESS NOTES
Subjective:    Patient ID:  Ruthann Doe is a 78 y.o. female who presents for follow-up of Hospital Follow Up      HPI  Patient is here for follow-up of recent ER visit for syncope.  This is my former patient from [facility].  She says she's been feeling significant lightheadedness and dizziness.  She then had a mechanical fall after this and lost consciousness.  She did not have any chest pain since she had a bypass surgery.  She's had some shortness of breath on heavier exertion but relieved with rest.  She appears to have had cognitive decline and mention that she cannot remember the things she used to.  She cannot recall as well in the last time she saw a cardiologist.  She currently denies any PND, orthopnea or lower edema.  She's had on and off dizziness the point of presyncope and syncope prior.  Again she somewhat of a poor historian with definite cognitive decline since we last saw her.    Review of Systems   Constitution: Negative.   HENT: Negative.    Eyes: Negative.    Cardiovascular: Positive for dyspnea on exertion and syncope. Negative for chest pain, irregular heartbeat, leg swelling, near-syncope, orthopnea, palpitations and paroxysmal nocturnal dyspnea.   Respiratory: Negative for shortness of breath.    Skin: Negative.    Musculoskeletal: Negative.    Gastrointestinal: Negative for abdominal pain, constipation and diarrhea.   Genitourinary: Negative for dysuria.   Psychiatric/Behavioral: Negative.      Past Medical History:   Diagnosis Date    Arthritis     Rheumatoid  arthritis    Cardiac arrhythmia     Coronary artery disease     Diabetes mellitus type II     Pneumonia     as a kid, with empyema    Renal insufficiency     Rheumatoid arthritis(714.0)      Past Surgical History:   Procedure Laterality Date    CARDIAC DEFIBRILLATOR PLACEMENT      CORONARY ARTERY BYPASS GRAFT       4 para 3       HYSTERECTOMY      osteomyelitis      TONSILLECTOMY      TOTAL  KNEE ARTHROPLASTY      TOTAL SHOULDER ARTHROPLASTY       Social History   Substance Use Topics    Smoking status: Never Smoker    Smokeless tobacco: Never Used      Comment: Lives with son, extended family.  Three children.    Alcohol use Yes      Comment: only during holidays     Family History   Problem Relation Age of Onset    Heart disease Mother     Diabetes Mother     Cancer Maternal Uncle         Objective:    Physical Exam   Constitutional: She is oriented to person, place, and time. She appears well-developed and well-nourished.   HENT:   Head: Normocephalic and atraumatic.   Eyes: Conjunctivae and EOM are normal. Pupils are equal, round, and reactive to light.   Neck: Normal range of motion. Neck supple. No thyromegaly present.   Cardiovascular: Normal rate and regular rhythm.    No murmur heard.  Pulmonary/Chest: Effort normal and breath sounds normal. No respiratory distress.   Abdominal: Soft. Bowel sounds are normal.   Musculoskeletal: She exhibits no edema.   Neurological: She is alert and oriented to person, place, and time.   Skin: Skin is warm and dry.   Psychiatric: She has a normal mood and affect. Her behavior is normal.           Assessment:       1. Coronary artery disease involving native coronary artery of native heart without angina pectoris    2. Essential hypertension    3. AICD (automatic cardioverter/defibrillator) present    4. Controlled type 2 diabetes mellitus with diabetic nephropathy, without long-term current use of insulin    5. Dyslipidemia    6. Atherosclerosis of aorta    7. At risk for coronary artery disease    8. Syncope, unspecified syncope type         Plan:       -Continue med therapy  -Get records from Dr. Staton  -Appears to have some cognitive issues possibly leading to med noncompliance  -Follow-up device checks  -Plan for baseline testing including echo, stress, Holter and carotid    Return to clinic in one month with testing ASAP

## 2018-07-27 ENCOUNTER — HOSPITAL ENCOUNTER (OUTPATIENT)
Dept: RADIOLOGY | Facility: HOSPITAL | Age: 78
Discharge: HOME | End: 2018-07-27
Attending: INTERNAL MEDICINE
Payer: MEDICARE

## 2018-07-27 ENCOUNTER — HOSPITAL ENCOUNTER (OUTPATIENT)
Dept: CARDIOLOGY | Facility: HOSPITAL | Age: 78
Discharge: HOME | End: 2018-07-27
Attending: INTERNAL MEDICINE
Payer: MEDICARE

## 2018-07-27 DIAGNOSIS — I25.10 CORONARY ARTERY DISEASE INVOLVING NATIVE CORONARY ARTERY OF NATIVE HEART WITHOUT ANGINA PECTORIS: ICD-10-CM

## 2018-07-27 DIAGNOSIS — Z91.89 AT RISK FOR CORONARY ARTERY DISEASE: ICD-10-CM

## 2018-07-27 DIAGNOSIS — R55 SYNCOPE, UNSPECIFIED SYNCOPE TYPE: ICD-10-CM

## 2018-07-27 LAB
AORTIC VALVE REGURGITATION: ABNORMAL
DIASTOLIC DYSFUNCTION: NO
DIASTOLIC DYSFUNCTION: YES
ESTIMATED PA SYSTOLIC PRESSURE: 56.29
INTERNAL CAROTID STENOSIS: NORMAL
MITRAL VALVE REGURGITATION: ABNORMAL
RETIRED EF AND QEF - SEE NOTES: 25 (ref 55–65)
TRICUSPID VALVE REGURGITATION: ABNORMAL

## 2018-07-27 PROCEDURE — 78452 HT MUSCLE IMAGE SPECT MULT: CPT | Mod: 26,,, | Performed by: INTERNAL MEDICINE

## 2018-07-27 PROCEDURE — 93018 CV STRESS TEST I&R ONLY: CPT | Mod: ,,, | Performed by: INTERNAL MEDICINE

## 2018-07-27 PROCEDURE — 93225 XTRNL ECG REC<48 HRS REC: CPT

## 2018-07-27 PROCEDURE — 63600175 PHARM REV CODE 636 W HCPCS

## 2018-07-27 PROCEDURE — 93017 CV STRESS TEST TRACING ONLY: CPT

## 2018-07-27 PROCEDURE — 93306 TTE W/DOPPLER COMPLETE: CPT

## 2018-07-27 PROCEDURE — 93880 EXTRACRANIAL BILAT STUDY: CPT

## 2018-07-27 PROCEDURE — 78452 HT MUSCLE IMAGE SPECT MULT: CPT | Mod: TC

## 2018-07-27 PROCEDURE — 93306 TTE W/DOPPLER COMPLETE: CPT | Mod: 26,,, | Performed by: INTERNAL MEDICINE

## 2018-07-27 PROCEDURE — 93227 XTRNL ECG REC<48 HR R&I: CPT | Mod: ,,, | Performed by: INTERNAL MEDICINE

## 2018-07-27 PROCEDURE — 93880 EXTRACRANIAL BILAT STUDY: CPT | Mod: 26,,, | Performed by: INTERNAL MEDICINE

## 2018-07-27 PROCEDURE — 93016 CV STRESS TEST SUPVJ ONLY: CPT | Mod: ,,, | Performed by: INTERNAL MEDICINE

## 2018-07-27 RX ORDER — NITROGLYCERIN 0.4 MG/1
TABLET SUBLINGUAL
Status: DISPENSED
Start: 2018-07-27 | End: 2018-07-27

## 2018-07-27 RX ORDER — REGADENOSON 0.08 MG/ML
INJECTION, SOLUTION INTRAVENOUS
Status: DISPENSED
Start: 2018-07-27 | End: 2018-07-27

## 2018-08-20 ENCOUNTER — OFFICE VISIT (OUTPATIENT)
Dept: CARDIOLOGY | Facility: CLINIC | Age: 78
End: 2018-08-20
Payer: MEDICARE

## 2018-08-20 VITALS
RESPIRATION RATE: 20 BRPM | HEART RATE: 76 BPM | SYSTOLIC BLOOD PRESSURE: 122 MMHG | WEIGHT: 109 LBS | BODY MASS INDEX: 17.6 KG/M2 | OXYGEN SATURATION: 99 % | DIASTOLIC BLOOD PRESSURE: 76 MMHG

## 2018-08-20 DIAGNOSIS — E11.21 CONTROLLED TYPE 2 DIABETES MELLITUS WITH DIABETIC NEPHROPATHY, WITHOUT LONG-TERM CURRENT USE OF INSULIN: ICD-10-CM

## 2018-08-20 DIAGNOSIS — I10 ESSENTIAL HYPERTENSION: ICD-10-CM

## 2018-08-20 DIAGNOSIS — E78.5 DYSLIPIDEMIA: ICD-10-CM

## 2018-08-20 DIAGNOSIS — I70.0 ATHEROSCLEROSIS OF AORTA: ICD-10-CM

## 2018-08-20 DIAGNOSIS — I25.10 CORONARY ARTERY DISEASE INVOLVING NATIVE CORONARY ARTERY OF NATIVE HEART WITHOUT ANGINA PECTORIS: Primary | ICD-10-CM

## 2018-08-20 DIAGNOSIS — R55 SYNCOPE, UNSPECIFIED SYNCOPE TYPE: ICD-10-CM

## 2018-08-20 DIAGNOSIS — Z95.810 AICD (AUTOMATIC CARDIOVERTER/DEFIBRILLATOR) PRESENT: ICD-10-CM

## 2018-08-20 PROCEDURE — 99214 OFFICE O/P EST MOD 30 MIN: CPT | Mod: S$GLB,,, | Performed by: INTERNAL MEDICINE

## 2018-08-20 PROCEDURE — 3078F DIAST BP <80 MM HG: CPT | Mod: CPTII,S$GLB,, | Performed by: INTERNAL MEDICINE

## 2018-08-20 PROCEDURE — 99999 PR PBB SHADOW E&M-EST. PATIENT-LVL III: CPT | Mod: PBBFAC,,, | Performed by: INTERNAL MEDICINE

## 2018-08-20 PROCEDURE — 3074F SYST BP LT 130 MM HG: CPT | Mod: CPTII,S$GLB,, | Performed by: INTERNAL MEDICINE

## 2018-08-20 NOTE — PROGRESS NOTES
Subjective:    Patient ID:  Ruthann Doe is a 78 y.o. female who presents for follow-up of Results      HPI   Previous history:  Patient is here for follow-up of recent ER visit for syncope.  This is my former patient from [facility].  She says she's been feeling significant lightheadedness and dizziness.  She then had a mechanical fall after this and lost consciousness.  She did not have any chest pain since she had a bypass surgery.  She's had some shortness of breath on heavier exertion but relieved with rest.  She appears to have had cognitive decline and mention that she cannot remember the things she used to.  She cannot recall as well in the last time she saw a cardiologist.  She currently denies any PND, orthopnea or lower edema.  She's had on and off dizziness the point of presyncope and syncope prior.  Again she somewhat of a poor historian with definite cognitive decline since we last saw her.    Today:  Here follow-up coronary artery disease and systolic heart failure.  She looks good and feels much better today.  She says that she intermittently has cognitive issues.  Today's a good day where she has no significant problems.  She denies any chest pain, shortness of breath or palpitations.  On bad days she just can't think of what to do and still currently lives by herself taking care of her own house.  She denies any PND, orthopnea or lower extremity edema.  She has not experiencing dizziness, presyncope or syncope.      Review of Systems   Constitution: Negative.   HENT: Negative.    Eyes: Negative.    Cardiovascular: Positive for dyspnea on exertion and syncope. Negative for chest pain, irregular heartbeat, leg swelling, near-syncope, orthopnea, palpitations and paroxysmal nocturnal dyspnea.   Respiratory: Negative for shortness of breath.    Skin: Negative.    Musculoskeletal: Negative.    Gastrointestinal: Negative for abdominal pain, constipation and diarrhea.   Genitourinary:  Negative for dysuria.   Psychiatric/Behavioral: Negative.         Objective:    Physical Exam   Constitutional: She is oriented to person, place, and time. She appears well-developed and well-nourished.   HENT:   Head: Normocephalic and atraumatic.   Eyes: Conjunctivae and EOM are normal. Pupils are equal, round, and reactive to light.   Neck: Normal range of motion. Neck supple. No thyromegaly present.   Cardiovascular: Normal rate and regular rhythm.   No murmur heard.  Pulmonary/Chest: Effort normal and breath sounds normal. No respiratory distress.   Abdominal: Soft. Bowel sounds are normal.   Musculoskeletal: She exhibits no edema.   Neurological: She is alert and oriented to person, place, and time.   Skin: Skin is warm and dry.   Psychiatric: She has a normal mood and affect. Her behavior is normal.         Echo:  7-18  CONCLUSIONS     1 - Severely depressed left ventricular systolic function (EF 25-30%).     2 - Concentric hypertrophy.     3 - Biatrial enlargement.     4 - Restrictive LV filling pattern, indicating markedly elevated LAP (grade 3 diastolic dysfunction).     5 - Pulmonary hypertension. The estimated PA systolic pressure is 56 mmHg.     6 - Mild aortic regurgitation.     7 - Moderate mitral regurgitation.     8 - Mild tricuspid regurgitation.     9 - Mild pulmonic regurgitation.     NST:  Impression: ABNORMAL MYOCARDIAL PERFUSION  1. The perfusion scan is free of evidence for myocardial ischemia.   2. There is severe intensity fixed defect in the lateral wall of the left ventricle, consistent with myocardial injury.   3. Resting wall motion is physiologic.   4. There is resting LV dysfunction with a reduced ejection fraction of 30 %.   5. The ventricular volumes are normal at rest and stress.   6. The extracardiac distribution of radioactivity is normal.     Carotid ultrasound:  CONCLUSIONS   There is 20 - 39% right Internal Carotid stenosis.  There is 20 - 39% left Internal Carotid  stenosis.    Assessment:       1. Coronary artery disease involving native coronary artery of native heart without angina pectoris    2. Syncope, unspecified syncope type    3. Essential hypertension    4. AICD (automatic cardioverter/defibrillator) present    5. Controlled type 2 diabetes mellitus with diabetic nephropathy, without long-term current use of insulin    6. Dyslipidemia    7. Atherosclerosis of aorta         Plan:       -Continue med therapy  -Appears to have some cognitive issues possibly leading to med noncompliance  -Follow-up device checks Medtr[employer]      Return to clinic in 6 months with device check

## 2018-08-21 NOTE — PROGRESS NOTES
Patient, Ruthann Doe (MRN #[id number]), presented with a recent Estimated PA Systolic Pressure greater than 40 mmHG consistent with the definition of pulmonary hypertension (ICD10 - I27.0).    Est. PA Systolic Pressure   Date Value Ref Range Status   07/27/2018 56.29 (A)       The patient's pulmonary hypertension was monitored, evaluated, addressed and/or treated. This addendum to the medical record is made on 08/21/2018.

## 2018-08-21 NOTE — PROGRESS NOTES
Patient, Ruthann Doe (MRN #[id number]), presented with a recent Ejection Fraction less than 45% consistent with the definition of cardiomyopathy (ICD10- I42.8).    EF   Date Value Ref Range Status   07/27/2018 25 (A) 55 - 65      The patient's cardiomyopathy was monitored, evaluated, addressed and/or treated. This addendum to the medical record is made on 08/21/2018.

## 2019-02-11 ENCOUNTER — TELEPHONE (OUTPATIENT)
Dept: CARDIOLOGY | Facility: CLINIC | Age: 79
End: 2019-02-11

## 2019-02-11 NOTE — TELEPHONE ENCOUNTER
Spoke to Ayse, appt rafal   Done     ef        ----- Message from Chris Boyce sent at 2/11/2019  8:50 AM CST -----  Contact: Ayse daughter/[phone]  The patient daughter would like to reschedule her medtronic visit.          Thank you

## 2019-05-14 ENCOUNTER — OFFICE VISIT (OUTPATIENT)
Dept: CARDIOLOGY | Facility: CLINIC | Age: 79
End: 2019-05-14
Payer: MEDICARE

## 2019-05-14 VITALS
OXYGEN SATURATION: 92 % | RESPIRATION RATE: 16 BRPM | DIASTOLIC BLOOD PRESSURE: 62 MMHG | HEART RATE: 64 BPM | BODY MASS INDEX: 19.73 KG/M2 | SYSTOLIC BLOOD PRESSURE: 110 MMHG | WEIGHT: 122.25 LBS

## 2019-05-14 DIAGNOSIS — E78.5 DYSLIPIDEMIA: ICD-10-CM

## 2019-05-14 DIAGNOSIS — I50.22 CHRONIC SYSTOLIC CONGESTIVE HEART FAILURE: ICD-10-CM

## 2019-05-14 DIAGNOSIS — Z95.810 AICD (AUTOMATIC CARDIOVERTER/DEFIBRILLATOR) PRESENT: ICD-10-CM

## 2019-05-14 DIAGNOSIS — R55 SYNCOPE, UNSPECIFIED SYNCOPE TYPE: ICD-10-CM

## 2019-05-14 DIAGNOSIS — I25.10 CORONARY ARTERY DISEASE INVOLVING NATIVE CORONARY ARTERY OF NATIVE HEART WITHOUT ANGINA PECTORIS: Primary | ICD-10-CM

## 2019-05-14 DIAGNOSIS — E11.21 CONTROLLED TYPE 2 DIABETES MELLITUS WITH DIABETIC NEPHROPATHY, WITHOUT LONG-TERM CURRENT USE OF INSULIN: ICD-10-CM

## 2019-05-14 DIAGNOSIS — I10 ESSENTIAL HYPERTENSION: ICD-10-CM

## 2019-05-14 DIAGNOSIS — I70.0 ATHEROSCLEROSIS OF AORTA: ICD-10-CM

## 2019-05-14 PROCEDURE — 3074F SYST BP LT 130 MM HG: CPT | Mod: CPTII,S$GLB,, | Performed by: INTERNAL MEDICINE

## 2019-05-14 PROCEDURE — 99214 PR OFFICE/OUTPT VISIT, EST, LEVL IV, 30-39 MIN: ICD-10-PCS | Mod: S$GLB,,, | Performed by: INTERNAL MEDICINE

## 2019-05-14 PROCEDURE — 3078F PR MOST RECENT DIASTOLIC BLOOD PRESSURE < 80 MM HG: ICD-10-PCS | Mod: CPTII,S$GLB,, | Performed by: INTERNAL MEDICINE

## 2019-05-14 PROCEDURE — 99999 PR PBB SHADOW E&M-EST. PATIENT-LVL III: CPT | Mod: PBBFAC,,, | Performed by: INTERNAL MEDICINE

## 2019-05-14 PROCEDURE — 93289 INTERROG DEVICE EVAL HEART: CPT | Mod: 26,,, | Performed by: INTERNAL MEDICINE

## 2019-05-14 PROCEDURE — 93289 PR INTERROG EVAL, IN PERSON,CARDVERT/DEFIB: ICD-10-PCS | Mod: 26,,, | Performed by: INTERNAL MEDICINE

## 2019-05-14 PROCEDURE — 1101F PT FALLS ASSESS-DOCD LE1/YR: CPT | Mod: CPTII,S$GLB,, | Performed by: INTERNAL MEDICINE

## 2019-05-14 PROCEDURE — 1101F PR PT FALLS ASSESS DOC 0-1 FALLS W/OUT INJ PAST YR: ICD-10-PCS | Mod: CPTII,S$GLB,, | Performed by: INTERNAL MEDICINE

## 2019-05-14 PROCEDURE — 3078F DIAST BP <80 MM HG: CPT | Mod: CPTII,S$GLB,, | Performed by: INTERNAL MEDICINE

## 2019-05-14 PROCEDURE — 3074F PR MOST RECENT SYSTOLIC BLOOD PRESSURE < 130 MM HG: ICD-10-PCS | Mod: CPTII,S$GLB,, | Performed by: INTERNAL MEDICINE

## 2019-05-14 PROCEDURE — 99214 OFFICE O/P EST MOD 30 MIN: CPT | Mod: S$GLB,,, | Performed by: INTERNAL MEDICINE

## 2019-05-14 PROCEDURE — 99999 PR PBB SHADOW E&M-EST. PATIENT-LVL III: ICD-10-PCS | Mod: PBBFAC,,, | Performed by: INTERNAL MEDICINE

## 2019-05-14 NOTE — PROGRESS NOTES
Subjective:    Patient ID:  Ruthann Doe is a 78 y.o. female who presents for follow-up of Follow-up ([employer])      HPI     Previous history:  Here follow-up coronary artery disease and systolic heart failure.  She looks good and feels much better today.  She says that she intermittently has cognitive issues.  Today's a good day where she has no significant problems.  She denies any chest pain, shortness of breath or palpitations.  On bad days she just can't think of what to do and still currently lives by herself taking care of her own house.  She denies any PND, orthopnea or lower extremity edema.  She has not experiencing dizziness, presyncope or syncope.    Today:  Here for follow-up of coronary artery disease and systolic heart failure.  She denies any worsening cardiopulmonary complaints.  Her family is now with her and her caretaker previously was not watching her well enough was fired.  She denies any PND, orthopnea or lower extremity edema.  She is much more alert today.  She denies any dizziness, presyncope or syncope.      Review of Systems   Constitution: Negative.   HENT: Negative.    Eyes: Negative.    Cardiovascular: Positive for dyspnea on exertion. Negative for chest pain, irregular heartbeat, leg swelling, near-syncope, orthopnea, palpitations, paroxysmal nocturnal dyspnea and syncope.   Respiratory: Negative for shortness of breath.    Skin: Negative.    Musculoskeletal: Negative.    Gastrointestinal: Negative for abdominal pain, constipation and diarrhea.   Genitourinary: Negative for dysuria.   Psychiatric/Behavioral: Negative.         Objective:    Physical Exam   Constitutional: She is oriented to person, place, and time. She appears well-developed and well-nourished.   HENT:   Head: Normocephalic and atraumatic.   Eyes: Pupils are equal, round, and reactive to light. Conjunctivae and EOM are normal.   Neck: Normal range of motion. Neck supple. No thyromegaly present.    Cardiovascular: Normal rate and regular rhythm.   No murmur heard.  Pulmonary/Chest: Effort normal and breath sounds normal. No respiratory distress.   Abdominal: Soft. Bowel sounds are normal.   Musculoskeletal: She exhibits no edema.   Neurological: She is alert and oriented to person, place, and time.   Skin: Skin is warm and dry.   Psychiatric: She has a normal mood and affect. Her behavior is normal.         Echo:  7-18  CONCLUSIONS     1 - Severely depressed left ventricular systolic function (EF 25-30%).     2 - Concentric hypertrophy.     3 - Biatrial enlargement.     4 - Restrictive LV filling pattern, indicating markedly elevated LAP (grade 3 diastolic dysfunction).     5 - Pulmonary hypertension. The estimated PA systolic pressure is 56 mmHg.     6 - Mild aortic regurgitation.     7 - Moderate mitral regurgitation.     8 - Mild tricuspid regurgitation.     9 - Mild pulmonic regurgitation.     NST:  Impression: ABNORMAL MYOCARDIAL PERFUSION  1. The perfusion scan is free of evidence for myocardial ischemia.   2. There is severe intensity fixed defect in the lateral wall of the left ventricle, consistent with myocardial injury.   3. Resting wall motion is physiologic.   4. There is resting LV dysfunction with a reduced ejection fraction of 30 %.   5. The ventricular volumes are normal at rest and stress.   6. The extracardiac distribution of radioactivity is normal.     Carotid ultrasound:  CONCLUSIONS   There is 20 - 39% right Internal Carotid stenosis.  There is 20 - 39% left Internal Carotid stenosis.    Labs reviewed from PCP 5-18  LDL-85  Hemoglobin A1c -11.2  Assessment:       1. Coronary artery disease involving native coronary artery of native heart without angina pectoris    2. Syncope, unspecified syncope type    3. Essential hypertension    4. AICD (automatic cardioverter/defibrillator) present    5. Controlled type 2 diabetes mellitus with diabetic nephropathy, without long-term current use of  insulin    6. Dyslipidemia    7. Atherosclerosis of aorta    8. Chronic systolic congestive heart failure         Plan:       -Continue med therapy  -Follow-up device checks [employer]  -follow up with Endocrine for better glycemic control    Return to clinic in 6 months with device check        Medtronic ICD check:    Model:  Mal  Mode VVI lower rate of 40 beats per minute    V pace less than 1%    RV:  15.8 mV, 627 Ohms, 1.25 volts at 0.4 milliseconds    Two SVT episodes lasting longest 14 sec rate of 176  One nonsustained VT episode  No changes    Return to clinic in 6 months

## 2020-01-08 ENCOUNTER — HOSPITAL ENCOUNTER (INPATIENT)
Facility: HOSPITAL | Age: 80
LOS: 12 days | Discharge: NURSING, REHAB OR LONG-TERM CARE | DRG: 596 | End: 2020-01-20
Attending: EMERGENCY MEDICINE | Admitting: HOSPITALIST
Payer: MEDICARE

## 2020-01-08 DIAGNOSIS — I48.91 A-FIB: ICD-10-CM
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
## 2020-01-08 DIAGNOSIS — R41.82 ALTERED MENTAL STATUS, UNSPECIFIED ALTERED MENTAL STATUS TYPE: Primary | ICD-10-CM

## 2020-01-08 DIAGNOSIS — Z51.5 PALLIATIVE CARE ENCOUNTER: ICD-10-CM

## 2020-01-08 DIAGNOSIS — R21 RASH AND NONSPECIFIC SKIN ERUPTION: ICD-10-CM

## 2020-01-08 DIAGNOSIS — I50.40 COMBINED SYSTOLIC AND DIASTOLIC HEART FAILURE: ICD-10-CM

## 2020-01-08 DIAGNOSIS — L10.0 PEMPHIGUS VULGARIS: ICD-10-CM

## 2020-01-08 DIAGNOSIS — R53.1 GENERALIZED WEAKNESS: ICD-10-CM

## 2020-01-08 LAB
ALBUMIN SERPL BCP-MCNC: 3.2 G/DL (ref 3.5–5.2)
ALP SERPL-CCNC: 59 U/L (ref 55–135)
ALT SERPL W/O P-5'-P-CCNC: 16 U/L (ref 10–44)
ANION GAP SERPL CALC-SCNC: 9 MMOL/L (ref 8–16)
AST SERPL-CCNC: 15 U/L (ref 10–40)
BASOPHILS # BLD AUTO: 0.04 K/UL (ref 0–0.2)
BASOPHILS NFR BLD: 0.5 % (ref 0–1.9)
BILIRUB SERPL-MCNC: 0.5 MG/DL (ref 0.1–1)
BUN SERPL-MCNC: 13 MG/DL (ref 8–23)
CALCIUM SERPL-MCNC: 9.3 MG/DL (ref 8.7–10.5)
CHLORIDE SERPL-SCNC: 106 MMOL/L (ref 95–110)
CO2 SERPL-SCNC: 23 MMOL/L (ref 23–29)
CREAT SERPL-MCNC: 1 MG/DL (ref 0.5–1.4)
DIFFERENTIAL METHOD: ABNORMAL
EOSINOPHIL # BLD AUTO: 0.1 K/UL (ref 0–0.5)
EOSINOPHIL NFR BLD: 0.7 % (ref 0–8)
ERYTHROCYTE [DISTWIDTH] IN BLOOD BY AUTOMATED COUNT: 13.8 % (ref 11.5–14.5)
EST. GFR  (AFRICAN AMERICAN): >60 ML/MIN/1.73 M^2
EST. GFR  (NON AFRICAN AMERICAN): 54 ML/MIN/1.73 M^2
GLUCOSE SERPL-MCNC: 201 MG/DL (ref 70–110)
HCT VFR BLD AUTO: 31.2 % (ref 37–48.5)
HGB BLD-MCNC: 10.2 G/DL (ref 12–16)
IMM GRANULOCYTES # BLD AUTO: 0.03 K/UL (ref 0–0.04)
IMM GRANULOCYTES NFR BLD AUTO: 0.3 % (ref 0–0.5)
LACTATE SERPL-SCNC: 1.4 MMOL/L (ref 0.5–2.2)
LYMPHOCYTES # BLD AUTO: 0.7 K/UL (ref 1–4.8)
LYMPHOCYTES NFR BLD: 8.1 % (ref 18–48)
MAGNESIUM SERPL-MCNC: 1.8 MG/DL (ref 1.6–2.6)
MCH RBC QN AUTO: 28.1 PG (ref 27–31)
MCHC RBC AUTO-ENTMCNC: 32.7 G/DL (ref 32–36)
MCV RBC AUTO: 86 FL (ref 82–98)
MONOCYTES # BLD AUTO: 1 K/UL (ref 0.3–1)
MONOCYTES NFR BLD: 11.7 % (ref 4–15)
NEUTROPHILS # BLD AUTO: 6.9 K/UL (ref 1.8–7.7)
NEUTROPHILS NFR BLD: 78.7 % (ref 38–73)
NRBC BLD-RTO: 0 /100 WBC
PHOSPHATE SERPL-MCNC: 2.2 MG/DL (ref 2.7–4.5)
PLATELET # BLD AUTO: 343 K/UL (ref 150–350)
PMV BLD AUTO: 9.5 FL (ref 9.2–12.9)
POCT GLUCOSE: 186 MG/DL (ref 70–110)
POTASSIUM SERPL-SCNC: 4.3 MMOL/L (ref 3.5–5.1)
PROT SERPL-MCNC: 6.6 G/DL (ref 6–8.4)
RBC # BLD AUTO: 3.63 M/UL (ref 4–5.4)
SODIUM SERPL-SCNC: 138 MMOL/L (ref 136–145)
TROPONIN I SERPL DL<=0.01 NG/ML-MCNC: 0.02 NG/ML (ref 0–0.03)
WBC # BLD AUTO: 8.74 K/UL (ref 3.9–12.7)

## 2020-01-08 PROCEDURE — 12000002 HC ACUTE/MED SURGE SEMI-PRIVATE ROOM

## 2020-01-08 PROCEDURE — 84100 ASSAY OF PHOSPHORUS: CPT

## 2020-01-08 PROCEDURE — 99285 EMERGENCY DEPT VISIT HI MDM: CPT | Mod: 25

## 2020-01-08 PROCEDURE — 93010 EKG 12-LEAD: ICD-10-PCS | Mod: ,,, | Performed by: INTERNAL MEDICINE

## 2020-01-08 PROCEDURE — 83735 ASSAY OF MAGNESIUM: CPT

## 2020-01-08 PROCEDURE — 93005 ELECTROCARDIOGRAM TRACING: CPT

## 2020-01-08 PROCEDURE — 80053 COMPREHEN METABOLIC PANEL: CPT

## 2020-01-08 PROCEDURE — 84484 ASSAY OF TROPONIN QUANT: CPT

## 2020-01-08 PROCEDURE — 82962 GLUCOSE BLOOD TEST: CPT

## 2020-01-08 PROCEDURE — 83605 ASSAY OF LACTIC ACID: CPT

## 2020-01-08 PROCEDURE — 80061 LIPID PANEL: CPT

## 2020-01-08 PROCEDURE — 93010 ELECTROCARDIOGRAM REPORT: CPT | Mod: ,,, | Performed by: INTERNAL MEDICINE

## 2020-01-08 PROCEDURE — 85025 COMPLETE CBC W/AUTO DIFF WBC: CPT

## 2020-01-08 PROCEDURE — 81000 URINALYSIS NONAUTO W/SCOPE: CPT

## 2020-01-08 RX ORDER — PREDNISONE 5 MG/1
5 TABLET ORAL DAILY
Status: ON HOLD | COMMUNITY
End: 2020-01-20 | Stop reason: HOSPADM

## 2020-01-08 RX ORDER — METFORMIN HYDROCHLORIDE 1000 MG/1
1000 TABLET ORAL 2 TIMES DAILY WITH MEALS
COMMUNITY

## 2020-01-08 RX ORDER — CLINDAMYCIN HYDROCHLORIDE 300 MG/1
300 CAPSULE ORAL 3 TIMES DAILY
Status: ON HOLD | COMMUNITY
End: 2020-01-09

## 2020-01-09 PROBLEM — L89.620 PRESSURE INJURY OF LEFT HEEL, UNSTAGEABLE: Status: ACTIVE | Noted: 2020-01-09

## 2020-01-09 PROBLEM — R41.82 ALTERED MENTAL STATUS: Status: ACTIVE | Noted: 2020-01-09

## 2020-01-09 PROBLEM — E11.22 CKD STAGE 2 DUE TO TYPE 2 DIABETES MELLITUS: Status: ACTIVE | Noted: 2020-01-09

## 2020-01-09 PROBLEM — D64.9 ANEMIA: Status: ACTIVE | Noted: 2020-01-09

## 2020-01-09 PROBLEM — R23.8 SKIN BREAKDOWN: Status: ACTIVE | Noted: 2020-01-09

## 2020-01-09 PROBLEM — R21 RASH AND OTHER NONSPECIFIC SKIN ERUPTION: Status: ACTIVE | Noted: 2020-01-09

## 2020-01-09 PROBLEM — I50.42 CHRONIC COMBINED SYSTOLIC AND DIASTOLIC HEART FAILURE: Status: ACTIVE | Noted: 2020-01-09

## 2020-01-09 PROBLEM — N18.2 CKD STAGE 2 DUE TO TYPE 2 DIABETES MELLITUS: Status: ACTIVE | Noted: 2020-01-09

## 2020-01-09 PROBLEM — R29.6 RECURRENT FALLS: Status: ACTIVE | Noted: 2020-01-09

## 2020-01-09 LAB
BACTERIA #/AREA URNS HPF: NORMAL /HPF
BASOPHILS # BLD AUTO: 0.04 K/UL (ref 0–0.2)
BASOPHILS NFR BLD: 0.6 % (ref 0–1.9)
BILIRUB UR QL STRIP: NEGATIVE
CHOLEST SERPL-MCNC: 130 MG/DL (ref 120–199)
CHOLEST/HDLC SERPL: 2.5 {RATIO} (ref 2–5)
CLARITY UR: ABNORMAL
COLOR UR: YELLOW
DIFFERENTIAL METHOD: ABNORMAL
EOSINOPHIL # BLD AUTO: 0.3 K/UL (ref 0–0.5)
EOSINOPHIL NFR BLD: 3.9 % (ref 0–8)
ERYTHROCYTE [DISTWIDTH] IN BLOOD BY AUTOMATED COUNT: 13.8 % (ref 11.5–14.5)
GLUCOSE UR QL STRIP: ABNORMAL
HCT VFR BLD AUTO: 32.3 % (ref 37–48.5)
HDLC SERPL-MCNC: 51 MG/DL (ref 40–75)
HDLC SERPL: 39.2 % (ref 20–50)
HGB BLD-MCNC: 10.4 G/DL (ref 12–16)
HGB UR QL STRIP: ABNORMAL
HYALINE CASTS #/AREA URNS LPF: 0 /LPF
IMM GRANULOCYTES # BLD AUTO: 0.02 K/UL (ref 0–0.04)
IMM GRANULOCYTES NFR BLD AUTO: 0.3 % (ref 0–0.5)
KETONES UR QL STRIP: ABNORMAL
LDLC SERPL CALC-MCNC: 58.8 MG/DL (ref 63–159)
LEUKOCYTE ESTERASE UR QL STRIP: ABNORMAL
LYMPHOCYTES # BLD AUTO: 0.8 K/UL (ref 1–4.8)
LYMPHOCYTES NFR BLD: 11.3 % (ref 18–48)
MCH RBC QN AUTO: 28.3 PG (ref 27–31)
MCHC RBC AUTO-ENTMCNC: 32.2 G/DL (ref 32–36)
MCV RBC AUTO: 88 FL (ref 82–98)
MICROSCOPIC COMMENT: NORMAL
MONOCYTES # BLD AUTO: 0.9 K/UL (ref 0.3–1)
MONOCYTES NFR BLD: 12.7 % (ref 4–15)
NEUTROPHILS # BLD AUTO: 4.9 K/UL (ref 1.8–7.7)
NEUTROPHILS NFR BLD: 71.2 % (ref 38–73)
NITRITE UR QL STRIP: NEGATIVE
NONHDLC SERPL-MCNC: 79 MG/DL
NRBC BLD-RTO: 0 /100 WBC
PH UR STRIP: 5 [PH] (ref 5–8)
PLATELET # BLD AUTO: 346 K/UL (ref 150–350)
PMV BLD AUTO: 9.5 FL (ref 9.2–12.9)
POCT GLUCOSE: 163 MG/DL (ref 70–110)
POCT GLUCOSE: 310 MG/DL (ref 70–110)
PROT UR QL STRIP: ABNORMAL
RBC # BLD AUTO: 3.68 M/UL (ref 4–5.4)
RBC #/AREA URNS HPF: 2 /HPF (ref 0–4)
SP GR UR STRIP: 1.02 (ref 1–1.03)
TRIGL SERPL-MCNC: 101 MG/DL (ref 30–150)
TSH SERPL DL<=0.005 MIU/L-ACNC: 0.42 UIU/ML (ref 0.4–4)
URN SPEC COLLECT METH UR: ABNORMAL
UROBILINOGEN UR STRIP-ACNC: NEGATIVE EU/DL
WBC # BLD AUTO: 6.92 K/UL (ref 3.9–12.7)
WBC #/AREA URNS HPF: 4 /HPF (ref 0–5)

## 2020-01-09 PROCEDURE — 99222 PR INITIAL HOSPITAL CARE,LEVL II: ICD-10-PCS | Mod: ,,, | Performed by: NURSE PRACTITIONER

## 2020-01-09 PROCEDURE — 25000003 PHARM REV CODE 250: Performed by: NURSE PRACTITIONER

## 2020-01-09 PROCEDURE — 25000003 PHARM REV CODE 250: Performed by: EMERGENCY MEDICINE

## 2020-01-09 PROCEDURE — 85025 COMPLETE CBC W/AUTO DIFF WBC: CPT

## 2020-01-09 PROCEDURE — 36415 COLL VENOUS BLD VENIPUNCTURE: CPT

## 2020-01-09 PROCEDURE — 83036 HEMOGLOBIN GLYCOSYLATED A1C: CPT

## 2020-01-09 PROCEDURE — 96374 THER/PROPH/DIAG INJ IV PUSH: CPT

## 2020-01-09 PROCEDURE — 99222 1ST HOSP IP/OBS MODERATE 55: CPT | Mod: ,,, | Performed by: NURSE PRACTITIONER

## 2020-01-09 PROCEDURE — 84443 ASSAY THYROID STIM HORMONE: CPT

## 2020-01-09 PROCEDURE — 21400001 HC TELEMETRY ROOM

## 2020-01-09 PROCEDURE — 63600175 PHARM REV CODE 636 W HCPCS: Performed by: NURSE PRACTITIONER

## 2020-01-09 RX ORDER — ASPIRIN 325 MG
325 TABLET, DELAYED RELEASE (ENTERIC COATED) ORAL DAILY
Status: DISCONTINUED | OUTPATIENT
Start: 2020-01-09 | End: 2020-01-20 | Stop reason: HOSPADM

## 2020-01-09 RX ORDER — IBUPROFEN 200 MG
24 TABLET ORAL
Status: DISCONTINUED | OUTPATIENT
Start: 2020-01-09 | End: 2020-01-20 | Stop reason: HOSPADM

## 2020-01-09 RX ORDER — SODIUM CHLORIDE 0.9 % (FLUSH) 0.9 %
10 SYRINGE (ML) INJECTION
Status: DISCONTINUED | OUTPATIENT
Start: 2020-01-09 | End: 2020-01-20 | Stop reason: HOSPADM

## 2020-01-09 RX ORDER — INSULIN ASPART 100 [IU]/ML
0-5 INJECTION, SOLUTION INTRAVENOUS; SUBCUTANEOUS
Status: DISCONTINUED | OUTPATIENT
Start: 2020-01-09 | End: 2020-01-20 | Stop reason: HOSPADM

## 2020-01-09 RX ORDER — ACETAMINOPHEN 325 MG/1
650 TABLET ORAL EVERY 8 HOURS PRN
Status: DISCONTINUED | OUTPATIENT
Start: 2020-01-09 | End: 2020-01-20 | Stop reason: HOSPADM

## 2020-01-09 RX ORDER — ONDANSETRON 2 MG/ML
4 INJECTION INTRAMUSCULAR; INTRAVENOUS EVERY 8 HOURS PRN
Status: DISCONTINUED | OUTPATIENT
Start: 2020-01-09 | End: 2020-01-20 | Stop reason: HOSPADM

## 2020-01-09 RX ORDER — IBUPROFEN 200 MG
16 TABLET ORAL
Status: DISCONTINUED | OUTPATIENT
Start: 2020-01-09 | End: 2020-01-20 | Stop reason: HOSPADM

## 2020-01-09 RX ORDER — PRAVASTATIN SODIUM 40 MG/1
40 TABLET ORAL NIGHTLY
Status: DISCONTINUED | OUTPATIENT
Start: 2020-01-09 | End: 2020-01-20 | Stop reason: HOSPADM

## 2020-01-09 RX ORDER — CLOPIDOGREL BISULFATE 75 MG/1
75 TABLET ORAL DAILY
Status: DISCONTINUED | OUTPATIENT
Start: 2020-01-09 | End: 2020-01-20 | Stop reason: HOSPADM

## 2020-01-09 RX ORDER — AMOXICILLIN 250 MG
1 CAPSULE ORAL 2 TIMES DAILY
Status: DISCONTINUED | OUTPATIENT
Start: 2020-01-09 | End: 2020-01-20 | Stop reason: HOSPADM

## 2020-01-09 RX ORDER — DIPHENHYDRAMINE HCL 25 MG
25 CAPSULE ORAL
Status: COMPLETED | OUTPATIENT
Start: 2020-01-09 | End: 2020-01-09

## 2020-01-09 RX ORDER — GLUCAGON 1 MG
1 KIT INJECTION
Status: DISCONTINUED | OUTPATIENT
Start: 2020-01-09 | End: 2020-01-20 | Stop reason: HOSPADM

## 2020-01-09 RX ORDER — FAMOTIDINE 20 MG/1
20 TABLET, FILM COATED ORAL 2 TIMES DAILY
Status: DISCONTINUED | OUTPATIENT
Start: 2020-01-09 | End: 2020-01-09

## 2020-01-09 RX ORDER — TRIAMCINOLONE ACETONIDE 1 MG/G
CREAM TOPICAL 2 TIMES DAILY
Status: DISCONTINUED | OUTPATIENT
Start: 2020-01-09 | End: 2020-01-20 | Stop reason: HOSPADM

## 2020-01-09 RX ORDER — TRIAMCINOLONE ACETONIDE 1 MG/G
CREAM TOPICAL 2 TIMES DAILY
Status: DISCONTINUED | OUTPATIENT
Start: 2020-01-09 | End: 2020-01-09

## 2020-01-09 RX ADMIN — CLOPIDOGREL BISULFATE 75 MG: 75 TABLET ORAL at 03:01

## 2020-01-09 RX ADMIN — INSULIN ASPART 4 UNITS: 100 INJECTION, SOLUTION INTRAVENOUS; SUBCUTANEOUS at 06:01

## 2020-01-09 RX ADMIN — ACETAMINOPHEN 650 MG: 325 TABLET ORAL at 03:01

## 2020-01-09 RX ADMIN — METHYLPREDNISOLONE SODIUM SUCCINATE 30 MG: 40 INJECTION, POWDER, FOR SOLUTION INTRAMUSCULAR; INTRAVENOUS at 03:01

## 2020-01-09 RX ADMIN — SENNOSIDES AND DOCUSATE SODIUM 1 TABLET: 8.6; 5 TABLET ORAL at 08:01

## 2020-01-09 RX ADMIN — TRIAMCINOLONE ACETONIDE: 1 CREAM TOPICAL at 03:01

## 2020-01-09 RX ADMIN — PRAVASTATIN SODIUM 40 MG: 40 TABLET ORAL at 08:01

## 2020-01-09 RX ADMIN — DIPHENHYDRAMINE HYDROCHLORIDE 25 MG: 25 CAPSULE ORAL at 08:01

## 2020-01-09 RX ADMIN — ASPIRIN 325 MG: 325 TABLET, DELAYED RELEASE ORAL at 03:01

## 2020-01-09 NOTE — NURSING
Patient has order for dermatology consult to Dr. Nichols. Per Dr. Nichols patients insuarnace is not accepted. Referred to Dr. Hinton

## 2020-01-09 NOTE — NURSING
Patient arrived to unit via stretcher, transferred to bed with assistance x 2. Tele monitor and vitals in progress. Will continue to monitor.

## 2020-01-09 NOTE — PT/OT/SLP PROGRESS
Occupational Therapy      Patient Name:  Ruthann Doe   MRN:  [id number]    Patient not seen today secondary to Other (Comment). Will follow-up tomorrow as pt is not appropriate for skilled therapy today.    Chaenl Maguire OT  1/9/2020

## 2020-01-09 NOTE — PLAN OF CARE
01/09/20 1029   Discharge Assessment   Assessment Type Discharge Planning Assessment   Assessment information obtained from? Caregiver  (daugther Ayse and son in law Dav)   Communicated expected length of stay with patient/caregiver yes   Prior to hospitilization cognitive status: Alert/Oriented  (pt has hx of dementia-- but is alert)   Prior to hospitalization functional status: Assistive Equipment   Current cognitive status: Unable to Assess   Facility Arrived From: home   Lives With alone  (daughter Ayse and son in law Dav provide all meals for the pt and check on pt daily)   Able to Return to Prior Arrangements   (TBD)   Is patient able to care for self after discharge? Unable to determine at this time (comments)   Who are your caregiver(s) and their phone number(s)? [facility]- [phone]   Patient's perception of discharge disposition home or selfcare   Readmission Within the Last 30 Days no previous admission in last 30 days   Patient currently being followed by outpatient case management? No   Patient currently receives any other outside agency services? No   Equipment Currently Used at Home none   Do you have any problems affording any of your prescribed medications? No   Is the patient taking medications as prescribed? yes   Does the patient have transportation home? Yes   Transportation Anticipated family or friend will provide   Does the patient receive services at the [facility]? No   Discharge Plan A   (TBD)   Patient/Family in Agreement with Plan yes     [id number] - [place] - [address] AT [facility] [address]  [address]  [place] [address]  Phone: [phone] Fax: [phone]    [id number] - [place] - [address]  [address] [place] [address]  Phone: [phone] Fax: [phone]

## 2020-01-09 NOTE — HPI
Mrs. Doe is a 78 yo female with significant hypertension, hyperlipidemia, diabetes type 2, dementia, PPM/AICD?, CAD status post MI, chronic systolic diastolic heart failure, and pulmonary hypertension who was brought by Daughter Ayse to hospital for worsen dementia, recurrent falls and need for NHP. Patient lives alone. Daughter Ayse have been estranged from patient for over 10 years but stepped in few years ago to assist. Daughter Ayse reports patient dementia with visual /aduitory hallucinations for over 2 years and consisting of dead people and cats for 2 years. Baseline ambulates with walker up until 10 days ago.    Patient also found to have diffuse body lesions consisting for blisters and scab over lesion through out body sparring the buttock and perineum. Patient had similar lesions in Sept however only on torso which cleared up with steroid injections in Sept and October. By end of November, lesions completely resolved. 10 days ago patient was brought to Urgent Care on [facility] then told had cellulitis prescribed clindamycin 300 mg TID.    CXR no acute process. CTH  No acute intracranial abnormality. Afebrile and no leukocytosis. UA no evidence of infection.

## 2020-01-09 NOTE — ASSESSMENT & PLAN NOTE
"See physical exam. Patient was seen by Dermatology Dr. Hinton in Sept for similar lesion but not this extensive. Lesion cleared with steroid injections? X 2. Daughter Ayse reports biopsy showed "Dermatitis of unknown origin."   Denies any new antibiotics up untial 12/29 placed on clindamycin for "cellulits" when seen by Urgent [facility] [address].   Will hold clindamycin  Consult Dermatology -Called office [phone] at approx 1030 and again 1100 however provider will not available until 1 pm.  Consult wound care to assist.     "

## 2020-01-09 NOTE — CONSULTS
A 79 year old female admitted 1/8/20 from home with altered mental status; rash and other non specific skin eruption; chronic combined systolic and diastolic heart failure; pressure injury left heel unstageable; recurrent falls; diffuse skin breakdown and lesions; CKD Stage 2 due to DM II; anemia; diabetic ketoacidosis without coma associated with DM II; AICD; essential hypertension; CAD  PMH: Rheumatoid arthritis; cardiac arrhythmia; CAD; DM II; pneumonia; renal insufficiency; S/P CABG; AICD; pemphigus vulgaris  1/9 WBC 6.92 Hgb 10.4 Hct 32.3   1/8 Alb 3.2   1/21/18 A1C 10.2  On Isoflex mattress  Rash/dermatitis treatment recommendations per Dermatology (for pemphigus vulgaris)  Consulted for unstageable left heel pressure injury  Assessment:  Photodocumentation    Left lateral heel- Dry crusty roof 4.5 cm circular area without surrounding erythema. Patient independent bed mobility/moving legs without difficulty. Was ambulatory on admission. Unknown etiology of wound.   Treatment:  Local wound care to left lateral heel every shift with hydrogel.

## 2020-01-09 NOTE — ED PROVIDER NOTES
Encounter Date: 1/8/2020    SCRIBE #1 NOTE: I, Lory Stock, am scribing for, and in the presence of,  Kee Palacios MD. I have scribed the following portions of the note - Other sections scribed: HPI, ROS, PE, EKG, ED Management.       History     Chief Complaint   Patient presents with    Altered Mental Status     pt sent by family for multiable falls and increased dementia. EMS reports family states they want pt to go to nursing home. pt also has rash on trunk     CC: Fall; Rash    Patient is a 79 y.o female with a PMHx of RA, DM, CAD, cardiac arrhthymia, and renal insufficiency who presents to the ED for an AMS that began within the past 10 days. Patient's son-in-law reports of 2 falls today and 3 falls within the last week.  Patient fell this morning at 11 am after which she had an episode of diarrhea. The second fall occurred around 8 pm tonight. He reports that the patient uses a walker and cane at home. He states that she had difficulty keeping her balance even while standing. He reports of a bruise to the left, lower periorbital area following a fall in December, 2019.  He states that her memory has declined over the past month. Patient lives on her own at this time. She reportedly has a a good diet, but reported of a decrease in appetite recently.  Patient has no significant complaints this time.  She is pleasant though only oriented x1..    Her daughter reports of a diffuse rash to the patient's trunk and back with weeping sores. The rash recently spread to her lower extremities. Patient reports of associated itching. Per family, she was seen by a dermatologist in December, 2019 and was thought to have contact dermatitis. She was treated with Celestone injection and Dexamethasone cream. The rash resolved after treatment. Family bought new towels, clothes, and switched to fragrance free soaps and detergents. The rash later returned within the last 10 days. She was then seen by an urgent care  provider who believed that the rash may be bacterial. Patient was treated with Clindomycin 300 mg BID for 10 days, which she still taking. No new medications or ABx in  prior to rash. Patient is allergic to Sulfa in which she receives a rash after use. She is also allergic to Percocet. No SHx of alcohol consumption, smoking tobacco products, or drug use.     The history is provided by the patient and a relative. No  was used.     Review of patient's allergies indicates:   Allergen Reactions    Sulfa (sulfonamide antibiotics) Hives    Percocet [oxycodone-acetaminophen] Nausea And Vomiting     Past Medical History:   Diagnosis Date    Arthritis     Rheumatoid  arthritis    Cardiac arrhythmia     Coronary artery disease     Diabetes mellitus type II     Pneumonia     as a kid, with empyema    Renal insufficiency     Rheumatoid arthritis(714.0)      Past Surgical History:   Procedure Laterality Date    CARDIAC DEFIBRILLATOR PLACEMENT      CORONARY ARTERY BYPASS GRAFT      FOOT SURGERY       4 para 3       HYSTERECTOMY      osteomyelitis      TONSILLECTOMY      TOTAL KNEE ARTHROPLASTY      TOTAL SHOULDER ARTHROPLASTY       Family History   Problem Relation Age of Onset    Heart disease Mother     Diabetes Mother     Cancer Maternal Uncle      Social History     Tobacco Use    Smoking status: Never Smoker    Smokeless tobacco: Never Used    Tobacco comment: Lives with son, extended family.  Three children.   Substance Use Topics    Alcohol use: Not Currently     Comment: only during holidays    Drug use: No     Review of Systems   Constitutional: Positive for appetite change (Decreased). Negative for fever.   HENT: Negative for sore throat.    Respiratory: Negative for shortness of breath.    Cardiovascular: Negative for chest pain.   Gastrointestinal: Positive for diarrhea.   Genitourinary: Negative for dysuria.   Musculoskeletal: Positive for gait  problem.   Skin: Positive for rash (with itching) and wound.        + Bruise to left, lower periorbital area   Neurological: Negative for headaches.   Psychiatric/Behavioral: Positive for confusion.       Physical Exam     Initial Vitals [01/08/20 2104]   BP Pulse Resp Temp SpO2   (!) 160/70 88 18 98.5 °F (36.9 °C) 98 %      MAP       --         Physical Exam    Nursing note and vitals reviewed.  Constitutional: She appears well-developed and well-nourished. She is not diaphoretic. No distress.   Poor hygiene. Lower extremities covered in feces.   HENT:   Head: Normocephalic and atraumatic.   Mouth/Throat: Oropharynx is clear and moist. Mucous membranes are pale. No posterior oropharyngeal edema.   Pale mucous membranes. No edema or inflammation of mucosal surfaces.   Eyes: EOM are normal. Pupils are equal, round, and reactive to light. Right eye exhibits no discharge. Left eye exhibits no discharge. No scleral icterus.   Pale conjunctivae.    Neck: Normal range of motion.   Cardiovascular: Normal rate, regular rhythm and normal heart sounds. Exam reveals no gallop and no friction rub.    No murmur heard.  Pulmonary/Chest: Breath sounds normal. No respiratory distress. She has no wheezes. She has no rhonchi. She has no rales.   Abdominal: Soft. She exhibits no distension and no mass. There is no tenderness. There is no rebound and no guarding.   Musculoskeletal: Normal range of motion. She exhibits no edema or tenderness.   Neurological: She is alert. She has normal strength.   Skin: Skin is warm and dry. Rash noted. No erythema.   Diffuse, ulcerated, weeping areas of skin break down with some bullous blistering.   Psychiatric: She has a normal mood and affect. Her behavior is normal. Judgment and thought content normal.         ED Course   Procedures  Labs Reviewed   CBC W/ AUTO DIFFERENTIAL - Abnormal; Notable for the following components:       Result Value    RBC 3.63 (*)     Hemoglobin 10.2 (*)     Hematocrit  31.2 (*)     Lymph # 0.7 (*)     Gran% 78.7 (*)     Lymph% 8.1 (*)     All other components within normal limits   COMPREHENSIVE METABOLIC PANEL - Abnormal; Notable for the following components:    Glucose 201 (*)     Albumin 3.2 (*)     eGFR if non  54 (*)     All other components within normal limits   URINALYSIS, REFLEX TO URINE CULTURE - Abnormal; Notable for the following components:    Appearance, UA Hazy (*)     Protein, UA 1+ (*)     Glucose, UA 1+ (*)     Ketones, UA 1+ (*)     Occult Blood UA 1+ (*)     Leukocytes, UA 1+ (*)     All other components within normal limits    Narrative:     Preferred Collection Type->Urine, Clean Catch   PHOSPHORUS - Abnormal; Notable for the following components:    Phosphorus 2.2 (*)     All other components within normal limits   POCT GLUCOSE - Abnormal; Notable for the following components:    POCT Glucose 186 (*)     All other components within normal limits   LACTIC ACID, PLASMA   MAGNESIUM   TROPONIN I   URINALYSIS MICROSCOPIC    Narrative:     Preferred Collection Type->Urine, Clean Catch     EKG Readings: (Independently Interpreted)   Initial Reading: No STEMI. Previous EKG: Compared with most recent EKG Previous EKG Date: 07/27/2018. Heart Rate: 72 bpm.   Probable Sinus vs Accelerated Junctional rhythm.        Imaging Results          CT Head Without Contrast (Final result)  Result time 01/08/20 23:28:42    Final result by Mallory Gong MD (01/08/20 23:28:42)                 Impression:      No acute intracranial abnormality detected.  Cerebral atrophy and chronic small vessel ischemic changes.    Left posterior ethmoid and left sphenoid sinus disease.      Electronically signed by: Mallory Gong  Date:    01/08/2020  Time:    23:28             Narrative:    EXAMINATION:  CT OF THE HEAD WITHOUT    CLINICAL HISTORY:  Confusion/delirium, altered LOC, unexplained;    TECHNIQUE:  5 mm unenhanced axial images were obtained from the skull base to  the vertex.    COMPARISON:  09/13/2013    FINDINGS:  Moderate cerebral atrophy and chronic small vessel ischemic changes are present.  There is no acute intracranial hemorrhage, territorial infarct or mass effect, or midline shift.  There is a 2-3 mm rounded calcification seen in the right sylvian fissure, which was not present on the previous examination.  In the visualized paranasal sinuses, there is mucoperiosteal thickening seen in the left posterior ethmoid air cells and left sphenoid sinus.                               X-Ray Chest AP Portable (Final result)  Result time 01/08/20 23:09:56    Final result by Fortunato Ortiz MD (01/08/20 23:09:56)                 Impression:      No acute process.      Electronically signed by: Fortunato Ortiz MD  Date:    01/08/2020  Time:    23:09             Narrative:    EXAMINATION:  XR CHEST AP PORTABLE    CLINICAL HISTORY:  Sepsis;    TECHNIQUE:  Single frontal view of the chest was performed.    COMPARISON:  07/02/2018.    FINDINGS:  There are changes of median sternotomy.  There is a left-sided single lead cardiac pacing device.  Monitoring EKG leads are present.  There are postoperative changes in the left shoulder.    The trachea is unremarkable.  The cardiac silhouette is within normal limits.  The hemidiaphragms are unremarkable.  There is no evidence of free air beneath the hemidiaphragms.  There are no pleural effusions.  There is no evidence of a pneumothorax.  There is no evidence of pneumomediastinum.  No airspace opacity is present.                                 Medical Decision Making:   History:   Old Medical Records: I decided to obtain old medical records.  Initial Assessment:   79-year-old female with history of dementia presenting with altered mental status, frequent falls, and inability to ambulate due to being off balance.  On exam, the patient is chronically ill-appearing.  She is disheveled and covered in dried diarrhea.  Her skin is covered with  multiple blistering wounds with some bulla.  Some of them appear longstanding and chronic others appear relatively recent.  They are weeping with areas of intertriginous fungal involvement.  They are itchy more than tender.  There is no mucosal involvement whatsoever.  Her neuro exam is nonfocal. Differential is relatively broad.  It includes infection, stroke, worsening dementia.  Vitals are relatively normal.  Lab workup is relatively unremarkable. CT head negative.  X-ray negative. Cause of altered mental status and balance difficulty unclear.  Patient would benefit from further monitoring and evaluation.  She has a cardiac history with an EF of 25% and pacer.  Arrhythmia not ruled out. In addition, she will benefit from social work consult given her high risk as she currently lives by herself with significant dementia.  Independently Interpreted Test(s):   I have ordered and independently interpreted X-rays - see prior notes.  I have ordered and independently interpreted EKG Reading(s) - see prior notes  Clinical Tests:   Lab Tests: Ordered and Reviewed  Radiological Study: Ordered and Reviewed  Medical Tests: Ordered and Reviewed  ED Management:  0002: Discussed patient's case with Dr. Kim, hospitalist.             Scribe Attestation:   Scribe #1: I performed the above scribed service and the documentation accurately describes the services I performed. I attest to the accuracy of the note.                          Clinical Impression:       ICD-10-CM ICD-9-CM   1. Altered mental status, unspecified altered mental status type R41.82 780.97   2. Fall W19.XXXA E888.9   3. Generalized weakness R53.1 780.79   4. Rash and nonspecific skin eruption R21 782.1         Disposition:   Disposition: Placed in Observation  Condition: Stable        I, Kee Palacios, personally performed the services described in this documentation. All medical record entries made by the scribe were at my direction and in my presence.   I have reviewed the chart and agree that the record reflects my personal performance and is accurate and complete.               Kee Palacios MD  01/09/20 0224

## 2020-01-09 NOTE — ASSESSMENT & PLAN NOTE
Daughter Ayse reports history of MI/stents/AICD/PPM but not sure dates and previous test.   Denies chest pain or shortness of breath  Continue ASA, plavix, metoprolol. Previously on ramipril.

## 2020-01-09 NOTE — H&P
[facility] Medicine  History & Physical    Patient Name: Ruthann Doe  MRN: [id number]  Admission Date: 1/8/2020  Attending Physician: Yoko Glover MD   Primary Care Provider: Jesse Partida MD         Patient information was obtained from patient and ER records.     Subjective:     Principal Problem:Altered mental status    Chief Complaint:   Chief Complaint   Patient presents with    Altered Mental Status     pt sent by family for multiable falls and increased dementia. EMS reports family states they want pt to go to nursing home. pt also has rash on trunk        HPI: Mrs. Doe is a 80 yo female with significant hypertension, hyperlipidemia, diabetes type 2, dementia, PPM/AICD?, CAD status post MI, chronic systolic diastolic heart failure, and pulmonary hypertension who was brought by Daughter Ayse to hospital for worsen dementia, recurrent falls and need for NHP. Patient lives alone. Daughter Ayse have been estranged from patient for over 10 years but stepped in few years ago to assist. Daughter Ayse reports patient dementia with visual /aduitory hallucinations for over 2 years and consisting of dead people and cats for 2 years. Baseline ambulates with walker up until 10 days ago.    Patient also found to have diffuse body lesions consisting for blisters and scab over lesion through out body sparring the buttock and perineum. Patient had similar lesions in Sept however only on torso which cleared up with steroid injections in Sept and October. By end of November, lesions completely resolved. 10 days ago patient was brought to [facility] then told had cellulitis prescribed clindamycin 300 mg TID.    CXR no acute process. CTH  No acute intracranial abnormality. Afebrile and no leukocytosis. UA no evidence of infection.     Past Medical History:   Diagnosis Date    Arthritis     Rheumatoid  arthritis    Cardiac arrhythmia     Coronary artery disease      Diabetes mellitus type II     Pneumonia     as a kid, with empyema    Renal insufficiency     Rheumatoid arthritis(714.0)        Past Surgical History:   Procedure Laterality Date    CARDIAC DEFIBRILLATOR PLACEMENT      CORONARY ARTERY BYPASS GRAFT      FOOT SURGERY       4 para 3       HYSTERECTOMY      osteomyelitis      TONSILLECTOMY      TOTAL KNEE ARTHROPLASTY      TOTAL SHOULDER ARTHROPLASTY         Review of patient's allergies indicates:   Allergen Reactions    Sulfa (sulfonamide antibiotics) Hives    Percocet [oxycodone-acetaminophen] Nausea And Vomiting       No current facility-administered medications on file prior to encounter.      Current Outpatient Medications on File Prior to Encounter   Medication Sig    aspirin (ECOTRIN) 325 MG EC tablet Take 325 mg by mouth once daily.      clindamycin (CLEOCIN) 300 MG capsule Take 300 mg by mouth 3 (three) times daily.    clopidogrel (PLAVIX) 75 mg tablet Take 1 tablet (75 mg total) by mouth once daily.    glimepiride (AMARYL) 1 MG tablet     linaGLIPtin (TRADJENTA) 5 mg Tab tablet Take 5 mg by mouth once daily.    metFORMIN (GLUCOPHAGE) 1000 MG tablet Take 1,000 mg by mouth 2 (two) times daily with meals.    metoprolol succinate (TOPROL-XL) 25 MG 24 hr tablet TAKE 1 TABLET BY MOUTH TWICE DAILY    multivitamin capsule Take 1 capsule by mouth once daily.      pravastatin (PRAVACHOL) 40 MG tablet     predniSONE (DELTASONE) 5 MG tablet Take 5 mg by mouth once daily.    lidocaine HCl 2% (LIDOCAINE VISCOUS) 2 % Soln 15ml every 3h as needed for oral pain    vitamin D 185 MG Tab Take 1,000 mg by mouth once daily.      [DISCONTINUED] ramipril (ALTACE) 2.5 MG capsule TAKE ONE CAPSULE BY MOUTH EVERY DAY    [DISCONTINUED] tizanidine (ZANAFLEX) 2 MG tablet Take 2 tablets (4 mg total) by mouth every 6 (six) hours as needed.     Family History     Problem Relation (Age of Onset)    Cancer Maternal Uncle    Diabetes Mother    Heart  disease Mother        Tobacco Use    Smoking status: Never Smoker    Smokeless tobacco: Never Used    Tobacco comment: Lives with son, extended family.  Three children.   Substance and Sexual Activity    Alcohol use: Not Currently     Comment: only during holidays    Drug use: No    Sexual activity: Never     Review of Systems   Unable to perform ROS: Dementia     Objective:     Vital Signs (Most Recent):  Temp: 97.7 °F (36.5 °C) (01/09/20 1115)  Pulse: 83 (01/09/20 1115)  Resp: 18 (01/09/20 1115)  BP: (!) 154/65 (01/09/20 1115)  SpO2: 97 % (01/09/20 1115) Vital Signs (24h Range):  Temp:  [97.6 °F (36.4 °C)-98.5 °F (36.9 °C)] 97.7 °F (36.5 °C)  Pulse:  [75-95] 83  Resp:  [16-21] 18  SpO2:  [97 %-100 %] 97 %  BP: (125-169)/(60-77) 154/65     Weight: 54.4 kg (120 lb)  Body mass index is 20.6 kg/m².    Physical Exam   Constitutional:   Ill and frail elderly   Eyes: EOM are normal.   Neck: Neck supple.   Oral ulcers noted  Cardiovascular: Normal rate and regular rhythm.   Pulmonary/Chest: Effort normal and breath sounds normal.   Abdominal: Soft. Bowel sounds are normal. She exhibits no distension.   Musculoskeletal: Normal range of motion.   Neurological: She is alert.   Oriented to self place. Not oriented to time and situation. Currently pleasant and no hallucinations  Diffuse generalized weakness.   Skin: Rash noted.   Left eye bruise resolving from fall in Dec  Impressed diffuse lesions consisting of crusted/scabbed lesion, to unroof blisters to blister sparing head, buttock and perineum. Scattered erythematous area with excoriation as well.   Left heel DTI  Right and Left elbow tears and bruises    No webs or breakdown btw fingers   Psychiatric:   dementia        Significant Labs: All pertinent labs within the past 24 hours have been reviewed.    Significant Imaging: I have reviewed and interpreted all pertinent imaging results/findings within the past 24 hours.    Assessment/Plan:     * Altered mental  "status  Per patient, history of dementia with visual auditory hallucinations x 2 years. Baseline oriented to self and place but not time.   EKG accelerated Junctional 72. Troponin x1  Lactate normal.  UA no infection. CTH no acute abnormality.   Contributing to falls (Dec 4, Jan 3, and yesterday Jan 8 x 2 ). All the falls have been unwitnessed as patient lives alone.  Currently mental status at baseline  PT/OT consult    Addendum   Discussed with Daughter Ayse code status-DNR. Nurse Merly present during conversation.     Rash and other nonspecific skin eruption  See physical exam. Patient was seen by Dermatology Dr. Hinton in Sept for similar lesion but not this extensive. Lesion cleared with steroid injections? X 2. Daughter Ayse reports biopsy showed "Dermatitis of unknown origin."   Denies any new antibiotics up untial 12/29 placed on clindamycin for "cellulits" when seen by [facility].   Will hold clindamycin  Consult Dermatology -Called office [phone] at approx 1030 and again 1100 however provider will not available until 1 pm.  Consult wound care to assist.     Addendum [id number]  Dermatology recommended start IV and topical steroids for Pemphigus Vulgaris.   IV solumedrol 30 mg IV BID x 5 days  Triamcinolone 0.1 % cream BID   For punch biopsy tomorrow   If no improvement in 5 days, may need to start IVIG.       Chronic combined systolic and diastolic heart failure  Euvolemic. Not on diuretics at home.  Continue metoprolol. Resume ACEI/ARB if renal function stable and BP tolerates.       Pressure injury of left heel, unstageable  Apply boot. Wound care consult       Recurrent falls  See above #1.       Diffuse Skin Breakdown and Lesions  See above      CKD stage 2 due to type 2 diabetes mellitus  Stable. Repeat labs pending.       Anemia  Obtain iron studies, vitamin B 12, folate level      Diabetic ketoacidosis without coma associated with type 2 diabetes mellitus  Lab Results   Component Value Date    " HGBA1C 10.2 (H) 01/21/2018   obtain A1c.   SSI and adjust accordingly        AICD (automatic cardioverter/defibrillator) present  No current issues.       Essential hypertension  Continue metoprolol. Resume ramipril ? Hydralazine prn.       CAD (coronary artery disease)  Daughter Ayse reports history of MI/stents/AICD/PPM but not sure dates and previous test.   Denies chest pain or shortness of breath  Continue ASA, plavix, metoprolol. Previously on ramipril.      VTE Risk Mitigation (From admission, onward)         Ordered     IP VTE HIGH RISK PATIENT  Once      01/09/20 0815     Place CHANO hose  Until discontinued      01/09/20 0815     Place sequential compression device  Until discontinued      01/09/20 0815                   Tiffany Clark NP  Department of Hospital Medicine   [facility]

## 2020-01-09 NOTE — ASSESSMENT & PLAN NOTE
Per patient, history of dementia with visual auditory hallucinations x 2 years. Baseline oriented to self and place but not time.   EKG accelerated Junctional 72. Troponin x1  Lactate normal.  UA no infection. CTH no acute abnormality.   Contributing to falls (Dec 4, Jan 3, and yesterday Jaison 8 x 2 ). All the falls have been unwitnessed as patient lives alone.  Currently mental status at baseline  PT/OT consult

## 2020-01-09 NOTE — ED NOTES
Pt disconnected from monitor and attached to tele monitor at this time. Pt stable for transport to the floor.

## 2020-01-09 NOTE — ASSESSMENT & PLAN NOTE
"  See physical exam. Patient was seen by Dermatology Dr. Hinton in Sept for similar lesion but not this extensive. Lesion cleared with steroid injections? X 2. Daughter Ayse reports biopsy showed "Dermatitis of unknown origin."   Denies any new antibiotics up untial 12/29 placed on clindamycin for "cellulits" when seen by [facility].   Will hold clindamycin  Consult Dermatology -Called office [phone] at approx 1030 and again 1100 however provider will not available until 1 pm.  Consult wound care to assist.       Dermatology recommended start IV and topical steroids for Pemphigus Vulgaris.   IV solumedrol 30 mg IV BID x 5 days  Triamcinolone 0.1 % cream BID   For punch biopsy today  If no improvement in 5 days, may need to start IVIG.       "

## 2020-01-09 NOTE — ASSESSMENT & PLAN NOTE
Euvolemic. Not on diuretics at home.  Continue metoprolol. Resume ACEI/ARB if renal function stable and BP tolerates.

## 2020-01-09 NOTE — CONSULTS
"Spoke with Hospitalist ADRIÁN Allen prior to seeing patient.   Family had to leave but will be back per nurse.   Patient is alert and oriented to self and knows she is at the hospital. Initially she seems appropriate in conversation but then will say something and you realize she has Dementia. She has a bruise under her left eye from a fall last month and states she was falling and then put her hand in front of her face and fell on it. She has multiple excoriated, erythematous, erosions on torso, and extremities and when asked if she is itching she states " not much" but she had Benadryl in ED. She does c/o intermittent, sharp pain 10/10 in side but when asked her to point to area she put her hand on her left chest area.   Denies feelings of anxiety or depression. Tariq SOB, nausea, diarrhea or constipation.   Patient does understand she has a heart condition but not the extent of and knows she has an AICD.   Will check back to see if family have arrived  Asked nurse to give Tylenol. If Tylenol does not help pain I will order Norco or Percocet as patient has taken many times in past per PCP notes and done well with this.      >50 mins spent in face to face encounter, review of coordination, symptom management and coordination of care                     Addendum: 1500    Patient will be admitted to hospital due to dx of pemphigus vulgaris.   I will f/u tomorrow                "

## 2020-01-09 NOTE — PROGRESS NOTES
CHARLENE completed     1220- call placed to complete LOCET with Sosa    1230- faxed PASRR to [facility] to obtain 142 via email. TN to follow email for 142.

## 2020-01-09 NOTE — PT/OT/SLP PROGRESS
Physical Therapy      Patient Name:  Ruthann Doe   MRN:  [id number]    Patient not seen today secondary to NP Konstantin's request to hold today 2/2 pt c/ severe rash and currently very uncomfortable. Will follow-up tomorrow at NP's request.    Tia Disla, PT

## 2020-01-09 NOTE — ED NOTES
Patient placed on continuous cardiac monitor, automatic blood pressure cuff and continuous pulse oximeter. Pt uncooperative with leaving leads and cords in place

## 2020-01-09 NOTE — ED TRIAGE NOTES
"Pt presents to ED via EMS from home where she lives alone. Son-in-law at bedside, reports finding pt on floor 2x today. Pt's family reports 3 falls this week. Pt denies hitting her head either time. Pt has scabby rash to entire body     Pt reports chronic joint pain, denies any new pain d/t fall.     Pt denies N/V/D, SOB, CP.     PMHx "advanced dementia" per son-in-law.     Pt is oriented to self, does not recall falling but knows she is at the hospital, able to verbalize and follow commands, ambulates with a walker/cane/wheelchair.     Family at the bedside.       "

## 2020-01-09 NOTE — NURSING
Pt with rash, anterior and posterior body. Pt resting in stretcher at this time in NAD other than continued itching. Pt requested lights to be dimmed at this time. Provided dose of benadryl for itching.

## 2020-01-09 NOTE — ASSESSMENT & PLAN NOTE
Lab Results   Component Value Date    HGBA1C 10.2 (H) 01/21/2018   obtain A1c.   SSI and adjust accordingly

## 2020-01-09 NOTE — SUBJECTIVE & OBJECTIVE
Past Medical History:   Diagnosis Date    Arthritis     Rheumatoid  arthritis    Cardiac arrhythmia     Coronary artery disease     Diabetes mellitus type II     Pneumonia     as a kid, with empyema    Renal insufficiency     Rheumatoid arthritis(714.0)        Past Surgical History:   Procedure Laterality Date    CARDIAC DEFIBRILLATOR PLACEMENT      CORONARY ARTERY BYPASS GRAFT      FOOT SURGERY       4 para 3       HYSTERECTOMY      osteomyelitis      TONSILLECTOMY      TOTAL KNEE ARTHROPLASTY      TOTAL SHOULDER ARTHROPLASTY         Review of patient's allergies indicates:   Allergen Reactions    Sulfa (sulfonamide antibiotics) Hives    Percocet [oxycodone-acetaminophen] Nausea And Vomiting       No current facility-administered medications on file prior to encounter.      Current Outpatient Medications on File Prior to Encounter   Medication Sig    aspirin (ECOTRIN) 325 MG EC tablet Take 325 mg by mouth once daily.      clindamycin (CLEOCIN) 300 MG capsule Take 300 mg by mouth 3 (three) times daily.    clopidogrel (PLAVIX) 75 mg tablet Take 1 tablet (75 mg total) by mouth once daily.    glimepiride (AMARYL) 1 MG tablet     linaGLIPtin (TRADJENTA) 5 mg Tab tablet Take 5 mg by mouth once daily.    metFORMIN (GLUCOPHAGE) 1000 MG tablet Take 1,000 mg by mouth 2 (two) times daily with meals.    metoprolol succinate (TOPROL-XL) 25 MG 24 hr tablet TAKE 1 TABLET BY MOUTH TWICE DAILY    multivitamin capsule Take 1 capsule by mouth once daily.      pravastatin (PRAVACHOL) 40 MG tablet     predniSONE (DELTASONE) 5 MG tablet Take 5 mg by mouth once daily.    lidocaine HCl 2% (LIDOCAINE VISCOUS) 2 % Soln 15ml every 3h as needed for oral pain    vitamin D 185 MG Tab Take 1,000 mg by mouth once daily.      [DISCONTINUED] ramipril (ALTACE) 2.5 MG capsule TAKE ONE CAPSULE BY MOUTH EVERY DAY    [DISCONTINUED] tizanidine (ZANAFLEX) 2 MG tablet Take 2 tablets (4 mg total) by mouth every 6 (six)  hours as needed.     Family History     Problem Relation (Age of Onset)    Cancer Maternal Uncle    Diabetes Mother    Heart disease Mother        Tobacco Use    Smoking status: Never Smoker    Smokeless tobacco: Never Used    Tobacco comment: Lives with son, extended family.  Three children.   Substance and Sexual Activity    Alcohol use: Not Currently     Comment: only during holidays    Drug use: No    Sexual activity: Never     Review of Systems   Unable to perform ROS: Dementia     Objective:     Vital Signs (Most Recent):  Temp: 97.7 °F (36.5 °C) (01/09/20 1115)  Pulse: 83 (01/09/20 1115)  Resp: 18 (01/09/20 1115)  BP: (!) 154/65 (01/09/20 1115)  SpO2: 97 % (01/09/20 1115) Vital Signs (24h Range):  Temp:  [97.6 °F (36.4 °C)-98.5 °F (36.9 °C)] 97.7 °F (36.5 °C)  Pulse:  [75-95] 83  Resp:  [16-21] 18  SpO2:  [97 %-100 %] 97 %  BP: (125-169)/(60-77) 154/65     Weight: 54.4 kg (120 lb)  Body mass index is 20.6 kg/m².    Physical Exam   Constitutional:   Ill and frail elderly   Eyes: EOM are normal.   Neck: Neck supple.   Cardiovascular: Normal rate and regular rhythm.   Pulmonary/Chest: Effort normal and breath sounds normal.   Abdominal: Soft. Bowel sounds are normal. She exhibits no distension.   Musculoskeletal: Normal range of motion.   Neurological: She is alert.   Oriented to self place. Not oriented to time and situation. Currently pleasant and no hallucinations  Diffuse generalized weakness.   Skin: Rash noted.   Left eye bruise resolving from fall in Dec  Impressed diffuse lesions consisting of crusted/scabbed lesion, to unroof blisters to blister sparing head, buttock and perineum. Scattered erythematous area with excoriation as well.   Left heel DTI  Right and Left elbow tears and bruises    No webs or breakdown btw fingers   Psychiatric:   dementia        Significant Labs: All pertinent labs within the past 24 hours have been reviewed.    Significant Imaging: I have reviewed and interpreted all  pertinent imaging results/findings within the past 24 hours.

## 2020-01-09 NOTE — PROGRESS NOTES
Pt clinicals sent via [facility] to [facility] facilities on the [facility] and will follow up with pts family when additional information available

## 2020-01-10 PROBLEM — L10.0 PEMPHIGUS VULGARIS: Status: ACTIVE | Noted: 2020-01-09

## 2020-01-10 PROBLEM — L10.0 PEMPHIGUS VULGARIS: Status: ACTIVE | Noted: 2020-01-10

## 2020-01-10 PROBLEM — F03.90 DEMENTIA WITHOUT BEHAVIORAL DISTURBANCE: Status: ACTIVE | Noted: 2020-01-09

## 2020-01-10 PROBLEM — D63.8 ANEMIA, CHRONIC DISEASE: Status: ACTIVE | Noted: 2020-01-09

## 2020-01-10 PROBLEM — E11.65 TYPE 2 DIABETES MELLITUS WITH HYPERGLYCEMIA: Status: ACTIVE | Noted: 2018-01-20

## 2020-01-10 LAB
ALBUMIN SERPL BCP-MCNC: 2.9 G/DL (ref 3.5–5.2)
ALP SERPL-CCNC: 57 U/L (ref 55–135)
ALT SERPL W/O P-5'-P-CCNC: 13 U/L (ref 10–44)
ANION GAP SERPL CALC-SCNC: 10 MMOL/L (ref 8–16)
ANION GAP SERPL CALC-SCNC: 10 MMOL/L (ref 8–16)
AST SERPL-CCNC: 13 U/L (ref 10–40)
BASOPHILS # BLD AUTO: 0.01 K/UL (ref 0–0.2)
BASOPHILS # BLD AUTO: 0.01 K/UL (ref 0–0.2)
BASOPHILS NFR BLD: 0.2 % (ref 0–1.9)
BASOPHILS NFR BLD: 0.2 % (ref 0–1.9)
BILIRUB SERPL-MCNC: 0.4 MG/DL (ref 0.1–1)
BUN SERPL-MCNC: 15 MG/DL (ref 8–23)
BUN SERPL-MCNC: 15 MG/DL (ref 8–23)
CALCIUM SERPL-MCNC: 8.7 MG/DL (ref 8.7–10.5)
CALCIUM SERPL-MCNC: 8.7 MG/DL (ref 8.7–10.5)
CHLORIDE SERPL-SCNC: 101 MMOL/L (ref 95–110)
CHLORIDE SERPL-SCNC: 101 MMOL/L (ref 95–110)
CO2 SERPL-SCNC: 22 MMOL/L (ref 23–29)
CO2 SERPL-SCNC: 22 MMOL/L (ref 23–29)
CREAT SERPL-MCNC: 1.2 MG/DL (ref 0.5–1.4)
CREAT SERPL-MCNC: 1.2 MG/DL (ref 0.5–1.4)
DIFFERENTIAL METHOD: ABNORMAL
DIFFERENTIAL METHOD: ABNORMAL
EOSINOPHIL # BLD AUTO: 0 K/UL (ref 0–0.5)
EOSINOPHIL # BLD AUTO: 0 K/UL (ref 0–0.5)
EOSINOPHIL NFR BLD: 0 % (ref 0–8)
EOSINOPHIL NFR BLD: 0 % (ref 0–8)
ERYTHROCYTE [DISTWIDTH] IN BLOOD BY AUTOMATED COUNT: 13.6 % (ref 11.5–14.5)
ERYTHROCYTE [DISTWIDTH] IN BLOOD BY AUTOMATED COUNT: 13.6 % (ref 11.5–14.5)
EST. GFR  (AFRICAN AMERICAN): 50 ML/MIN/1.73 M^2
EST. GFR  (AFRICAN AMERICAN): 50 ML/MIN/1.73 M^2
EST. GFR  (NON AFRICAN AMERICAN): 43 ML/MIN/1.73 M^2
EST. GFR  (NON AFRICAN AMERICAN): 43 ML/MIN/1.73 M^2
ESTIMATED AVG GLUCOSE: 166 MG/DL (ref 68–131)
FERRITIN SERPL-MCNC: 106 NG/ML (ref 20–300)
FOLATE SERPL-MCNC: 11.9 NG/ML (ref 4–24)
GLUCOSE SERPL-MCNC: 514 MG/DL (ref 70–110)
GLUCOSE SERPL-MCNC: 514 MG/DL (ref 70–110)
HBA1C MFR BLD HPLC: 7.4 % (ref 4–5.6)
HCT VFR BLD AUTO: 28.9 % (ref 37–48.5)
HCT VFR BLD AUTO: 28.9 % (ref 37–48.5)
HGB BLD-MCNC: 9.4 G/DL (ref 12–16)
HGB BLD-MCNC: 9.4 G/DL (ref 12–16)
IMM GRANULOCYTES # BLD AUTO: 0.01 K/UL (ref 0–0.04)
IMM GRANULOCYTES # BLD AUTO: 0.01 K/UL (ref 0–0.04)
IMM GRANULOCYTES NFR BLD AUTO: 0.2 % (ref 0–0.5)
IMM GRANULOCYTES NFR BLD AUTO: 0.2 % (ref 0–0.5)
IRON SERPL-MCNC: 10 UG/DL (ref 30–160)
LYMPHOCYTES # BLD AUTO: 0.5 K/UL (ref 1–4.8)
LYMPHOCYTES # BLD AUTO: 0.5 K/UL (ref 1–4.8)
LYMPHOCYTES NFR BLD: 7.8 % (ref 18–48)
LYMPHOCYTES NFR BLD: 7.8 % (ref 18–48)
MCH RBC QN AUTO: 28.1 PG (ref 27–31)
MCH RBC QN AUTO: 28.1 PG (ref 27–31)
MCHC RBC AUTO-ENTMCNC: 32.5 G/DL (ref 32–36)
MCHC RBC AUTO-ENTMCNC: 32.5 G/DL (ref 32–36)
MCV RBC AUTO: 86 FL (ref 82–98)
MCV RBC AUTO: 86 FL (ref 82–98)
MONOCYTES # BLD AUTO: 0.6 K/UL (ref 0.3–1)
MONOCYTES # BLD AUTO: 0.6 K/UL (ref 0.3–1)
MONOCYTES NFR BLD: 10.3 % (ref 4–15)
MONOCYTES NFR BLD: 10.3 % (ref 4–15)
NEUTROPHILS # BLD AUTO: 5 K/UL (ref 1.8–7.7)
NEUTROPHILS # BLD AUTO: 5 K/UL (ref 1.8–7.7)
NEUTROPHILS NFR BLD: 81.5 % (ref 38–73)
NEUTROPHILS NFR BLD: 81.5 % (ref 38–73)
NRBC BLD-RTO: 0 /100 WBC
NRBC BLD-RTO: 0 /100 WBC
PLATELET # BLD AUTO: 354 K/UL (ref 150–350)
PLATELET # BLD AUTO: 354 K/UL (ref 150–350)
PMV BLD AUTO: 9.9 FL (ref 9.2–12.9)
PMV BLD AUTO: 9.9 FL (ref 9.2–12.9)
POCT GLUCOSE: 106 MG/DL (ref 70–110)
POCT GLUCOSE: 318 MG/DL (ref 70–110)
POCT GLUCOSE: 392 MG/DL (ref 70–110)
POCT GLUCOSE: 395 MG/DL (ref 70–110)
POTASSIUM SERPL-SCNC: 4 MMOL/L (ref 3.5–5.1)
POTASSIUM SERPL-SCNC: 4 MMOL/L (ref 3.5–5.1)
PROT SERPL-MCNC: 6.3 G/DL (ref 6–8.4)
RBC # BLD AUTO: 3.35 M/UL (ref 4–5.4)
RBC # BLD AUTO: 3.35 M/UL (ref 4–5.4)
SATURATED IRON: 3 % (ref 20–50)
SODIUM SERPL-SCNC: 133 MMOL/L (ref 136–145)
SODIUM SERPL-SCNC: 133 MMOL/L (ref 136–145)
TOTAL IRON BINDING CAPACITY: 334 UG/DL (ref 250–450)
TRANSFERRIN SERPL-MCNC: 226 MG/DL (ref 200–375)
VIT B12 SERPL-MCNC: 158 PG/ML (ref 210–950)
WBC # BLD AUTO: 6.14 K/UL (ref 3.9–12.7)
WBC # BLD AUTO: 6.14 K/UL (ref 3.9–12.7)

## 2020-01-10 PROCEDURE — 63600175 PHARM REV CODE 636 W HCPCS: Performed by: NURSE PRACTITIONER

## 2020-01-10 PROCEDURE — 63600175 PHARM REV CODE 636 W HCPCS: Performed by: HOSPITALIST

## 2020-01-10 PROCEDURE — 21400001 HC TELEMETRY ROOM

## 2020-01-10 PROCEDURE — 94761 N-INVAS EAR/PLS OXIMETRY MLT: CPT

## 2020-01-10 PROCEDURE — 25000003 PHARM REV CODE 250: Performed by: HOSPITALIST

## 2020-01-10 PROCEDURE — 80053 COMPREHEN METABOLIC PANEL: CPT

## 2020-01-10 PROCEDURE — 99232 SBSQ HOSP IP/OBS MODERATE 35: CPT | Mod: ,,, | Performed by: NURSE PRACTITIONER

## 2020-01-10 PROCEDURE — 82607 VITAMIN B-12: CPT

## 2020-01-10 PROCEDURE — 82746 ASSAY OF FOLIC ACID SERUM: CPT

## 2020-01-10 PROCEDURE — 36415 COLL VENOUS BLD VENIPUNCTURE: CPT

## 2020-01-10 PROCEDURE — 25000003 PHARM REV CODE 250: Performed by: NURSE PRACTITIONER

## 2020-01-10 PROCEDURE — 25000003 PHARM REV CODE 250: Performed by: EMERGENCY MEDICINE

## 2020-01-10 PROCEDURE — 83540 ASSAY OF IRON: CPT

## 2020-01-10 PROCEDURE — 85025 COMPLETE CBC W/AUTO DIFF WBC: CPT

## 2020-01-10 PROCEDURE — 82728 ASSAY OF FERRITIN: CPT

## 2020-01-10 PROCEDURE — 97165 OT EVAL LOW COMPLEX 30 MIN: CPT

## 2020-01-10 PROCEDURE — 99232 PR SUBSEQUENT HOSPITAL CARE,LEVL II: ICD-10-PCS | Mod: ,,, | Performed by: NURSE PRACTITIONER

## 2020-01-10 PROCEDURE — 97161 PT EVAL LOW COMPLEX 20 MIN: CPT

## 2020-01-10 PROCEDURE — C9399 UNCLASSIFIED DRUGS OR BIOLOG: HCPCS | Performed by: HOSPITALIST

## 2020-01-10 RX ORDER — PANTOPRAZOLE SODIUM 40 MG/1
40 TABLET, DELAYED RELEASE ORAL DAILY
Status: DISCONTINUED | OUTPATIENT
Start: 2020-01-10 | End: 2020-01-20 | Stop reason: HOSPADM

## 2020-01-10 RX ORDER — METOPROLOL TARTRATE 1 MG/ML
5 INJECTION, SOLUTION INTRAVENOUS EVERY 5 MIN PRN
Status: DISCONTINUED | OUTPATIENT
Start: 2020-01-10 | End: 2020-01-20 | Stop reason: HOSPADM

## 2020-01-10 RX ORDER — FUROSEMIDE 40 MG/1
40 TABLET ORAL DAILY
Status: DISCONTINUED | OUTPATIENT
Start: 2020-01-10 | End: 2020-01-12

## 2020-01-10 RX ORDER — CARVEDILOL 6.25 MG/1
6.25 TABLET ORAL 2 TIMES DAILY
Status: DISCONTINUED | OUTPATIENT
Start: 2020-01-10 | End: 2020-01-20 | Stop reason: HOSPADM

## 2020-01-10 RX ORDER — INSULIN ASPART 100 [IU]/ML
8 INJECTION, SOLUTION INTRAVENOUS; SUBCUTANEOUS
Status: DISCONTINUED | OUTPATIENT
Start: 2020-01-10 | End: 2020-01-11

## 2020-01-10 RX ORDER — FAMOTIDINE 40 MG/5ML
20 POWDER, FOR SUSPENSION ORAL DAILY
Status: DISCONTINUED | OUTPATIENT
Start: 2020-01-10 | End: 2020-01-10

## 2020-01-10 RX ADMIN — INSULIN ASPART 5 UNITS: 100 INJECTION, SOLUTION INTRAVENOUS; SUBCUTANEOUS at 09:01

## 2020-01-10 RX ADMIN — SENNOSIDES AND DOCUSATE SODIUM 1 TABLET: 8.6; 5 TABLET ORAL at 09:01

## 2020-01-10 RX ADMIN — METHYLPREDNISOLONE SODIUM SUCCINATE 30 MG: 40 INJECTION, POWDER, FOR SOLUTION INTRAMUSCULAR; INTRAVENOUS at 08:01

## 2020-01-10 RX ADMIN — PANTOPRAZOLE SODIUM 40 MG: 40 TABLET, DELAYED RELEASE ORAL at 01:01

## 2020-01-10 RX ADMIN — PRAVASTATIN SODIUM 40 MG: 40 TABLET ORAL at 08:01

## 2020-01-10 RX ADMIN — CARVEDILOL 6.25 MG: 6.25 TABLET, FILM COATED ORAL at 08:01

## 2020-01-10 RX ADMIN — ASPIRIN 325 MG: 325 TABLET, DELAYED RELEASE ORAL at 09:01

## 2020-01-10 RX ADMIN — INSULIN DETEMIR 25 UNITS: 100 INJECTION, SOLUTION SUBCUTANEOUS at 09:01

## 2020-01-10 RX ADMIN — METHYLPREDNISOLONE SODIUM SUCCINATE 30 MG: 40 INJECTION, POWDER, FOR SOLUTION INTRAMUSCULAR; INTRAVENOUS at 09:01

## 2020-01-10 RX ADMIN — INSULIN ASPART 3 UNITS: 100 INJECTION, SOLUTION INTRAVENOUS; SUBCUTANEOUS at 08:01

## 2020-01-10 RX ADMIN — TRIAMCINOLONE ACETONIDE: 1 CREAM TOPICAL at 08:01

## 2020-01-10 RX ADMIN — INSULIN ASPART 8 UNITS: 100 INJECTION, SOLUTION INTRAVENOUS; SUBCUTANEOUS at 11:01

## 2020-01-10 RX ADMIN — FUROSEMIDE 40 MG: 40 TABLET ORAL at 09:01

## 2020-01-10 RX ADMIN — CLOPIDOGREL BISULFATE 75 MG: 75 TABLET ORAL at 09:01

## 2020-01-10 RX ADMIN — TRIAMCINOLONE ACETONIDE: 1 CREAM TOPICAL at 09:01

## 2020-01-10 RX ADMIN — INSULIN ASPART 4 UNITS: 100 INJECTION, SOLUTION INTRAVENOUS; SUBCUTANEOUS at 11:01

## 2020-01-10 RX ADMIN — SENNOSIDES AND DOCUSATE SODIUM 1 TABLET: 8.6; 5 TABLET ORAL at 08:01

## 2020-01-10 RX ADMIN — CARVEDILOL 6.25 MG: 6.25 TABLET, FILM COATED ORAL at 09:01

## 2020-01-10 NOTE — PROGRESS NOTES
[facility] Hospital Medicine  Progress Note    Patient Name: Ruthann Doe  MRN: [id number]  Patient Class: IP- Inpatient   Admission Date: 1/8/2020  Length of Stay: 1 days  Attending Physician: Michel Woody MD  Primary Care Provider: Jesse Partida MD        Subjective:     Principal Problem:Pemphigus vulgaris        HPI:  Mrs. Doe is a 78 yo female with significant hypertension, hyperlipidemia, diabetes type 2, dementia, PPM/AICD?, CAD status post MI, chronic systolic diastolic heart failure, and pulmonary hypertension who was brought by Daughter Ayse to hospital for worsen dementia, recurrent falls and need for NHP. Patient lives alone. Daughter Ayse have been estranged from patient for over 10 years but stepped in few years ago to assist. Daughter Ayse reports patient dementia with visual /aduitory hallucinations for over 2 years and consisting of dead people and cats for 2 years. Baseline ambulates with walker up until 10 days ago.    Patient also found to have diffuse body lesions consisting for blisters and scab over lesion through out body sparring the buttock and perineum. Patient had similar lesions in Sept however only on torso which cleared up with steroid injections in Sept and October. By end of November, lesions completely resolved. 10 days ago patient was brought to [facility] then told had cellulitis prescribed clindamycin 300 mg TID.    CXR no acute process. CTH  No acute intracranial abnormality. Afebrile and no leukocytosis. UA no evidence of infection.     Overview/Hospital Course:  Mrs. Doe is a 78 yo female with significant hypertension, hyperlipidemia, diabetes type 2, dementia, PPM/AICD?, CAD status post MI, chronic systolic diastolic heart failure, and pulmonary hypertension who was brought by Daughter Ayse to hospital for worsen dementia, recurrent falls and need for NHP. Patient lives alone. Daughter Ayse have been estranged from  patient for over 10 years but stepped in few years ago to assist. Daughter Ayse reports patient dementia with visual /aduitory hallucinations for over 2 years and consisting of dead people and cats for 2 years. Baseline ambulates with walker up until 10 days ago.    Patient also found to have diffuse body lesions consisting for blisters and scab over lesion through out body sparring the buttock and perineum. Patient had similar lesions in Sept however only on torso which cleared up with steroid injections in Sept and October. By end of November, lesions completely resolved. 10 days ago patient was brought to Urgent [facility] then told had cellulitis prescribed clindamycin 300 mg TID.    CXR no acute process. CTH  No acute intracranial abnormality. Afebrile and no leukocytosis. UA no evidence of infection.     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 Rec. And SW for NH placement.    Past Medical History:   Diagnosis Date    Arthritis     Rheumatoid  arthritis    Cardiac arrhythmia     Coronary artery disease     Diabetes mellitus type II     Pneumonia     as a kid, with empyema    Renal insufficiency     Rheumatoid arthritis(714.0)        Past Surgical History:   Procedure Laterality Date    CARDIAC DEFIBRILLATOR PLACEMENT      CORONARY ARTERY BYPASS GRAFT      FOOT SURGERY       4 para 3       HYSTERECTOMY      osteomyelitis      TONSILLECTOMY      TOTAL KNEE ARTHROPLASTY      TOTAL SHOULDER ARTHROPLASTY         Review of patient's allergies indicates:   Allergen Reactions    Sulfa (sulfonamide antibiotics) Hives       No current facility-administered medications on file prior to encounter.      Current Outpatient Medications on File Prior to Encounter   Medication Sig    aspirin (ECOTRIN) 325  MG EC tablet Take 325 mg by mouth once daily.      clopidogrel (PLAVIX) 75 mg tablet Take 1 tablet (75 mg total) by mouth once daily.    glimepiride (AMARYL) 1 MG tablet     linaGLIPtin (TRADJENTA) 5 mg Tab tablet Take 5 mg by mouth once daily.    metFORMIN (GLUCOPHAGE) 1000 MG tablet Take 1,000 mg by mouth 2 (two) times daily with meals.    metoprolol succinate (TOPROL-XL) 25 MG 24 hr tablet TAKE 1 TABLET BY MOUTH TWICE DAILY    multivitamin capsule Take 1 capsule by mouth once daily.      pravastatin (PRAVACHOL) 40 MG tablet     predniSONE (DELTASONE) 5 MG tablet Take 5 mg by mouth once daily.    lidocaine HCl 2% (LIDOCAINE VISCOUS) 2 % Soln 15ml every 3h as needed for oral pain    vitamin D 185 MG Tab Take 1,000 mg by mouth once daily.       Family History     Problem Relation (Age of Onset)    Cancer Maternal Uncle    Diabetes Mother    Heart disease Mother        Tobacco Use    Smoking status: Never Smoker    Smokeless tobacco: Never Used    Tobacco comment: Lives with son, extended family.  Three children.   Substance and Sexual Activity    Alcohol use: Not Currently     Comment: only during holidays    Drug use: No    Sexual activity: Never     Review of Systems   Unable to perform ROS: Dementia     Objective:     Vital Signs (Most Recent):  Temp: 98.9 °F (37.2 °C) (01/10/20 0813)  Pulse: 98 (01/10/20 0813)  Resp: 16 (01/10/20 0813)  BP: (!) 148/65 (01/10/20 0813)  SpO2: 97 % (01/10/20 0813) Vital Signs (24h Range):  Temp:  [97.5 °F (36.4 °C)-98.9 °F (37.2 °C)] 98.9 °F (37.2 °C)  Pulse:  [] 98  Resp:  [16-19] 16  SpO2:  [95 %-99 %] 97 %  BP: (119-154)/(57-65) 148/65     Weight: 55.6 kg (122 lb 9.2 oz)  Body mass index is 21.04 kg/m².    Physical Exam   Constitutional:   Ill and frail elderly   Eyes: EOM are normal.   Neck: Neck supple.   Cardiovascular: Normal rate and regular rhythm.   Pulmonary/Chest: Effort normal and breath sounds normal.   Abdominal: Soft. Bowel sounds are normal.  "She exhibits no distension.   Musculoskeletal: Normal range of motion.   Neurological: She is alert.   Oriented to self place. Not oriented to time and situation. Currently pleasant and no hallucinations  Diffuse generalized weakness.   Skin: Rash noted.   Left eye bruise resolving from fall in Dec  Impressed diffuse lesions consisting of crusted/scabbed lesion, to unroof blisters to blister sparing head, buttock and perineum. Scattered erythematous area with excoriation as well.   Left heel DTI  Right and Left elbow tears and bruises    No webs or breakdown btw fingers   Psychiatric:   dementia        Significant Labs: All pertinent labs within the past 24 hours have been reviewed.    Significant Imaging: I have reviewed and interpreted all pertinent imaging results/findings within the past 24 hours.      Assessment/Plan:      * Pemphigus vulgaris    See physical exam. Patient was seen by Dermatology Dr. Hinton in Sept for similar lesion but not this extensive. Lesion cleared with steroid injections? X 2. Daughter Ayse reports biopsy showed "Dermatitis of unknown origin."   Denies any new antibiotics up untial 12/29 placed on clindamycin for "cellulits" when seen by [facility].   Will hold clindamycin  Consult Dermatology -Called office [phone] at approx 1030 and again 1100 however provider will not available until 1 pm.  Consult wound care to assist.       Dermatology recommended start IV and topical steroids for Pemphigus Vulgaris.   IV solumedrol 30 mg IV BID x 5 days  Triamcinolone 0.1 % cream BID   For punch biopsy today  If no improvement in 5 days, may need to start IVIG.         Chronic combined systolic and diastolic heart failure  EF of 25-30%,Euvolemic. Not on diuretics at home.  Continue metoprolol. Resume ACEI/ARB if renal function stable and BP tolerates.       Pressure injury of left heel, unstageable  Apply boot. Wound care consult       Recurrent falls  See above #1.       Diffuse Skin " Breakdown and Lesions  See above      CKD stage 2 due to type 2 diabetes mellitus  Stable. Repeat labs pending.       Anemia, chronic disease  Obtain iron studies, vitamin B 12, folate level      Dementia without behavioral disturbance  Per patient, history of dementia with visual auditory hallucinations x 2 years. Baseline oriented to self and place but not time.   EKG accelerated Junctional 72. Troponin x1  Lactate normal.  UA no infection. CTH no acute abnormality.   Contributing to falls (Dec 4, Jan 3, and yesterday Jaison 8 x 2 ). All the falls have been unwitnessed as patient lives alone.  Currently mental status at baseline  PT/OT consult  Consulted  for NH placement.      Type 2 diabetes mellitus with hyperglycemia  Lab Results   Component Value Date    HGBA1C 7.4 (H) 01/09/2020   obtain A1c.   SSI and adjust accordingly,  On prandial ,basal,and SSI at [facility].        AICD (automatic cardioverter/defibrillator) present  No current issues.       Essential hypertension  Continue metoprolol. Resume ramipril ? Hydralazine prn.       CAD (coronary artery disease)  Daughter Ayse reports history of MI/stents/AICD/PPM but not sure dates and previous test.   Denies chest pain or shortness of breath  Continue ASA, plavix, metoprolol. Previously on ramipril.        VTE Risk Mitigation (From admission, onward)         Ordered     IP VTE HIGH RISK PATIENT  Once      01/09/20 0815     Place CHANO hose  Until discontinued      01/09/20 0815     Place sequential compression device  Until discontinued      01/09/20 0815                      Michel Woody MD  Department of Hospital Medicine   [facility]

## 2020-01-10 NOTE — SUBJECTIVE & OBJECTIVE
Past Medical History:   Diagnosis Date    Arthritis     Rheumatoid  arthritis    Cardiac arrhythmia     Coronary artery disease     Diabetes mellitus type II     Pneumonia     as a kid, with empyema    Renal insufficiency     Rheumatoid arthritis(714.0)        Past Surgical History:   Procedure Laterality Date    CARDIAC DEFIBRILLATOR PLACEMENT      CORONARY ARTERY BYPASS GRAFT      FOOT SURGERY       4 para 3       HYSTERECTOMY      osteomyelitis      TONSILLECTOMY      TOTAL KNEE ARTHROPLASTY      TOTAL SHOULDER ARTHROPLASTY         Review of patient's allergies indicates:   Allergen Reactions    Sulfa (sulfonamide antibiotics) Hives       No current facility-administered medications on file prior to encounter.      Current Outpatient Medications on File Prior to Encounter   Medication Sig    aspirin (ECOTRIN) 325 MG EC tablet Take 325 mg by mouth once daily.      clopidogrel (PLAVIX) 75 mg tablet Take 1 tablet (75 mg total) by mouth once daily.    glimepiride (AMARYL) 1 MG tablet     linaGLIPtin (TRADJENTA) 5 mg Tab tablet Take 5 mg by mouth once daily.    metFORMIN (GLUCOPHAGE) 1000 MG tablet Take 1,000 mg by mouth 2 (two) times daily with meals.    metoprolol succinate (TOPROL-XL) 25 MG 24 hr tablet TAKE 1 TABLET BY MOUTH TWICE DAILY    multivitamin capsule Take 1 capsule by mouth once daily.      pravastatin (PRAVACHOL) 40 MG tablet     predniSONE (DELTASONE) 5 MG tablet Take 5 mg by mouth once daily.    lidocaine HCl 2% (LIDOCAINE VISCOUS) 2 % Soln 15ml every 3h as needed for oral pain    vitamin D 185 MG Tab Take 1,000 mg by mouth once daily.       Family History     Problem Relation (Age of Onset)    Cancer Maternal Uncle    Diabetes Mother    Heart disease Mother        Tobacco Use    Smoking status: Never Smoker    Smokeless tobacco: Never Used    Tobacco comment: Lives with son, extended family.  Three children.   Substance and Sexual Activity    Alcohol use: Not  Currently     Comment: only during holidays    Drug use: No    Sexual activity: Never     Review of Systems   Unable to perform ROS: Dementia     Objective:     Vital Signs (Most Recent):  Temp: 98.9 °F (37.2 °C) (01/10/20 0813)  Pulse: 98 (01/10/20 0813)  Resp: 16 (01/10/20 0813)  BP: (!) 148/65 (01/10/20 0813)  SpO2: 97 % (01/10/20 0813) Vital Signs (24h Range):  Temp:  [97.5 °F (36.4 °C)-98.9 °F (37.2 °C)] 98.9 °F (37.2 °C)  Pulse:  [] 98  Resp:  [16-19] 16  SpO2:  [95 %-99 %] 97 %  BP: (119-154)/(57-65) 148/65     Weight: 55.6 kg (122 lb 9.2 oz)  Body mass index is 21.04 kg/m².    Physical Exam   Constitutional:   Ill and frail elderly   Eyes: EOM are normal.   Neck: Neck supple.   Cardiovascular: Normal rate and regular rhythm.   Pulmonary/Chest: Effort normal and breath sounds normal.   Abdominal: Soft. Bowel sounds are normal. She exhibits no distension.   Musculoskeletal: Normal range of motion.   Neurological: She is alert.   Oriented to self place. Not oriented to time and situation. Currently pleasant and no hallucinations  Diffuse generalized weakness.   Skin: Rash noted.   Left eye bruise resolving from fall in Dec  Impressed diffuse lesions consisting of crusted/scabbed lesion, to unroof blisters to blister sparing head, buttock and perineum. Scattered erythematous area with excoriation as well.   Left heel DTI  Right and Left elbow tears and bruises    No webs or breakdown btw fingers   Psychiatric:   dementia        Significant Labs: All pertinent labs within the past 24 hours have been reviewed.    Significant Imaging: I have reviewed and interpreted all pertinent imaging results/findings within the past 24 hours.
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
## 2020-01-10 NOTE — HOSPITAL COURSE
Mrs. Doe is a 80 yo female with significant hypertension, hyperlipidemia, diabetes type 2, dementia, PPM/AICD?, CAD status post MI, chronic systolic diastolic heart failure, and pulmonary hypertension who was brought by Daughter Ayse to hospital for worsen dementia, recurrent falls and need for NHP. Patient lives alone. Daughter Ayse have been estranged from patient for over 10 years but stepped in few years ago to assist. Daughter Ayse reports patient dementia with visual /aduitory hallucinations for over 2 years and consisting of dead people and cats for 2 years. Baseline ambulates with walker up until 10 days ago.    Patient also found to have diffuse body lesions consisting for blisters and scab over lesion through out body sparring the buttock and perineum. Patient had similar lesions in Sept however only on torso which cleared up with steroid injections in Sept and October. By end of November, lesions completely resolved. 10 days ago patient was brought to [facility] then told had cellulitis prescribed clindamycin 300 mg TID.    CXR no acute process. CTH  No acute intracranial abnormality. Afebrile and no leukocytosis. UA no evidence of infection.  PT/OT evaluated the patient and recommended SNF. Patient was found to be in A-fib and cards consulted. No NOAC due to frequent falls and non-compliance.  Dermatology was consulted and diagnosed patient with pemphigus vulgaris.  The patient was started on IV steroids and completed a course of 5 days.  Patient was accepted at [facility]. I spoke with dermatology and nothing more to do other than follow up with them and social workers are aware.  The patient will be discharged to [facility] today. Activity as tolerated. Diet low Na, ADA 1800 jocy diet. Follow up with PCP in one week with derm and cards as well.

## 2020-01-10 NOTE — PLAN OF CARE
Problem: Fall Injury Risk  Goal: Absence of Fall and Fall-Related Injury  Outcome: Ongoing, Progressing  Intervention: Identify and Manage Contributors to Fall Injury Risk  Flowsheets (Taken 1/9/2020 1948)  Self-Care Promotion: independence encouraged; BADL personal objects within reach; BADL personal routines maintained  Medication Review/Management: medications reviewed  Intervention: Promote Injury-Free Environment  Flowsheets (Taken 1/9/2020 1948)  Safety Promotion/Fall Prevention: assistive device/personal item within reach; bed alarm set; commode/urinal/bedpan at bedside; Fall Risk reviewed with patient/family; lighting adjusted; medications reviewed; nonskid shoes/socks when out of bed; side rails raised x 3; room near unit station; instructed to call staff for mobility  Environmental Safety Modification: assistive device/personal items within reach; lighting adjusted; clutter free environment maintained; room near unit station; room organization consistent     Problem: Adult Inpatient Plan of Care  Goal: Plan of Care Review  Outcome: Ongoing, Progressing  Flowsheets (Taken 1/9/2020 1948)  Plan of Care Reviewed With: patient  Goal: Patient-Specific Goal (Individualization)  Outcome: Ongoing, Progressing  Goal: Absence of Hospital-Acquired Illness or Injury  Outcome: Ongoing, Progressing  Goal: Optimal Comfort and Wellbeing  Outcome: Ongoing, Progressing  Goal: Readiness for Transition of Care  Outcome: Ongoing, Progressing  Goal: Rounds/Family Conference  Outcome: Ongoing, Progressing     Problem: Wound  Goal: Optimal Wound Healing  Outcome: Ongoing, Progressing  Intervention: Promote Effective Wound Healing  Flowsheets (Taken 1/9/2020 1948)  Pain Management Interventions: awakened for pain meds per patient request     Problem: Skin Injury Risk Increased  Goal: Skin Health and Integrity  Outcome: Ongoing, Progressing  Intervention: Optimize Skin Protection  Flowsheets (Taken 1/9/2020 1948)  Pressure  Reduction Techniques: frequent weight shift encouraged; heels elevated off bed; positioned off wounds; sit time limited to 2 hours; weight shift assistance provided  Skin Protection: adhesive use limited; drying agents applied; incontinence pads utilized; electrode sites changed; hydrocolloids used  Head of Bed (HOB): HOB flat     Problem: Diabetes Comorbidity  Goal: Blood Glucose Level Within Desired Range  Outcome: Ongoing, Progressing     Problem: Infection  Goal: Infection Symptom Resolution  Outcome: Ongoing, Progressing     Problem: Coping Ineffective  Goal: Effective Coping  Outcome: Ongoing, Progressing

## 2020-01-10 NOTE — PLAN OF CARE
"SW met with patient to complete discharge planning assessment. Prior to beginning the discharge planning assessment, patient verified name and date of birth. SW educated patient on the discharge process and explained that discharge planning begins at admission. SW reviewed "Help at home", "Managing your health", and "Your preferences". Patient stated her mother is her help at home. Patient seemed very confused and talked about her mother and working [place]. It was very hard for patient to stay on topic.  CICI wrote name and phone number on white communication board.     01/10/20 3435   Discharge Assessment   Assessment Type Discharge Planning Assessment   Confirmed/corrected address and phone number on facesheet? Yes   Assessment information obtained from? Patient   Communicated expected length of stay with patient/caregiver yes   Prior to hospitilization cognitive status: Alert/Oriented   Prior to hospitalization functional status: Independent   Current cognitive status: Alert/Oriented   Current Functional Status: Independent   Facility Arrived From: Home   Lives With alone   Able to Return to Prior Arrangements no   Is patient able to care for self after discharge? No   Patient's perception of discharge disposition home or selfcare   Readmission Within the Last 30 Days no previous admission in last 30 days   Patient currently being followed by outpatient case management? No   Patient currently receives any other outside agency services? No   Equipment Currently Used at Home wheelchair;walker, rolling   Do you have any problems affording any of your prescribed medications? No   Is the patient taking medications as prescribed? yes   Does the patient have transportation home? Yes   Transportation Anticipated family or friend will provide   Does the patient receive services at the [facility]? No   Discharge Plan A New Nursing Home placement - senior living care facility   Discharge Plan B New Nursing Home " placement - jail care facility   DME Needed Upon Discharge  none   Patient/Family in Agreement with Plan yes     Jesse Partida MD      [id number] - [place] - [address]  [address]  [place] [address]  Phone: [phone] Fax: [phone]    [id number] - [place] - [address] [facility]  [address] [place] [address]  Phone: [phone] Fax: [phone]      Extended Emergency Contact Information  Primary Emergency Contact: Ayse Sutherland   [place]  Mobile Phone: [phone]  Relation: Daughter

## 2020-01-10 NOTE — PLAN OF CARE
01/10/20 1604   Discharge Reassessment   Assessment Type Discharge Planning Reassessment   Provided patient/caregiver education on the expected discharge date and the discharge plan Yes   142 received.  DC plan remains NH placement.  No accepting facility as yet.

## 2020-01-10 NOTE — PLAN OF CARE
Problem: Physical Therapy Goal  Goal: Physical Therapy Goal  Description  Goals to be met by: 2020     Patient will increase functional independence with mobility by performin. Supine to sit with [place]  2. Sit to supine with [place]  3. Sit to stand transfer with Modified [place]  4. Bed to chair transfer with Modified [place]   5. Gait  x 200 feet with Stand-by Assistance using Rolling Walker.   6. Stand for 10 minutes with Supervision using no device  7. Increased functional strength to WFL .     Outcome: Ongoing, Progressing    PT eval completed. 5x/week. Recommend PT at next level of care.

## 2020-01-10 NOTE — PT/OT/SLP EVAL
Physical Therapy Evaluation    Patient Name:  Ruthann Doe   MRN:  [id number]    Recommendations:     Discharge Recommendations:  (PT at next level of care)   Discharge Equipment Recommendations: none   Barriers to discharge: Decreased caregiver support    Assessment:     Ruthann Doe is a 79 y.o. female admitted with a medical diagnosis of Pemphigus vulgaris.  She presents with the following impairments/functional limitations:  weakness, impaired functional mobilty, impaired cognition, decreased safety awareness, impaired endurance, gait instability, pain, impaired balance, impaired self care skills, decreased lower extremity function   Pt admitted after multiple falls at home. Not safe to be alone when OOB walking or transferring. Poor insight and safety awareness.     Rehab Prognosis: Fair; patient would benefit from acute skilled PT services to address these deficits and reach maximum level of function.    Recent Surgery: * No surgery found *      Plan:     During this hospitalization, patient to be seen 5 x/week to address the identified rehab impairments via gait training, therapeutic activities, therapeutic exercises and progress toward the following goals:    · Plan of Care Expires:  01/24/20    Subjective     Chief Complaint: wants to find her shoes  Patient/Family Comments/goals: not able to state  Pain/Comfort:  · Pain Rating 1: (anders baker 8, back rash)    Patients cultural, spiritual, Pentecostalism conflicts given the current situation: no    Living Environment:  Home alone; + dtr and son in law check in daily.   Prior to admission, patients level of function was amb c/ RW Ind.  Equipment used at home: walker, rolling.  DME owned (not currently used): rolling walker.  Upon discharge, patient will have assistance from n/a.    Objective:     Communicated with RN prior to session.  Patient found supine with    upon PT entry to room.    General Precautions: Standard, fall   Orthopedic  Precautions:N/A   Braces: N/A     Exams:  · Cognitive Exam:  Patient is oriented to Person  · Gross Motor Coordination:  WFL  · Postural Exam:  Patient presented with the following abnormalities:    · -       No postural abnormalities identified  · Sensation:    · -       Intact  · Skin Integrity/Edema:      · -       generalized rash c/ open blisters  · RLE ROM: WFL  · RLE Strength: WFL  · LLE ROM: WFL  · LLE Strength: WFL    Functional Mobility:  · Bed Mobility:     · Supine to Sit: modified independence  · Transfers:     · Sit to Stand:  stand by assistance with no AD  · Gait: training c/ RW and CGA approximately 100 ft for safety 2/2 confusion; cues for navigation and attention to task.   · Balance: fair+ c/ BUE support on RW     Able to walk and talk without SOB on RA O2.   Pt needs constant redirection 2/2 confusion. + anxious.   Safe to amb c/ unit staff to toilet over w/e c/ RW.       Therapeutic Activities and Exercises:   n/a    AM-PAC 6 CLICK MOBILITY  Total Score:20     Patient left HOB elevated with bed alarm on.    GOALS:   Multidisciplinary Problems     Physical Therapy Goals        Problem: Physical Therapy Goal    Goal Priority Disciplines Outcome Goal Variances Interventions   Physical Therapy Goal     PT, PT/OT Ongoing, Progressing     Description:  Goals to be met by: 2020     Patient will increase functional independence with mobility by performin. Supine to sit with Williamson  2. Sit to supine with Williamson  3. Sit to stand transfer with Modified Williamson  4. Bed to chair transfer with Modified Williamson   5. Gait  x 200 feet with Stand-by Assistance using Rolling Walker.   6. Stand for 10 minutes with Supervision using no device                        History:     Past Medical History:   Diagnosis Date    Arthritis     Rheumatoid  arthritis    Cardiac arrhythmia     Coronary artery disease     Diabetes mellitus type II     Pneumonia     as a kid, with empyema     Renal insufficiency     Rheumatoid arthritis(714.0)        Past Surgical History:   Procedure Laterality Date    CARDIAC DEFIBRILLATOR PLACEMENT      CORONARY ARTERY BYPASS GRAFT      FOOT SURGERY       4 para 3       HYSTERECTOMY      osteomyelitis      TONSILLECTOMY      TOTAL KNEE ARTHROPLASTY      TOTAL SHOULDER ARTHROPLASTY         Time Tracking:     PT Received On: 01/10/20  PT Start Time: 1058     PT Stop Time: 1115  PT Total Time (min): 17 min     Billable Minutes: Evaluation 17      Tia Disla, PT  01/10/2020

## 2020-01-10 NOTE — ASSESSMENT & PLAN NOTE
EF of 25-30%,Euvolemic. Not on diuretics at home.  Continue metoprolol. Resume ACEI/ARB if renal function stable and BP tolerates.

## 2020-01-10 NOTE — PT/OT/SLP EVAL
"Occupational Therapy   Evaluation    Name: Ruthann Doe  MRN: [id number]  Admitting Diagnosis:  Pemphigus vulgaris      Recommendations:     Discharge Recommendations: nursing facility, skilled  Discharge Equipment Recommendations:  walker, rolling, bedside commode  Barriers to discharge:  Decreased caregiver support    Assessment:     Ruthann Doe is a 79 y.o. female with a medical diagnosis of Pemphigus vulgaris.  She presents with  independence for self-care and functional transfers. Performance deficits affecting function: weakness, impaired endurance, impaired self care skills, impaired functional mobilty, impaired balance, impaired cognition, decreased safety awareness, decreased upper extremity function, impaired cardiopulmonary response to activity.      Rehab Prognosis: Good; patient would benefit from acute skilled OT services to address these deficits and reach maximum level of function.       Plan:     Patient to be seen 5 x/week(M-F) to address the above listed problems via therapeutic activities, therapeutic exercises  · Plan of Care Expires: 20  · Plan of Care Reviewed with: patient    Subjective     Chief Complaint: "My skin itches and the sores hurt."  Patient/Family Comments/goals: to get better    Occupational Profile:  Living Environment: lives alone, family provided meals  Previous level of function: modified independent  Roles and Routines: chart states multiple falls, some injuries  Equipment Used at Home:  none  Assistance upon Discharge: will benefit from SNF placement to increase functional independence     Pain/Comfort:  · Pain Rating 1: other (see comments)(No c/o pain however is itching from skin rash; did yell out while changing gown as gown was stuck on open lesion on her back)    Patients cultural, spiritual, Jehovah's witness conflicts given the current situation: no    Objective:     Communicated with: nurse Mulligan prior to session.  Patient found supine with " telemetry, peripheral IV upon OT entry to room.    General Precautions: Standard, fall   Orthopedic Precautions:N/A   Braces: N/A     Occupational Performance:    Bed Mobility:    · Patient completed Rolling/Turning to Left with  minimum assistance  · Patient completed Rolling/Turning to Right with minimum assistance  · Patient completed Scooting/Bridging with minimum assistance  · Patient completed Supine to Sit with minimum assistance  · Patient completed Sit to Supine with minimum assistance    Functional Mobility/Transfers:  · Patient completed Sit <> Stand Transfer with contact guard assistance  with  rolling walker   · Functional Mobility: ambulated in the borjas with a RW, see PT note for details    Activities of Daily Living:  · Grooming: modified independence seated EOB  · Upper Body Dressing: stand by assistance seated EOB  · Lower Body Dressing: stand by assistance donned socks seated EOB    Cognitive/Visual Perceptual:  Cognitive/Psychosocial Skills:     -       Oriented to: Person and Time   -       Follows Commands/attention:Easily distracted and Follows one-step commands  -       Communication: clear/fluent  -       Memory: Impaired STM and Poor immediate recall  -       Safety awareness/insight to disability: impaired   -       Mood/Affect/Coping skills/emotional control: Appropriate to situation    Physical Exam:  Balance:    -       sit balance good; standing balance fair plus  Postural examination/scapula alignment:    -       Rounded shoulders  Skin integrity: Wound all over her body, being treated by dermatolgy  Upper Extremity Range of Motion:     -       Right Upper Extremity: WFL  -       Left Upper Extremity: WFL  Upper Extremity Strength:    -       Right Upper Extremity: WFL  -       Left Upper Extremity: WFL    AMPAC 6 Click ADL:  AMPAC Total Score: 16    Treatment & Education:  Evaluation; Patient will be at risk for readmission if discharged home as opposed to therapy recommendation for  SNF.  Education:    Patient left supine with all lines intact, call button in reach, bed alarm on and nurse Ese notified    GOALS:   Multidisciplinary Problems     Occupational Therapy Goals        Problem: Occupational Therapy Goal    Goal Priority Disciplines Outcome Interventions   Occupational Therapy Goal     OT, PT/OT Ongoing, Progressing    Description:  Goals to be met by: 2020     Patient will increase functional independence with ADLs by performing:    Grooming while standing with Supervision.  Toileting from toilet with Contact Guard Assistance for hygiene and clothing management.   Step transfer with Contact Guard Assistance  Toilet transfer to toilet with Contact Guard Assistance.  Upper extremity exercise program per handout, with assistance as needed.                      History:     Past Medical History:   Diagnosis Date    Arthritis     Rheumatoid  arthritis    Cardiac arrhythmia     Coronary artery disease     Diabetes mellitus type II     Pneumonia     as a kid, with empyema    Renal insufficiency     Rheumatoid arthritis(714.0)        Past Surgical History:   Procedure Laterality Date    CARDIAC DEFIBRILLATOR PLACEMENT      CORONARY ARTERY BYPASS GRAFT      FOOT SURGERY       4 para 3       HYSTERECTOMY      osteomyelitis      TONSILLECTOMY      TOTAL KNEE ARTHROPLASTY      TOTAL SHOULDER ARTHROPLASTY         Time Tracking:     OT Date of Treatment: 01/10/20  OT Start Time: 1056  OT Stop Time: 1114  OT Total Time (min): 18 min    Billable Minutes:Evaluation 18 minutes with PT    ROSALES Merino MS  1/10/2020

## 2020-01-10 NOTE — PLAN OF CARE
Problem: Occupational Therapy Goal  Goal: Occupational Therapy Goal  Description  Goals to be met by: 1/17/2020     Patient will increase functional independence with ADLs by performing:    Grooming while standing with Supervision.  Toileting from toilet with Contact Guard Assistance for hygiene and clothing management.   Step transfer with Contact Guard Assistance  Toilet transfer to toilet with Contact Guard Assistance.  Upper extremity exercise program per handout, with assistance as needed.     Outcome: Ongoing, Progressing     Patient tolerated evaluation well, good participation.  Patient will benefit from skilled OT services to address the above mentioned goals. ROSALES Merino, MS

## 2020-01-10 NOTE — NURSING
Bedside report received from SHANTELL Daniels. Patient is awake and alert. Patient appears to be in no apparent distress. Safety maintained. Will continue to monitor.

## 2020-01-10 NOTE — ASSESSMENT & PLAN NOTE
Lab Results   Component Value Date    HGBA1C 7.4 (H) 01/09/2020   obtain A1c.   SSI and adjust accordingly,  On prandial ,basal,and SSI at [facility].

## 2020-01-10 NOTE — ASSESSMENT & PLAN NOTE
Per patient, history of dementia with visual auditory hallucinations x 2 years. Baseline oriented to self and place but not time.   EKG accelerated Junctional 72. Troponin x1  Lactate normal.  UA no infection. CTH no acute abnormality.   Contributing to falls (Dec 4, Jan 3, and yesterday Jaison 8 x 2 ). All the falls have been unwitnessed as patient lives alone.  Currently mental status at baseline  PT/OT consult  Consulted  for NH placement.

## 2020-01-11 PROBLEM — I48.0 PAROXYSMAL ATRIAL FIBRILLATION: Status: ACTIVE | Noted: 2020-01-11

## 2020-01-11 LAB
ANION GAP SERPL CALC-SCNC: 11 MMOL/L (ref 8–16)
BASOPHILS # BLD AUTO: 0 K/UL (ref 0–0.2)
BASOPHILS NFR BLD: 0 % (ref 0–1.9)
BUN SERPL-MCNC: 25 MG/DL (ref 8–23)
CALCIUM SERPL-MCNC: 8.7 MG/DL (ref 8.7–10.5)
CHLORIDE SERPL-SCNC: 101 MMOL/L (ref 95–110)
CO2 SERPL-SCNC: 20 MMOL/L (ref 23–29)
CREAT SERPL-MCNC: 1 MG/DL (ref 0.5–1.4)
DIFFERENTIAL METHOD: ABNORMAL
EOSINOPHIL # BLD AUTO: 0 K/UL (ref 0–0.5)
EOSINOPHIL NFR BLD: 0 % (ref 0–8)
ERYTHROCYTE [DISTWIDTH] IN BLOOD BY AUTOMATED COUNT: 13.4 % (ref 11.5–14.5)
EST. GFR  (AFRICAN AMERICAN): >60 ML/MIN/1.73 M^2
EST. GFR  (NON AFRICAN AMERICAN): 54 ML/MIN/1.73 M^2
GLUCOSE SERPL-MCNC: 343 MG/DL (ref 70–110)
HCT VFR BLD AUTO: 31.1 % (ref 37–48.5)
HGB BLD-MCNC: 10 G/DL (ref 12–16)
IMM GRANULOCYTES # BLD AUTO: 0.03 K/UL (ref 0–0.04)
IMM GRANULOCYTES NFR BLD AUTO: 0.4 % (ref 0–0.5)
LYMPHOCYTES # BLD AUTO: 0.7 K/UL (ref 1–4.8)
LYMPHOCYTES NFR BLD: 8.8 % (ref 18–48)
MCH RBC QN AUTO: 27.7 PG (ref 27–31)
MCHC RBC AUTO-ENTMCNC: 32.2 G/DL (ref 32–36)
MCV RBC AUTO: 86 FL (ref 82–98)
MONOCYTES # BLD AUTO: 0.5 K/UL (ref 0.3–1)
MONOCYTES NFR BLD: 6.3 % (ref 4–15)
NEUTROPHILS # BLD AUTO: 6.4 K/UL (ref 1.8–7.7)
NEUTROPHILS NFR BLD: 84.5 % (ref 38–73)
NRBC BLD-RTO: 0 /100 WBC
PLATELET # BLD AUTO: 394 K/UL (ref 150–350)
PMV BLD AUTO: 10 FL (ref 9.2–12.9)
POCT GLUCOSE: 164 MG/DL (ref 70–110)
POCT GLUCOSE: 200 MG/DL (ref 70–110)
POCT GLUCOSE: 52 MG/DL (ref 70–110)
POTASSIUM SERPL-SCNC: 4 MMOL/L (ref 3.5–5.1)
RBC # BLD AUTO: 3.61 M/UL (ref 4–5.4)
SODIUM SERPL-SCNC: 132 MMOL/L (ref 136–145)
WBC # BLD AUTO: 7.59 K/UL (ref 3.9–12.7)

## 2020-01-11 PROCEDURE — 63600175 PHARM REV CODE 636 W HCPCS: Performed by: HOSPITALIST

## 2020-01-11 PROCEDURE — 25000003 PHARM REV CODE 250: Performed by: INTERNAL MEDICINE

## 2020-01-11 PROCEDURE — 21400001 HC TELEMETRY ROOM

## 2020-01-11 PROCEDURE — 85025 COMPLETE CBC W/AUTO DIFF WBC: CPT

## 2020-01-11 PROCEDURE — 80048 BASIC METABOLIC PNL TOTAL CA: CPT

## 2020-01-11 PROCEDURE — 99223 PR INITIAL HOSPITAL CARE,LEVL III: ICD-10-PCS | Mod: ,,, | Performed by: INTERNAL MEDICINE

## 2020-01-11 PROCEDURE — 63600175 PHARM REV CODE 636 W HCPCS: Performed by: NURSE PRACTITIONER

## 2020-01-11 PROCEDURE — 36415 COLL VENOUS BLD VENIPUNCTURE: CPT

## 2020-01-11 PROCEDURE — 25000003 PHARM REV CODE 250: Performed by: HOSPITALIST

## 2020-01-11 PROCEDURE — 94761 N-INVAS EAR/PLS OXIMETRY MLT: CPT

## 2020-01-11 PROCEDURE — 25000003 PHARM REV CODE 250: Performed by: EMERGENCY MEDICINE

## 2020-01-11 PROCEDURE — S0166 INJ OLANZAPINE 2.5MG: HCPCS | Performed by: INTERNAL MEDICINE

## 2020-01-11 PROCEDURE — 99223 1ST HOSP IP/OBS HIGH 75: CPT | Mod: ,,, | Performed by: INTERNAL MEDICINE

## 2020-01-11 PROCEDURE — 25000003 PHARM REV CODE 250: Performed by: NURSE PRACTITIONER

## 2020-01-11 PROCEDURE — C9399 UNCLASSIFIED DRUGS OR BIOLOG: HCPCS | Performed by: HOSPITALIST

## 2020-01-11 RX ORDER — INSULIN ASPART 100 [IU]/ML
12 INJECTION, SOLUTION INTRAVENOUS; SUBCUTANEOUS
Status: DISCONTINUED | OUTPATIENT
Start: 2020-01-11 | End: 2020-01-20 | Stop reason: HOSPADM

## 2020-01-11 RX ORDER — OLANZAPINE 5 MG/1
5 TABLET, ORALLY DISINTEGRATING ORAL EVERY 6 HOURS PRN
Status: DISCONTINUED | OUTPATIENT
Start: 2020-01-11 | End: 2020-01-20 | Stop reason: HOSPADM

## 2020-01-11 RX ORDER — OLANZAPINE 10 MG/2ML
10 INJECTION, POWDER, FOR SOLUTION INTRAMUSCULAR ONCE
Status: COMPLETED | OUTPATIENT
Start: 2020-01-11 | End: 2020-01-11

## 2020-01-11 RX ORDER — LOSARTAN POTASSIUM 25 MG/1
25 TABLET ORAL DAILY
Status: DISCONTINUED | OUTPATIENT
Start: 2020-01-11 | End: 2020-01-20 | Stop reason: HOSPADM

## 2020-01-11 RX ADMIN — CARVEDILOL 6.25 MG: 6.25 TABLET, FILM COATED ORAL at 08:01

## 2020-01-11 RX ADMIN — LOSARTAN POTASSIUM 25 MG: 25 TABLET ORAL at 01:01

## 2020-01-11 RX ADMIN — OLANZAPINE 10 MG: 10 INJECTION, POWDER, FOR SOLUTION INTRAMUSCULAR at 07:01

## 2020-01-11 RX ADMIN — TRIAMCINOLONE ACETONIDE: 1 CREAM TOPICAL at 08:01

## 2020-01-11 RX ADMIN — INSULIN DETEMIR 25 UNITS: 100 INJECTION, SOLUTION SUBCUTANEOUS at 08:01

## 2020-01-11 RX ADMIN — PANTOPRAZOLE SODIUM 40 MG: 40 TABLET, DELAYED RELEASE ORAL at 08:01

## 2020-01-11 RX ADMIN — OLANZAPINE 5 MG: 5 TABLET, ORALLY DISINTEGRATING ORAL at 06:01

## 2020-01-11 RX ADMIN — PRAVASTATIN SODIUM 40 MG: 40 TABLET ORAL at 08:01

## 2020-01-11 RX ADMIN — INSULIN ASPART 5 UNITS: 100 INJECTION, SOLUTION INTRAVENOUS; SUBCUTANEOUS at 08:01

## 2020-01-11 RX ADMIN — SENNOSIDES AND DOCUSATE SODIUM 1 TABLET: 8.6; 5 TABLET ORAL at 08:01

## 2020-01-11 RX ADMIN — METHYLPREDNISOLONE SODIUM SUCCINATE 40 MG: 40 INJECTION, POWDER, FOR SOLUTION INTRAMUSCULAR; INTRAVENOUS at 08:01

## 2020-01-11 RX ADMIN — CLOPIDOGREL BISULFATE 75 MG: 75 TABLET ORAL at 08:01

## 2020-01-11 RX ADMIN — METHYLPREDNISOLONE SODIUM SUCCINATE 30 MG: 40 INJECTION, POWDER, FOR SOLUTION INTRAMUSCULAR; INTRAVENOUS at 08:01

## 2020-01-11 RX ADMIN — ASPIRIN 325 MG: 325 TABLET, DELAYED RELEASE ORAL at 08:01

## 2020-01-11 RX ADMIN — FUROSEMIDE 40 MG: 40 TABLET ORAL at 08:01

## 2020-01-11 RX ADMIN — INSULIN ASPART 8 UNITS: 100 INJECTION, SOLUTION INTRAVENOUS; SUBCUTANEOUS at 08:01

## 2020-01-11 RX ADMIN — INSULIN ASPART 12 UNITS: 100 INJECTION, SOLUTION INTRAVENOUS; SUBCUTANEOUS at 01:01

## 2020-01-11 NOTE — CONSULTS
[facility]  Cardiology  Consult Note    Patient Name: Ruthann Doe  MRN: [id number]  Admission Date: 1/8/2020  Hospital Length of Stay: 2 days  Code Status: DNR   Attending Provider: Sammy Perales MD   Consulting Provider: Osmar Pastor MD  Primary Care Physician: Jesse Partida MD  Principal Problem:Pemphigus vulgaris    Patient information was obtained from patient and ER records.     Inpatient consult to Cardiology  Consult performed by: Osmar Pastor MD  Consult ordered by: Michel Woody MD        Subjective:     Chief Complaint:  afib     HPI:   HPI:  Mrs. Doe is a 80 yo female with significant hypertension, hyperlipidemia, diabetes type 2, dementia, PPM/AICD?, CAD status post MI, chronic systolic diastolic heart failure, and pulmonary hypertension who was brought by Daughter Ayse to hospital for worsen dementia, recurrent falls and need for NHP. Patient lives alone. Daughter Ayse have been estranged from patient for over 10 years but stepped in few years ago to assist. Daughter Ayse reports patient dementia with visual /aduitory hallucinations for over 2 years and consisting of dead people and cats for 2 years. Baseline ambulates with walker up until 10 days ago.    Patient also found to have diffuse body lesions consisting for blisters and scab over lesion through out body sparring the buttock and perineum. Patient had similar lesions in Sept however only on torso which cleared up with steroid injections in Sept and October. By end of November, lesions completely resolved. 10 days ago patient was brought to Urgent Care on [address] [facility] then told had cellulitis prescribed clindamycin 300 mg TID.     CXR no acute process. CTH  No acute intracranial abnormality. Afebrile and no leukocytosis. UA no evidence of infection.      Overview/Hospital Course:  Mrs. Doe is a 80 yo female with significant hypertension, hyperlipidemia, diabetes type 2, dementia, PPM/AICD?,  CAD status post MI, chronic systolic diastolic heart failure, and pulmonary hypertension who was brought by Daughter Ayse to hospital for worsen dementia, recurrent falls and need for NHP. Patient lives alone. Daughter Ayse have been estranged from patient for over 10 years but stepped in few years ago to assist. Daughter Ayse reports patient dementia with visual /aduitory hallucinations for over 2 years and consisting of dead people and cats for 2 years. Baseline ambulates with walker up until 10 days ago.    Patient also found to have diffuse body lesions consisting for blisters and scab over lesion through out body sparring the buttock and perineum. Patient had similar lesions in Sept however only on torso which cleared up with steroid injections in Sept and October. By end of November, lesions completely resolved. 10 days ago patient was brought to Urgent Care on [address] then told had cellulitis prescribed clindamycin 300 mg TID.     CXR no acute process. CTH  No acute intracranial abnormality. Afebrile and no leukocytosis. UA no evidence of infection.      Cardiology consultation has been obtained to help manage atrial fibrillation.  I reviewed all her EKGs and there up past EKGs which demonstrate atrial fibrillation also.  Patient currently is agitated unrestrained in bed.  However able to give history.  Denies any chest pains at rest on exertion, orthopnea, PND, swelling of feet.  Not aware of her history of her atrial fibrillation.  Reviewed the tele monitoring and she is found to be in atrial fibrillation with rate control.  She has not been on anticoagulation in the past.     And her other cardiac history significant for severe cardiomyopathy and coronary artery disease.    Past Medical History:   Diagnosis Date    Arthritis     Rheumatoid  arthritis    Cardiac arrhythmia     Coronary artery disease     Diabetes mellitus type II     Pneumonia     as a kid, with empyema    Renal insufficiency      Rheumatoid arthritis(714.0)        Past Surgical History:   Procedure Laterality Date    CARDIAC DEFIBRILLATOR PLACEMENT      CORONARY ARTERY BYPASS GRAFT      FOOT SURGERY       4 para 3       HYSTERECTOMY      osteomyelitis      TONSILLECTOMY      TOTAL KNEE ARTHROPLASTY      TOTAL SHOULDER ARTHROPLASTY         Review of patient's allergies indicates:   Allergen Reactions    Sulfa (sulfonamide antibiotics) Hives       No current facility-administered medications on file prior to encounter.      Current Outpatient Medications on File Prior to Encounter   Medication Sig    aspirin (ECOTRIN) 325 MG EC tablet Take 325 mg by mouth once daily.      clopidogrel (PLAVIX) 75 mg tablet Take 1 tablet (75 mg total) by mouth once daily.    glimepiride (AMARYL) 1 MG tablet     linaGLIPtin (TRADJENTA) 5 mg Tab tablet Take 5 mg by mouth once daily.    metFORMIN (GLUCOPHAGE) 1000 MG tablet Take 1,000 mg by mouth 2 (two) times daily with meals.    metoprolol succinate (TOPROL-XL) 25 MG 24 hr tablet TAKE 1 TABLET BY MOUTH TWICE DAILY    multivitamin capsule Take 1 capsule by mouth once daily.      pravastatin (PRAVACHOL) 40 MG tablet     predniSONE (DELTASONE) 5 MG tablet Take 5 mg by mouth once daily.    lidocaine HCl 2% (LIDOCAINE VISCOUS) 2 % Soln 15ml every 3h as needed for oral pain    vitamin D 185 MG Tab Take 1,000 mg by mouth once daily.       Family History     Problem Relation (Age of Onset)    Cancer Maternal Uncle    Diabetes Mother    Heart disease Mother        Tobacco Use    Smoking status: Never Smoker    Smokeless tobacco: Never Used    Tobacco comment: Lives with son, extended family.  Three children.   Substance and Sexual Activity    Alcohol use: Not Currently     Comment: only during holidays    Drug use: No    Sexual activity: Never     Review of Systems   Constitution: Negative.   HENT: Negative.    Eyes: Negative.    Cardiovascular: Negative.  Negative for chest pain, dyspnea  on exertion and leg swelling.   Respiratory: Negative.    Endocrine: Negative.    Hematologic/Lymphatic: Negative.    Skin: Positive for itching and rash.   Musculoskeletal: Negative.    Gastrointestinal: Negative.    Genitourinary: Negative.    Neurological: Negative.    Psychiatric/Behavioral: Positive for altered mental status. The patient is nervous/anxious.    Allergic/Immunologic: Negative.      Objective:     Vital Signs (Most Recent):  Temp: 98.1 °F (36.7 °C) (01/11/20 0800)  Pulse: 106 (01/11/20 0800)  Resp: 18 (01/11/20 0800)  BP: (!) 150/81 (01/11/20 0800)  SpO2: (!) 94 % (01/11/20 0800) Vital Signs (24h Range):  Temp:  [97.6 °F (36.4 °C)-98.9 °F (37.2 °C)] 98.1 °F (36.7 °C)  Pulse:  [] 106  Resp:  [16-20] 18  SpO2:  [93 %-100 %] 94 %  BP: ()/(51-81) 150/81     Weight: 54 kg (119 lb 0.8 oz)  Body mass index is 20.43 kg/m².    SpO2: (!) 94 %  O2 Device (Oxygen Therapy): room air      Intake/Output Summary (Last 24 hours) at 1/11/2020 1108  Last data filed at 1/11/2020 0100  Gross per 24 hour   Intake 360 ml   Output --   Net 360 ml       Lines/Drains/Airways     Peripheral Intravenous Line                 Midline Catheter Insertion/Assessment  - Single Lumen 01/10/20 1245 Right basilic vein (medial side of arm) other (see comments) less than 1 day                Physical Exam   Constitutional: She is oriented to person, place, and time. She appears well-developed and well-nourished.   HENT:   Head: Normocephalic.   Eyes: Pupils are equal, round, and reactive to light.   Neck: Normal range of motion. Neck supple.   Cardiovascular: Normal rate. An irregularly irregular rhythm present.   Pulmonary/Chest: Effort normal and breath sounds normal.   Abdominal: Soft. Normal appearance and bowel sounds are normal. There is no tenderness.   Musculoskeletal: Normal range of motion.   Neurological: She is alert and oriented to person, place, and time.   Skin: Skin is warm. Lesion noted.   Psychiatric: She  has a normal mood and affect.       Significant Labs:   BMP:   Recent Labs   Lab 01/10/20  0720 01/11/20  0521   *  514* 343*   *  133* 132*   K 4.0  4.0 4.0     101 101   CO2 22*  22* 20*   BUN 15  15 25*   CREATININE 1.2  1.2 1.0   CALCIUM 8.7  8.7 8.7   , CMP   Recent Labs   Lab 01/10/20  0720 01/11/20  0521   *  133* 132*   K 4.0  4.0 4.0     101 101   CO2 22*  22* 20*   *  514* 343*   BUN 15  15 25*   CREATININE 1.2  1.2 1.0   CALCIUM 8.7  8.7 8.7   PROT 6.3  --    ALBUMIN 2.9*  --    BILITOT 0.4  --    ALKPHOS 57  --    AST 13  --    ALT 13  --    ANIONGAP 10  10 11   ESTGFRAFRICA 50*  50* >60   EGFRNONAA 43*  43* 54*   , CBC   Recent Labs   Lab 01/09/20  1258 01/10/20  0720 01/11/20  0521   WBC 6.92 6.14  6.14 7.59   HGB 10.4* 9.4*  9.4* 10.0*   HCT 32.3* 28.9*  28.9* 31.1*    354*  354* 394*   , INR No results for input(s): INR, PROTIME in the last 48 hours., Lipid Panel No results for input(s): CHOL, HDL, LDLCALC, TRIG, CHOLHDL in the last 48 hours., Troponin No results for input(s): TROPONINI in the last 48 hours. and All pertinent lab results from the last 24 hours have been reviewed.    Significant Imaging: Echocardiogram: Transthoracic echo (TTE) complete (Cupid Only): No results found for this or any previous visit.    Assessment and Plan:     Paroxysmal atrial fibrillation  Currently in AFib.  I saw AFib on past EKGs in 2018 also.  Asymptomatic from the AFib.  Had a detailed discussion with the primary team.  Patient has frequent falls, history of medication noncompliance as well as dementia with behavioral disturbances.  This puts at her at high risk for anticoagulation, especially because of frequent falls.    Chronic combined systolic and diastolic heart failure  Patient with known systolic dysfunction.  Last EF of 25-30%.  Currently euvolemic.  Recommend Coreg and ARB  Check echo    Recurrent falls        Noncompliance with  medication regimen        Myocardial infarction, old        CAD (coronary artery disease)  Stress test in 2018 did not demonstrate any ischemia.  Showed fixed defect.  Denies any chest pains.  No further ischemic workup is indicated during this hospitalization.        VTE Risk Mitigation (From admission, onward)         Ordered     IP VTE HIGH RISK PATIENT  Once      01/09/20 0815     Place CHANO hose  Until discontinued      01/09/20 0815     Place sequential compression device  Until discontinued      01/09/20 0815                Thank you for your consult. I will follow-up with patient. Please contact us if you have any additional questions.    Osmar Pastor MD  Cardiology   [facility]

## 2020-01-11 NOTE — SUBJECTIVE & OBJECTIVE
Interval History: patient has no complaints other than she would like to get out of restraints.       Review of Systems   Constitutional: Negative for activity change.   HENT: Negative for congestion.    Respiratory: Negative for chest tightness and shortness of breath.    Cardiovascular: Negative for chest pain.   Genitourinary: Negative for difficulty urinating.   Musculoskeletal: Negative for arthralgias.   Psychiatric/Behavioral: Negative for agitation.     Objective:     Vital Signs (Most Recent):  Temp: 98.1 °F (36.7 °C) (01/11/20 0800)  Pulse: 106 (01/11/20 0800)  Resp: 18 (01/11/20 0800)  BP: (!) 150/81 (01/11/20 0800)  SpO2: (!) 94 % (01/11/20 0800) Vital Signs (24h Range):  Temp:  [97.6 °F (36.4 °C)-98.9 °F (37.2 °C)] 98.1 °F (36.7 °C)  Pulse:  [] 106  Resp:  [16-20] 18  SpO2:  [93 %-100 %] 94 %  BP: ()/(51-81) 150/81     Weight: 54 kg (119 lb 0.8 oz)  Body mass index is 20.43 kg/m².    Intake/Output Summary (Last 24 hours) at 1/11/2020 1103  Last data filed at 1/11/2020 0100  Gross per 24 hour   Intake 360 ml   Output --   Net 360 ml      Physical Exam   Constitutional: She is oriented to person, place, and time. She appears well-developed and well-nourished.   HENT:   Head: Normocephalic and atraumatic.   Neurological: She is alert and oriented to person, place, and time.   Skin: Skin is warm and dry.   Psychiatric: She has a normal mood and affect. Her behavior is normal.   Nursing note and vitals reviewed.      Significant Labs:   BMP:   Recent Labs   Lab 01/11/20  0521   *   *   K 4.0      CO2 20*   BUN 25*   CREATININE 1.0   CALCIUM 8.7     CBC:   Recent Labs   Lab 01/09/20  1258 01/10/20  0720 01/11/20  0521   WBC 6.92 6.14  6.14 7.59   HGB 10.4* 9.4*  9.4* 10.0*   HCT 32.3* 28.9*  28.9* 31.1*    354*  354* 394*       Significant Imaging

## 2020-01-11 NOTE — ASSESSMENT & PLAN NOTE
Patient with known systolic dysfunction.  Last EF of 25-30%.  Currently euvolemic.  Recommend Coreg and ARB  Check echo

## 2020-01-11 NOTE — NURSING
End of shift bedside report given to SHANTELL Daniels. Patient in no apparent distress.     12 hour chart check complete.

## 2020-01-11 NOTE — ASSESSMENT & PLAN NOTE
Lab Results   Component Value Date    HGBA1C 7.4 (H) 01/09/2020   obtain A1c.   SSI and adjust accordingly,  On prandial ,basal,and SSI at [facility].    Increasing insulin for solumedrol

## 2020-01-11 NOTE — ASSESSMENT & PLAN NOTE
Stress test in 2018 did not demonstrate any ischemia.  Showed fixed defect.  Denies any chest pains.  No further ischemic workup is indicated during this hospitalization.

## 2020-01-11 NOTE — HPI
HPI:  Mrs. Doe is a 78 yo female with significant hypertension, hyperlipidemia, diabetes type 2, dementia, PPM/AICD?, CAD status post MI, chronic systolic diastolic heart failure, and pulmonary hypertension who was brought by Daughter Ayse to hospital for worsen dementia, recurrent falls and need for NHP. Patient lives alone. Daughter Ayse have been estranged from patient for over 10 years but stepped in few years ago to assist. Daughter Ayse reports patient dementia with visual /aduitory hallucinations for over 2 years and consisting of dead people and cats for 2 years. Baseline ambulates with walker up until 10 days ago.    Patient also found to have diffuse body lesions consisting for blisters and scab over lesion through out body sparring the buttock and perineum. Patient had similar lesions in Sept however only on torso which cleared up with steroid injections in Sept and October. By end of November, lesions completely resolved. 10 days ago patient was brought to Urgent Care on [facility] then told had cellulitis prescribed clindamycin 300 mg TID.     CXR no acute process. CTH  No acute intracranial abnormality. Afebrile and no leukocytosis. UA no evidence of infection.      Overview/Hospital Course:  Mrs. Doe is a 78 yo female with significant hypertension, hyperlipidemia, diabetes type 2, dementia, PPM/AICD?, CAD status post MI, chronic systolic diastolic heart failure, and pulmonary hypertension who was brought by Daughter Ayse to hospital for worsen dementia, recurrent falls and need for NHP. Patient lives alone. Daughter Ayse have been estranged from patient for over 10 years but stepped in few years ago to assist. Daughter Ayse reports patient dementia with visual /aduitory hallucinations for over 2 years and consisting of dead people and cats for 2 years. Baseline ambulates with walker up until 10 days ago.    Patient also found to have diffuse body lesions consisting for blisters and scab over lesion  through out body sparring the buttock and perineum. Patient had similar lesions in Sept however only on torso which cleared up with steroid injections in Sept and October. By end of November, lesions completely resolved. 10 days ago patient was brought to Urgent Care on [address] then told had cellulitis prescribed clindamycin 300 mg TID.     CXR no acute process. CTH  No acute intracranial abnormality. Afebrile and no leukocytosis. UA no evidence of infection.      Cardiology consultation has been obtained to help manage atrial fibrillation.  I reviewed all her EKGs and there up past EKGs which demonstrate atrial fibrillation also.  Patient currently is agitated unrestrained in bed.  However able to give history.  Denies any chest pains at rest on exertion, orthopnea, PND, swelling of feet.  Not aware of her history of her atrial fibrillation.  Reviewed the tele monitoring and she is found to be in atrial fibrillation with rate control.  She has not been on anticoagulation in the past.     And her other cardiac history significant for severe cardiomyopathy and coronary artery disease.

## 2020-01-11 NOTE — NURSING
Patient pulled out IV. (Patient is a hard stick so ultrasound was used to place that IV).    Dr. Braden notified and ordered for a midline to be placed as patient needs 5 days of IV steriods for skin condition.

## 2020-01-11 NOTE — SUBJECTIVE & OBJECTIVE
Past Medical History:   Diagnosis Date    Arthritis     Rheumatoid  arthritis    Cardiac arrhythmia     Coronary artery disease     Diabetes mellitus type II     Pneumonia     as a kid, with empyema    Renal insufficiency     Rheumatoid arthritis(714.0)        Past Surgical History:   Procedure Laterality Date    CARDIAC DEFIBRILLATOR PLACEMENT      CORONARY ARTERY BYPASS GRAFT      FOOT SURGERY       4 para 3       HYSTERECTOMY      osteomyelitis      TONSILLECTOMY      TOTAL KNEE ARTHROPLASTY      TOTAL SHOULDER ARTHROPLASTY         Review of patient's allergies indicates:   Allergen Reactions    Sulfa (sulfonamide antibiotics) Hives       No current facility-administered medications on file prior to encounter.      Current Outpatient Medications on File Prior to Encounter   Medication Sig    aspirin (ECOTRIN) 325 MG EC tablet Take 325 mg by mouth once daily.      clopidogrel (PLAVIX) 75 mg tablet Take 1 tablet (75 mg total) by mouth once daily.    glimepiride (AMARYL) 1 MG tablet     linaGLIPtin (TRADJENTA) 5 mg Tab tablet Take 5 mg by mouth once daily.    metFORMIN (GLUCOPHAGE) 1000 MG tablet Take 1,000 mg by mouth 2 (two) times daily with meals.    metoprolol succinate (TOPROL-XL) 25 MG 24 hr tablet TAKE 1 TABLET BY MOUTH TWICE DAILY    multivitamin capsule Take 1 capsule by mouth once daily.      pravastatin (PRAVACHOL) 40 MG tablet     predniSONE (DELTASONE) 5 MG tablet Take 5 mg by mouth once daily.    lidocaine HCl 2% (LIDOCAINE VISCOUS) 2 % Soln 15ml every 3h as needed for oral pain    vitamin D 185 MG Tab Take 1,000 mg by mouth once daily.       Family History     Problem Relation (Age of Onset)    Cancer Maternal Uncle    Diabetes Mother    Heart disease Mother        Tobacco Use    Smoking status: Never Smoker    Smokeless tobacco: Never Used    Tobacco comment: Lives with son, extended family.  Three children.   Substance and Sexual Activity    Alcohol use: Not  Currently     Comment: only during holidays    Drug use: No    Sexual activity: Never     Review of Systems   Constitution: Negative.   HENT: Negative.    Eyes: Negative.    Cardiovascular: Negative.  Negative for chest pain, dyspnea on exertion and leg swelling.   Respiratory: Negative.    Endocrine: Negative.    Hematologic/Lymphatic: Negative.    Skin: Positive for itching and rash.   Musculoskeletal: Negative.    Gastrointestinal: Negative.    Genitourinary: Negative.    Neurological: Negative.    Psychiatric/Behavioral: Positive for altered mental status. The patient is nervous/anxious.    Allergic/Immunologic: Negative.      Objective:     Vital Signs (Most Recent):  Temp: 98.1 °F (36.7 °C) (01/11/20 0800)  Pulse: 106 (01/11/20 0800)  Resp: 18 (01/11/20 0800)  BP: (!) 150/81 (01/11/20 0800)  SpO2: (!) 94 % (01/11/20 0800) Vital Signs (24h Range):  Temp:  [97.6 °F (36.4 °C)-98.9 °F (37.2 °C)] 98.1 °F (36.7 °C)  Pulse:  [] 106  Resp:  [16-20] 18  SpO2:  [93 %-100 %] 94 %  BP: ()/(51-81) 150/81     Weight: 54 kg (119 lb 0.8 oz)  Body mass index is 20.43 kg/m².    SpO2: (!) 94 %  O2 Device (Oxygen Therapy): room air      Intake/Output Summary (Last 24 hours) at 1/11/2020 1108  Last data filed at 1/11/2020 0100  Gross per 24 hour   Intake 360 ml   Output --   Net 360 ml       Lines/Drains/Airways     Peripheral Intravenous Line                 Midline Catheter Insertion/Assessment  - Single Lumen 01/10/20 1245 Right basilic vein (medial side of arm) other (see comments) less than 1 day                Physical Exam   Constitutional: She is oriented to person, place, and time. She appears well-developed and well-nourished.   HENT:   Head: Normocephalic.   Eyes: Pupils are equal, round, and reactive to light.   Neck: Normal range of motion. Neck supple.   Cardiovascular: Normal rate. An irregularly irregular rhythm present.   Pulmonary/Chest: Effort normal and breath sounds normal.   Abdominal: Soft.  Normal appearance and bowel sounds are normal. There is no tenderness.   Musculoskeletal: Normal range of motion.   Neurological: She is alert and oriented to person, place, and time.   Skin: Skin is warm. Lesion noted.   Psychiatric: She has a normal mood and affect.       Significant Labs:   BMP:   Recent Labs   Lab 01/10/20  0720 01/11/20  0521   *  514* 343*   *  133* 132*   K 4.0  4.0 4.0     101 101   CO2 22*  22* 20*   BUN 15  15 25*   CREATININE 1.2  1.2 1.0   CALCIUM 8.7  8.7 8.7   , CMP   Recent Labs   Lab 01/10/20  0720 01/11/20  0521   *  133* 132*   K 4.0  4.0 4.0     101 101   CO2 22*  22* 20*   *  514* 343*   BUN 15  15 25*   CREATININE 1.2  1.2 1.0   CALCIUM 8.7  8.7 8.7   PROT 6.3  --    ALBUMIN 2.9*  --    BILITOT 0.4  --    ALKPHOS 57  --    AST 13  --    ALT 13  --    ANIONGAP 10  10 11   ESTGFRAFRICA 50*  50* >60   EGFRNONAA 43*  43* 54*   , CBC   Recent Labs   Lab 01/09/20  1258 01/10/20  0720 01/11/20  0521   WBC 6.92 6.14  6.14 7.59   HGB 10.4* 9.4*  9.4* 10.0*   HCT 32.3* 28.9*  28.9* 31.1*    354*  354* 394*   , INR No results for input(s): INR, PROTIME in the last 48 hours., Lipid Panel No results for input(s): CHOL, HDL, LDLCALC, TRIG, CHOLHDL in the last 48 hours., Troponin No results for input(s): TROPONINI in the last 48 hours. and All pertinent lab results from the last 24 hours have been reviewed.    Significant Imaging: Echocardiogram: Transthoracic echo (TTE) complete (Cupid Only): No results found for this or any previous visit.

## 2020-01-11 NOTE — ASSESSMENT & PLAN NOTE
Currently in AFib.  I saw AFib on past EKGs in 2018 also.  Asymptomatic from the AFib.  Had a detailed discussion with the primary team.  Patient has frequent falls, history of medication noncompliance as well as dementia with behavioral disturbances.  This puts at her at high risk for anticoagulation, especially because of frequent falls.

## 2020-01-11 NOTE — NURSING
"Called to patient's room by staff. Found patient standing on side of bed trying to hit staff. Angry, aggressive frowning, patient stated, "I'm going to hit you, b**ch" and tried to bite staff. Oriented to self only.    Patient does not recognize surroundings, trying to leave to go to her doctor's appointment in [place]. When told today was Saturday, she said, "Oh, today is not my appointment." Continues to look for her clothes, pushing past staff.     's to 140's afib.     Staff assisted to walk around the unit with walker.    0610 Currently sitting up in bed eating cereal.  "

## 2020-01-11 NOTE — PROGRESS NOTES
[facility] Hospital Medicine  Progress Note    Patient Name: Ruthann Doe  MRN: [id number]  Patient Class: IP- Inpatient   Admission Date: 1/8/2020  Length of Stay: 2 days  Attending Physician: Sammy Perales MD  Primary Care Provider: Jesse Partida MD        Subjective:     Principal Problem:Pemphigus vulgaris        HPI:  Mrs. Doe is a 80 yo female with significant hypertension, hyperlipidemia, diabetes type 2, dementia, PPM/AICD?, CAD status post MI, chronic systolic diastolic heart failure, and pulmonary hypertension who was brought by Daughter Ayse to hospital for worsen dementia, recurrent falls and need for NHP. Patient lives alone. Daughter Ayse have been estranged from patient for over 10 years but stepped in few years ago to assist. Daughter Ayse reports patient dementia with visual /aduitory hallucinations for over 2 years and consisting of dead people and cats for 2 years. Baseline ambulates with walker up until 10 days ago.    Patient also found to have diffuse body lesions consisting for blisters and scab over lesion through out body sparring the buttock and perineum. Patient had similar lesions in Sept however only on torso which cleared up with steroid injections in Sept and October. By end of November, lesions completely resolved. 10 days ago patient was brought to Urgent Care on [address] then told had cellulitis prescribed clindamycin 300 mg TID.    CXR no acute process. CTH  No acute intracranial abnormality. Afebrile and no leukocytosis. UA no evidence of infection.     Overview/Hospital Course:  Mrs. Doe is a 80 yo female with significant hypertension, hyperlipidemia, diabetes type 2, dementia, PPM/AICD?, CAD status post MI, chronic systolic diastolic heart failure, and pulmonary hypertension who was brought by Daughter Ayse to hospital for worsen dementia, recurrent falls and need for NHP. Patient lives alone. Daughter Ayse have been estranged from  patient for over 10 years but stepped in few years ago to assist. Daughter Ayse reports patient dementia with visual /aduitory hallucinations for over 2 years and consisting of dead people and cats for 2 years. Baseline ambulates with walker up until 10 days ago.    Patient also found to have diffuse body lesions consisting for blisters and scab over lesion through out body sparring the buttock and perineum. Patient had similar lesions in Sept however only on torso which cleared up with steroid injections in Sept and October. By end of November, lesions completely resolved. 10 days ago patient was brought to Urgent Care [facility] then told had cellulitis prescribed clindamycin 300 mg TID.    CXR no acute process. CTH  No acute intracranial abnormality. Afebrile and no leukocytosis. UA no evidence of infection.  PT/OT evaluated the patient and recommended SNF         Interval History: patient has no complaints other than she would like to get out of restraints.       Review of Systems   Constitutional: Negative for activity change.   HENT: Negative for congestion.    Respiratory: Negative for chest tightness and shortness of breath.    Cardiovascular: Negative for chest pain.   Genitourinary: Negative for difficulty urinating.   Musculoskeletal: Negative for arthralgias.   Psychiatric/Behavioral: Negative for agitation.     Objective:     Vital Signs (Most Recent):  Temp: 98.1 °F (36.7 °C) (01/11/20 0800)  Pulse: 106 (01/11/20 0800)  Resp: 18 (01/11/20 0800)  BP: (!) 150/81 (01/11/20 0800)  SpO2: (!) 94 % (01/11/20 0800) Vital Signs (24h Range):  Temp:  [97.6 °F (36.4 °C)-98.9 °F (37.2 °C)] 98.1 °F (36.7 °C)  Pulse:  [] 106  Resp:  [16-20] 18  SpO2:  [93 %-100 %] 94 %  BP: ()/(51-81) 150/81     Weight: 54 kg (119 lb 0.8 oz)  Body mass index is 20.43 kg/m².    Intake/Output Summary (Last 24 hours) at 1/11/2020 1103  Last data filed at 1/11/2020 0100  Gross per 24 hour   Intake 360 ml   Output --   Net  "360 ml      Physical Exam   Constitutional: She is oriented to person, place, and time. She appears well-developed and well-nourished.   HENT:   Head: Normocephalic and atraumatic.   Neurological: She is alert and oriented to person, place, and time.   Skin: Skin is warm and dry.   Psychiatric: She has a normal mood and affect. Her behavior is normal.   Nursing note and vitals reviewed.      Significant Labs:   BMP:   Recent Labs   Lab 01/11/20  0521   *   *   K 4.0      CO2 20*   BUN 25*   CREATININE 1.0   CALCIUM 8.7     CBC:   Recent Labs   Lab 01/09/20  1258 01/10/20  0720 01/11/20  0521   WBC 6.92 6.14  6.14 7.59   HGB 10.4* 9.4*  9.4* 10.0*   HCT 32.3* 28.9*  28.9* 31.1*    354*  354* 394*       Significant Imaging      Assessment/Plan:      * Pemphigus vulgaris    See physical exam. Patient was seen by Dermatology Dr. Hinton in Sept for similar lesion but not this extensive. Lesion cleared with steroid injections? X 2. Daughter Ayse reports biopsy showed "Dermatitis of unknown origin."   Denies any new antibiotics up untial 12/29 placed on clindamycin for "cellulits" when seen by [facility] [address].   Will hold clindamycin  Consult Dermatology -Called office [phone] at approx 1030 and again 1100 however provider will not available until 1 pm.  Consult wound care to assist.       Dermatology recommended start IV and topical steroids for Pemphigus Vulgaris.   IV solumedrol 30 mg IV BID x 5 days  Triamcinolone 0.1 % cream BID   For punch biopsy today  If no improvement in 5 days, may need to start IVIG.         Chronic combined systolic and diastolic heart failure  EF of 25-30%,Euvolemic. Not on diuretics at home.  Continue metoprolol. Resume ACEI/ARB if renal function stable and BP tolerates.       Pressure injury of left heel, unstageable  Apply boot. Wound care consult       Recurrent falls  See above #1.     PT/OT rec: SNF.   Likely not a candidate for anti-coagulation due to " "falls.        Diffuse Skin Breakdown and Lesions  See above      CKD stage 2 due to type 2 diabetes mellitus  Stable. Repeat labs pending.       Anemia, chronic disease  Obtain iron studies, vitamin B 12, folate level      Dementia without behavioral disturbance  Per patient, history of dementia with visual auditory hallucinations x 2 years. Baseline oriented to self and place but not time.   EKG accelerated Junctional 72. Troponin x1  Lactate normal.  UA no infection. CTH no acute abnormality.   Contributing to falls (Dec 4, Jan 3, and yesterday Jaison 8 x 2 ). All the falls have been unwitnessed as patient lives alone.  Currently mental status at baseline  PT/OT consult  Consulted  for NH placement.      Type 2 diabetes mellitus with hyperglycemia  Lab Results   Component Value Date    HGBA1C 7.4 (H) 01/09/2020   obtain A1c.   SSI and adjust accordingly,  On prandial ,basal,and SSI at [facility] stim.    Increasing insulin for solumedrol        AICD (automatic cardioverter/defibrillator) present  No current issues.       Essential hypertension  Continue metoprolol. Resume ramipril ? Hydralazine prn.       CAD (coronary artery disease)  Daughter Ayse reports history of MI/stents/AICD/PPM but not sure dates and previous test.   Denies chest pain or shortness of breath  Continue ASA, plavix, metoprolol. Previously on ramipril.        VTE Risk Mitigation (From admission, onward)         Ordered     IP VTE HIGH RISK PATIENT  Once      01/09/20 0815     Place CHANO hose  Until discontinued      01/09/20 0815     Place sequential compression device  Until discontinued      01/09/20 0815              To SNF when arranged.  Will try out of restraints.     Apparently Cards was consulted for "a-fib" but I have no other details. Not a anti-coag candidate due to falls. Will follow cards recs.           Sammy Rodrigues MD  Department of Hospital Medicine   [facility] [place]    "

## 2020-01-11 NOTE — NURSING
"Note that at shift-change, patient, disoriented and agitated,  up out of bed yelling,"I have to leave this place, my daddy is calling me."    [employer] at bedside;  Patient refusing verbal redirection;  Given oral and IM olanzpine however,continued to get out of bed and became verbally and physically threatening to staff;   Hospital police present however,patient continued resist redirection;    I called daughter,Ayse ([phone]) to inform her of patient's behavior and to determine if she could calm patient; Patient unable to calm herself;    Order for two-point soft wrist restraints written at 0738;     Son-in-law, Puma now at bedside meeting with Charge SHANTELL Bucio;    Patient attempting to get out of restraints; I fed breakfast to her;   Meds given and tolerated well;     Will continue to f/u;          "

## 2020-01-11 NOTE — ASSESSMENT & PLAN NOTE
"  See physical exam. Patient was seen by Dermatology Dr. Hinton in Sept for similar lesion but not this extensive. Lesion cleared with steroid injections? X 2. Daughter Ayse reports biopsy showed "Dermatitis of unknown origin."   Denies any new antibiotics up untial 12/29 placed on clindamycin for "cellulits" when seen by [facility] [address].   Will hold clindamycin  Consult Dermatology -Called office [phone] at approx 1030 and again 1100 however provider will not available until 1 pm.  Consult wound care to assist.       Dermatology recommended start IV and topical steroids for Pemphigus Vulgaris.   IV solumedrol 30 mg IV BID x 5 days  Triamcinolone 0.1 % cream BID   For punch biopsy today  If no improvement in 5 days, may need to start IVIG.       "

## 2020-01-11 NOTE — NURSING
5:45 PM Call received from rimidi patient went in to A-fib ranging from 110-130's. Dr. Braden notified that patient was asymptomatic and No PRN meds were available. Dr. Braden ordered to give patient lopressor 5mg IV, and consult to cardiology.    5:55 PM Blood pressure was then taken and was 98/68.     5:58 PM Dr. Braden notified of low pressure and stated to just continue to monitor HR and consult cardiology.

## 2020-01-12 LAB
ANION GAP SERPL CALC-SCNC: 13 MMOL/L (ref 8–16)
AORTIC ROOT ANNULUS: 3.12 CM
AORTIC VALVE CUSP SEPERATION: 1.62 CM
ASCENDING AORTA: 3.29 CM
AV INDEX (PROSTH): 0.83
AV MEAN GRADIENT: 4 MMHG
AV PEAK GRADIENT: 6 MMHG
AV VALVE AREA: 3.43 CM2
AV VELOCITY RATIO: 0.73
BSA FOR ECHO PROCEDURE: 1.57 M2
BUN SERPL-MCNC: 37 MG/DL (ref 8–23)
CALCIUM SERPL-MCNC: 8.3 MG/DL (ref 8.7–10.5)
CHLORIDE SERPL-SCNC: 104 MMOL/L (ref 95–110)
CO2 SERPL-SCNC: 23 MMOL/L (ref 23–29)
CREAT SERPL-MCNC: 1.3 MG/DL (ref 0.5–1.4)
CV ECHO LV RWT: 0.36 CM
DOP CALC AO PEAK VEL: 1.25 M/S
DOP CALC AO VTI: 21.62 CM
DOP CALC LVOT AREA: 4.2 CM2
DOP CALC LVOT DIAMETER: 2.3 CM
DOP CALC LVOT PEAK VEL: 0.91 M/S
DOP CALC LVOT STROKE VOLUME: 74.08 CM3
DOP CALCLVOT PEAK VEL VTI: 17.84 CM
ECHO LV POSTERIOR WALL: 0.84 CM (ref 0.6–1.1)
EST. GFR  (AFRICAN AMERICAN): 45 ML/MIN/1.73 M^2
EST. GFR  (NON AFRICAN AMERICAN): 39 ML/MIN/1.73 M^2
FRACTIONAL SHORTENING: 22 % (ref 28–44)
GLUCOSE SERPL-MCNC: 196 MG/DL (ref 70–110)
INTERVENTRICULAR SEPTUM: 0.97 CM (ref 0.6–1.1)
IVRT: 0.07 MSEC
LA MAJOR: 5.6 CM
LA MINOR: 5.42 CM
LA WIDTH: 3.53 CM
LEFT ATRIUM SIZE: 4.45 CM
LEFT ATRIUM VOLUME INDEX: 46.5 ML/M2
LEFT ATRIUM VOLUME: 73.55 CM3
LEFT INTERNAL DIMENSION IN SYSTOLE: 3.71 CM (ref 2.1–4)
LEFT VENTRICLE DIASTOLIC VOLUME INDEX: 65.64 ML/M2
LEFT VENTRICLE DIASTOLIC VOLUME: 103.8 ML
LEFT VENTRICLE MASS INDEX: 92 G/M2
LEFT VENTRICLE SYSTOLIC VOLUME INDEX: 37 ML/M2
LEFT VENTRICLE SYSTOLIC VOLUME: 58.57 ML
LEFT VENTRICULAR INTERNAL DIMENSION IN DIASTOLE: 4.73 CM (ref 3.5–6)
LEFT VENTRICULAR MASS: 145.29 G
MV PEAK E VEL: 1.14 M/S
PISA TR MAX VEL: 2.91 M/S
POCT GLUCOSE: 143 MG/DL (ref 70–110)
POCT GLUCOSE: 168 MG/DL (ref 70–110)
POCT GLUCOSE: 208 MG/DL (ref 70–110)
POCT GLUCOSE: 385 MG/DL (ref 70–110)
POTASSIUM SERPL-SCNC: 3.9 MMOL/L (ref 3.5–5.1)
PV PEAK VELOCITY: 0.77 CM/S
RA MAJOR: 5.45 CM
RA PRESSURE: 3 MMHG
RA WIDTH: 4.04 CM
RIGHT VENTRICULAR END-DIASTOLIC DIMENSION: 3.64 CM
RV TISSUE DOPPLER FREE WALL SYSTOLIC VELOCITY 1 (APICAL 4 CHAMBER VIEW): 8.59 CM/S
SINUS: 3.25 CM
SODIUM SERPL-SCNC: 140 MMOL/L (ref 136–145)
STJ: 3.01 CM
TR MAX PG: 34 MMHG
TRICUSPID ANNULAR PLANE SYSTOLIC EXCURSION: 1.07 CM
TV REST PULMONARY ARTERY PRESSURE: 37 MMHG

## 2020-01-12 PROCEDURE — 36415 COLL VENOUS BLD VENIPUNCTURE: CPT

## 2020-01-12 PROCEDURE — 25000003 PHARM REV CODE 250: Performed by: INTERNAL MEDICINE

## 2020-01-12 PROCEDURE — 21400001 HC TELEMETRY ROOM

## 2020-01-12 PROCEDURE — 63600175 PHARM REV CODE 636 W HCPCS: Performed by: INTERNAL MEDICINE

## 2020-01-12 PROCEDURE — 25000003 PHARM REV CODE 250: Performed by: EMERGENCY MEDICINE

## 2020-01-12 PROCEDURE — 25000003 PHARM REV CODE 250: Performed by: HOSPITALIST

## 2020-01-12 PROCEDURE — 99233 PR SUBSEQUENT HOSPITAL CARE,LEVL III: ICD-10-PCS | Mod: 25,,, | Performed by: INTERNAL MEDICINE

## 2020-01-12 PROCEDURE — 99233 SBSQ HOSP IP/OBS HIGH 50: CPT | Mod: 25,,, | Performed by: INTERNAL MEDICINE

## 2020-01-12 PROCEDURE — 25000003 PHARM REV CODE 250: Performed by: NURSE PRACTITIONER

## 2020-01-12 PROCEDURE — 80048 BASIC METABOLIC PNL TOTAL CA: CPT

## 2020-01-12 PROCEDURE — 94761 N-INVAS EAR/PLS OXIMETRY MLT: CPT

## 2020-01-12 RX ADMIN — PRAVASTATIN SODIUM 40 MG: 40 TABLET ORAL at 09:01

## 2020-01-12 RX ADMIN — CARVEDILOL 6.25 MG: 6.25 TABLET, FILM COATED ORAL at 09:01

## 2020-01-12 RX ADMIN — TRIAMCINOLONE ACETONIDE: 1 CREAM TOPICAL at 08:01

## 2020-01-12 RX ADMIN — INSULIN ASPART 12 UNITS: 100 INJECTION, SOLUTION INTRAVENOUS; SUBCUTANEOUS at 08:01

## 2020-01-12 RX ADMIN — INSULIN ASPART 12 UNITS: 100 INJECTION, SOLUTION INTRAVENOUS; SUBCUTANEOUS at 05:01

## 2020-01-12 RX ADMIN — CLOPIDOGREL BISULFATE 75 MG: 75 TABLET ORAL at 08:01

## 2020-01-12 RX ADMIN — SENNOSIDES AND DOCUSATE SODIUM 1 TABLET: 8.6; 5 TABLET ORAL at 09:01

## 2020-01-12 RX ADMIN — TRIAMCINOLONE ACETONIDE: 1 CREAM TOPICAL at 09:01

## 2020-01-12 RX ADMIN — ASPIRIN 325 MG: 325 TABLET, DELAYED RELEASE ORAL at 08:01

## 2020-01-12 RX ADMIN — FUROSEMIDE 40 MG: 40 TABLET ORAL at 08:01

## 2020-01-12 RX ADMIN — INSULIN ASPART 12 UNITS: 100 INJECTION, SOLUTION INTRAVENOUS; SUBCUTANEOUS at 12:01

## 2020-01-12 RX ADMIN — CARVEDILOL 6.25 MG: 6.25 TABLET, FILM COATED ORAL at 08:01

## 2020-01-12 RX ADMIN — SENNOSIDES AND DOCUSATE SODIUM 1 TABLET: 8.6; 5 TABLET ORAL at 08:01

## 2020-01-12 RX ADMIN — PANTOPRAZOLE SODIUM 40 MG: 40 TABLET, DELAYED RELEASE ORAL at 08:01

## 2020-01-12 RX ADMIN — LOSARTAN POTASSIUM 25 MG: 25 TABLET ORAL at 08:01

## 2020-01-12 NOTE — PLAN OF CARE
Problem: Fall Injury Risk  Goal: Absence of Fall and Fall-Related Injury  Outcome: Ongoing, Progressing  Intervention: Identify and Manage Contributors to Fall Injury Risk  Flowsheets (Taken 1/12/2020 1712)  Self-Care Promotion: BADL personal objects within reach; BADL personal routines maintained  Medication Review/Management: medications reviewed; high risk medications identified     Problem: Fall Injury Risk  Goal: Absence of Fall and Fall-Related Injury  Intervention: Identify and Manage Contributors to Fall Injury Risk  Flowsheets (Taken 1/12/2020 1713)  Self-Care Promotion: BADL personal objects within reach; BADL personal routines maintained  Medication Review/Management: medications reviewed; high risk medications identified

## 2020-01-12 NOTE — SUBJECTIVE & OBJECTIVE
Interval History:  Doing fine.  Denies any complaints.    Review of Systems   Constitution: Negative.   HENT: Negative.    Eyes: Negative.    Cardiovascular: Negative.  Negative for chest pain, dyspnea on exertion and leg swelling.   Respiratory: Negative.    Endocrine: Negative.    Hematologic/Lymphatic: Negative.    Skin: Positive for itching and rash.   Musculoskeletal: Negative.    Gastrointestinal: Negative.    Genitourinary: Negative.    Neurological: Negative.    Psychiatric/Behavioral: Positive for altered mental status. The patient is nervous/anxious.    Allergic/Immunologic: Negative.      Objective:     Vital Signs (Most Recent):  Temp: 98.2 °F (36.8 °C) (01/12/20 1113)  Pulse: 103 (01/12/20 1113)  Resp: 18 (01/12/20 1113)  BP: (!) 121/55 (01/12/20 1113)  SpO2: 97 % (01/12/20 1113) Vital Signs (24h Range):  Temp:  [97.3 °F (36.3 °C)-98.3 °F (36.8 °C)] 98.2 °F (36.8 °C)  Pulse:  [] 103  Resp:  [16-18] 18  SpO2:  [93 %-98 %] 97 %  BP: ()/(51-60) 121/55     Weight: 55 kg (121 lb 4.1 oz)  Body mass index is 20.81 kg/m².     SpO2: 97 %  O2 Device (Oxygen Therapy): room air    No intake or output data in the 24 hours ending 01/12/20 1314    Lines/Drains/Airways     Peripheral Intravenous Line                 Midline Catheter Insertion/Assessment  - Single Lumen 01/10/20 1245 Right basilic vein (medial side of arm) other (see comments) 2 days                Physical Exam   Constitutional: She is oriented to person, place, and time. She appears well-developed and well-nourished.   HENT:   Head: Normocephalic.   Eyes: Pupils are equal, round, and reactive to light.   Neck: Normal range of motion. Neck supple.   Cardiovascular: Normal rate. An irregularly irregular rhythm present.   Pulmonary/Chest: Effort normal and breath sounds normal.   Abdominal: Soft. Normal appearance and bowel sounds are normal. There is no tenderness.   Musculoskeletal: Normal range of motion.   Neurological: She is alert and  oriented to person, place, and time.   Skin: Skin is warm. Lesion noted.   Psychiatric: She has a normal mood and affect.       Significant Labs:   BMP:   Recent Labs   Lab 01/11/20  0521 01/12/20  0554   * 196*   * 140   K 4.0 3.9    104   CO2 20* 23   BUN 25* 37*   CREATININE 1.0 1.3   CALCIUM 8.7 8.3*   , CMP   Recent Labs   Lab 01/11/20  0521 01/12/20  0554   * 140   K 4.0 3.9    104   CO2 20* 23   * 196*   BUN 25* 37*   CREATININE 1.0 1.3   CALCIUM 8.7 8.3*   ANIONGAP 11 13   ESTGFRAFRICA >60 45*   EGFRNONAA 54* 39*   , CBC   Recent Labs   Lab 01/11/20  0521   WBC 7.59   HGB 10.0*   HCT 31.1*   *   , INR No results for input(s): INR, PROTIME in the last 48 hours., Lipid Panel No results for input(s): CHOL, HDL, LDLCALC, TRIG, CHOLHDL in the last 48 hours., Troponin No results for input(s): TROPONINI in the last 48 hours. and All pertinent lab results from the last 24 hours have been reviewed.    Significant Imaging: Echocardiogram:   Transthoracic echo (TTE) complete (Cupid Only):   Results for orders placed or performed during the hospital encounter of 01/08/20   Echo Color Flow Doppler? Yes   Result Value Ref Range    BSA 1.57 m2    LA WIDTH 3.53 cm    AORTIC VALVE CUSP SEPERATION 1.62 cm    PV PEAK VELOCITY 0.77 cm/s    LVIDD 4.73 3.5 - 6.0 cm    IVS 0.97 0.6 - 1.1 cm    PW 0.84 0.6 - 1.1 cm    Ao root annulus 3.12 cm    LVIDS 3.71 2.1 - 4.0 cm    FS 22 28 - 44 %    LA volume 73.55 cm3    Sinus 3.25 cm    STJ 3.01 cm    Ascending aorta 3.29 cm    LV mass 145.29 g    LA size 4.45 cm    RVDD 3.64 cm    TAPSE 1.07 cm    RV S' 8.59 cm/s    Left Ventricle Relative Wall Thickness 0.36 cm    AV mean gradient 4 mmHg    AV valve area 3.43 cm2    AV Velocity Ratio 0.73     AV index (prosthetic) 0.83     IVRT 0.07 msec    LVOT diameter 2.30 cm    LVOT area 4.2 cm2    LVOT peak lianet 0.91 m/s    LVOT peak VTI 17.84 cm    Ao peak lianet 1.25 m/s    Ao VTI 21.62 cm    LVOT stroke  volume 74.08 cm3    AV peak gradient 6 mmHg    MV Peak E Francesco 1.14 m/s    TR Max Francesco 2.91 m/s    LV Systolic Volume 58.57 mL    LV Systolic Volume Index 37.0 mL/m2    LV Diastolic Volume 103.80 mL    LV Diastolic Volume Index 65.64 mL/m2    LA Volume Index 46.5 mL/m2    LV Mass Index 92 g/m2    RA Major Axis 5.45 cm    Left Atrium Minor Axis 5.42 cm    Left Atrium Major Axis 5.60 cm    Triscuspid Valve Regurgitation Peak Gradient 34 mmHg    RA Width 4.04 cm

## 2020-01-12 NOTE — NURSING
Note that patient continues as confused however, behavior much improved this am;   Two-point wrist restraints discontinued;  C/o itching to abdomen and back;   Eating breakfast tray and watching TV;     Will apply scheduled antipruitic cream with meds;

## 2020-01-12 NOTE — ASSESSMENT & PLAN NOTE
"  See physical exam. Patient was seen by Dermatology Dr. Hinton in Sept for similar lesion but not this extensive. Lesion cleared with steroid injections? X 2. Daughter Ayse reports biopsy showed "Dermatitis of unknown origin."   Denies any new antibiotics up untial 12/29 placed on clindamycin for "cellulits" when [facility] [address].   Will hold clindamycin  Consult Dermatology -Called office [phone] at approx 1030 and again 1100 however provider will not available until 1 pm.  Consult wound care to assist.       Dermatology recommended start IV and topical steroids for Pemphigus Vulgaris.   IV solumedrol 30 mg IV BID x 5 days  Triamcinolone 0.1 % cream BID   For punch biopsy today  If no improvement in 5 days, may need to start IVIG.     Assume derm will see patient tomorrow?        "

## 2020-01-12 NOTE — PROGRESS NOTES
[facility]  Cardiology  Progress Note    Patient Name: Ruthann Doe  MRN: [id number]  Admission Date: 1/8/2020  Hospital Length of Stay: 3 days  Code Status: DNR   Attending Physician: Sammy Perales MD   Primary Care Physician: Jesse Partida MD  Expected Discharge Date:   Principal Problem:Pemphigus vulgaris    Subjective:       Interval History:  Doing fine.  Denies any complaints.    Review of Systems   Constitution: Negative.   HENT: Negative.    Eyes: Negative.    Cardiovascular: Negative.  Negative for chest pain, dyspnea on exertion and leg swelling.   Respiratory: Negative.    Endocrine: Negative.    Hematologic/Lymphatic: Negative.    Skin: Positive for itching and rash.   Musculoskeletal: Negative.    Gastrointestinal: Negative.    Genitourinary: Negative.    Neurological: Negative.    Psychiatric/Behavioral: Positive for altered mental status. The patient is nervous/anxious.    Allergic/Immunologic: Negative.      Objective:     Vital Signs (Most Recent):  Temp: 98.2 °F (36.8 °C) (01/12/20 1113)  Pulse: 103 (01/12/20 1113)  Resp: 18 (01/12/20 1113)  BP: (!) 121/55 (01/12/20 1113)  SpO2: 97 % (01/12/20 1113) Vital Signs (24h Range):  Temp:  [97.3 °F (36.3 °C)-98.3 °F (36.8 °C)] 98.2 °F (36.8 °C)  Pulse:  [] 103  Resp:  [16-18] 18  SpO2:  [93 %-98 %] 97 %  BP: ()/(51-60) 121/55     Weight: 55 kg (121 lb 4.1 oz)  Body mass index is 20.81 kg/m².     SpO2: 97 %  O2 Device (Oxygen Therapy): room air    No intake or output data in the 24 hours ending 01/12/20 1314    Lines/Drains/Airways     Peripheral Intravenous Line                 Midline Catheter Insertion/Assessment  - Single Lumen 01/10/20 1245 Right basilic vein (medial side of arm) other (see comments) 2 days                Physical Exam   Constitutional: She is oriented to person, place, and time. She appears well-developed and well-nourished.   HENT:   Head: Normocephalic.   Eyes: Pupils are equal, round,  and reactive to light.   Neck: Normal range of motion. Neck supple.   Cardiovascular: Normal rate. An irregularly irregular rhythm present.   Pulmonary/Chest: Effort normal and breath sounds normal.   Abdominal: Soft. Normal appearance and bowel sounds are normal. There is no tenderness.   Musculoskeletal: Normal range of motion.   Neurological: She is alert and oriented to person, place, and time.   Skin: Skin is warm. Lesion noted.   Psychiatric: She has a normal mood and affect.       Significant Labs:   BMP:   Recent Labs   Lab 01/11/20 0521 01/12/20  0554   * 196*   * 140   K 4.0 3.9    104   CO2 20* 23   BUN 25* 37*   CREATININE 1.0 1.3   CALCIUM 8.7 8.3*   , CMP   Recent Labs   Lab 01/11/20 0521 01/12/20  0554   * 140   K 4.0 3.9    104   CO2 20* 23   * 196*   BUN 25* 37*   CREATININE 1.0 1.3   CALCIUM 8.7 8.3*   ANIONGAP 11 13   ESTGFRAFRICA >60 45*   EGFRNONAA 54* 39*   , CBC   Recent Labs   Lab 01/11/20 0521   WBC 7.59   HGB 10.0*   HCT 31.1*   *   , INR No results for input(s): INR, PROTIME in the last 48 hours., Lipid Panel No results for input(s): CHOL, HDL, LDLCALC, TRIG, CHOLHDL in the last 48 hours., Troponin No results for input(s): TROPONINI in the last 48 hours. and All pertinent lab results from the last 24 hours have been reviewed.    Significant Imaging: Echocardiogram:   Transthoracic echo (TTE) complete (Cupid Only):   Results for orders placed or performed during the hospital encounter of 01/08/20   Echo Color Flow Doppler? Yes   Result Value Ref Range    BSA 1.57 m2    LA WIDTH 3.53 cm    AORTIC VALVE CUSP SEPERATION 1.62 cm    PV PEAK VELOCITY 0.77 cm/s    LVIDD 4.73 3.5 - 6.0 cm    IVS 0.97 0.6 - 1.1 cm    PW 0.84 0.6 - 1.1 cm    Ao root annulus 3.12 cm    LVIDS 3.71 2.1 - 4.0 cm    FS 22 28 - 44 %    LA volume 73.55 cm3    Sinus 3.25 cm    STJ 3.01 cm    Ascending aorta 3.29 cm    LV mass 145.29 g    LA size 4.45 cm    RVDD 3.64 cm    TAPSE  1.07 cm    RV S' 8.59 cm/s    Left Ventricle Relative Wall Thickness 0.36 cm    AV mean gradient 4 mmHg    AV valve area 3.43 cm2    AV Velocity Ratio 0.73     AV index (prosthetic) 0.83     IVRT 0.07 msec    LVOT diameter 2.30 cm    LVOT area 4.2 cm2    LVOT peak francesco 0.91 m/s    LVOT peak VTI 17.84 cm    Ao peak francesco 1.25 m/s    Ao VTI 21.62 cm    LVOT stroke volume 74.08 cm3    AV peak gradient 6 mmHg    MV Peak E Francesco 1.14 m/s    TR Max Francesco 2.91 m/s    LV Systolic Volume 58.57 mL    LV Systolic Volume Index 37.0 mL/m2    LV Diastolic Volume 103.80 mL    LV Diastolic Volume Index 65.64 mL/m2    LA Volume Index 46.5 mL/m2    LV Mass Index 92 g/m2    RA Major Axis 5.45 cm    Left Atrium Minor Axis 5.42 cm    Left Atrium Major Axis 5.60 cm    Triscuspid Valve Regurgitation Peak Gradient 34 mmHg    RA Width 4.04 cm     Assessment and Plan:     Brief HPI:     Paroxysmal atrial fibrillation  Currently in AFib.  I saw AFib on past EKGs in 2018 also.  Asymptomatic from the AFib.  Had a detailed discussion with the primary team.  Patient has frequent falls, history of medication noncompliance as well as dementia with behavioral disturbances.  This puts at her at high risk for anticoagulation, especially because of frequent falls.    Chronic combined systolic and diastolic heart failure  Patient with known systolic dysfunction.  Last EF of 25-30%.  Currently euvolemic.  Recommend Coreg and ARB  Check echo    Recurrent falls        Noncompliance with medication regimen        Myocardial infarction, old        CAD (coronary artery disease)  Stress test in 2018 did not demonstrate any ischemia.  Showed fixed defect.  Denies any chest pains.  No further ischemic workup is indicated during this hospitalization.        VTE Risk Mitigation (From admission, onward)         Ordered     IP VTE HIGH RISK PATIENT  Once      01/09/20 0815     Place CHANO hose  Until discontinued      01/09/20 0815     Place sequential compression device   Until discontinued      01/09/20 0815                Osmar Pastor MD  Cardiology  [facility]    Will sign off. Please f/u in cardiology clinic

## 2020-01-12 NOTE — NURSING
Note that patient anxious and  continues as confused asking to get out of bed;   Restraints adjusted so that patient could feed herself dinner;  She did well with feeding herself;     Report given to evening-shift SHANTELL Steward;   Safety sitter at bedside in addition to [employer] for patient's safety;     Restraint order expires 01/12/2020 at 0738;

## 2020-01-12 NOTE — PROGRESS NOTES
[facility] Hospital Medicine  Progress Note    Patient Name: Ruthann Doe  MRN: [id number]  Patient Class: IP- Inpatient   Admission Date: 1/8/2020  Length of Stay: 3 days  Attending Physician: Sammy Perales MD  Primary Care Provider: Jesse Partida MD        Subjective:     Principal Problem:Pemphigus vulgaris        HPI:  Mrs. Doe is a 78 yo female with significant hypertension, hyperlipidemia, diabetes type 2, dementia, PPM/AICD?, CAD status post MI, chronic systolic diastolic heart failure, and pulmonary hypertension who was brought by Daughter Ayse to hospital for worsen dementia, recurrent falls and need for NHP. Patient lives alone. Daughter Ayse have been estranged from patient for over 10 years but stepped in few years ago to assist. Daughter Ayse reports patient dementia with visual /aduitory hallucinations for over 2 years and consisting of dead people and cats for 2 years. Baseline ambulates with walker up until 10 days ago.    Patient also found to have diffuse body lesions consisting for blisters and scab over lesion through out body sparring the buttock and perineum. Patient had similar lesions in Sept however only on torso which cleared up with steroid injections in Sept and October. By end of November, lesions completely resolved. 10 days ago patient was brought to [facility] [address] then told had cellulitis prescribed clindamycin 300 mg TID.    CXR no acute process. CTH  No acute intracranial abnormality. Afebrile and no leukocytosis. UA no evidence of infection.     Overview/Hospital Course:  Mrs. Doe is a 78 yo female with significant hypertension, hyperlipidemia, diabetes type 2, dementia, PPM/AICD?, CAD status post MI, chronic systolic diastolic heart failure, and pulmonary hypertension who was brought by Daughter Ayse to hospital for worsen dementia, recurrent falls and need for NHP. Patient lives alone. Daughter Ayse have been estranged from  patient for over 10 years but stepped in few years ago to assist. Daughter Ayse reports patient dementia with visual /aduitory hallucinations for over 2 years and consisting of dead people and cats for 2 years. Baseline ambulates with walker up until 10 days ago.    Patient also found to have diffuse body lesions consisting for blisters and scab over lesion through out body sparring the buttock and perineum. Patient had similar lesions in Sept however only on torso which cleared up with steroid injections in Sept and October. By end of November, lesions completely resolved. 10 days ago patient was brought to [facility] then told had cellulitis prescribed clindamycin 300 mg TID.    CXR no acute process. CTH  No acute intracranial abnormality. Afebrile and no leukocytosis. UA no evidence of infection.  PT/OT evaluated the patient and recommended SNF. Patient was found to be in A-fib and cards consulted. No NOAC due to frequent falls and non-compliance.            Interval History: No new issues     Review of Systems   Constitutional: Negative for activity change.   HENT: Negative for congestion.    Respiratory: Negative for chest tightness and shortness of breath.    Cardiovascular: Negative for chest pain.   Genitourinary: Negative for difficulty urinating.   Musculoskeletal: Negative for arthralgias.   Psychiatric/Behavioral: Negative for agitation.     Objective:     Vital Signs (Most Recent):  Temp: 98.2 °F (36.8 °C) (01/12/20 1113)  Pulse: 103 (01/12/20 1113)  Resp: 18 (01/12/20 1113)  BP: (!) 121/55 (01/12/20 1113)  SpO2: 97 % (01/12/20 1113) Vital Signs (24h Range):  Temp:  [97.3 °F (36.3 °C)-98.3 °F (36.8 °C)] 98.2 °F (36.8 °C)  Pulse:  [] 103  Resp:  [16-18] 18  SpO2:  [93 %-98 %] 97 %  BP: ()/(51-67) 121/55     Weight: 55 kg (121 lb 4.1 oz)  Body mass index is 20.81 kg/m².  No intake or output data in the 24 hours ending 01/12/20 1115   Physical Exam   Constitutional: She is oriented  "to person, place, and time. She appears well-developed and well-nourished.   HENT:   Head: Normocephalic and atraumatic.   Neurological: She is alert and oriented to person, place, and time.   Skin: Skin is warm and dry.   Psychiatric: She has a normal mood and affect. Her behavior is normal.   Nursing note and vitals reviewed.      Significant Labs:   BMP:   Recent Labs   Lab 01/12/20  0554   *      K 3.9      CO2 23   BUN 37*   CREATININE 1.3   CALCIUM 8.3*     CBC:   Recent Labs   Lab 01/11/20  0521   WBC 7.59   HGB 10.0*   HCT 31.1*   *       Significant Imaging:       Assessment/Plan:      * Pemphigus vulgaris    See physical exam. Patient was seen by Dermatology Dr. Hinton in Sept for similar lesion but not this extensive. Lesion cleared with steroid injections? X 2. Daughter Ayse reports biopsy showed "Dermatitis of unknown origin."   Denies any new antibiotics up untial 12/29 placed on clindamycin for "cellulits" when seen by [facility] [address].   Will hold clindamycin  Consult Dermatology -Called office [phone] at approx 1030 and again 1100 however provider will not available until 1 pm.  Consult wound care to assist.       Dermatology recommended start IV and topical steroids for Pemphigus Vulgaris.   IV solumedrol 30 mg IV BID x 5 days  Triamcinolone 0.1 % cream BID   For punch biopsy today  If no improvement in 5 days, may need to start IVIG.     Assume derm will see patient tomorrow?          Paroxysmal atrial fibrillation  No NOAC per cards. Rate controlled       Chronic combined systolic and diastolic heart failure  EF of 25-30%,Euvolemic. Not on diuretics at home.  Continue metoprolol. Resume ACEI/ARB if renal function stable and BP tolerates.       Pressure injury of left heel, unstageable  Apply boot. Wound care consult       Recurrent falls  See above #1.     PT/OT rec: SNF.   Likely not a candidate for anti-coagulation due to falls.        Diffuse Skin Breakdown and " Lesions  See above      CKD stage 2 due to type 2 diabetes mellitus  Stable. Repeat labs pending.     With some pre-renal failure. Will hold lasix       Anemia, chronic disease  Obtain iron studies, vitamin B 12, folate level      Dementia without behavioral disturbance  Per patient, history of dementia with visual auditory hallucinations x 2 years. Baseline oriented to self and place but not time.   EKG accelerated Junctional 72. Troponin x1  Lactate normal.  UA no infection. CTH no acute abnormality.   Contributing to falls (Dec 4, Jan 3, and yesterday Jaison 8 x 2 ). All the falls have been unwitnessed as patient lives alone.  Currently mental status at baseline  PT/OT consult  Consulted  for NH placement.      Type 2 diabetes mellitus with hyperglycemia  Lab Results   Component Value Date    HGBA1C 7.4 (H) 01/09/2020   obtain A1c.   SSI and adjust accordingly,  On prandial ,basal,and SSI at [facility].    Increasing insulin for solumedrol        AICD (automatic cardioverter/defibrillator) present  No current issues.       Essential hypertension  Continue metoprolol. Resume ramipril ? Hydralazine prn.       CAD (coronary artery disease)  Daughter Ayse reports history of MI/stents/AICD/PPM but not sure dates and previous test.   Denies chest pain or shortness of breath  Continue ASA, plavix, metoprolol. Previously on ramipril.        VTE Risk Mitigation (From admission, onward)         Ordered     IP VTE HIGH RISK PATIENT  Once      01/09/20 0815     Place CHANO hose  Until discontinued      01/09/20 0815     Place sequential compression device  Until discontinued      01/09/20 0815              Hold lasix today. Continue IV steroids.  To SNF when arranged.           Sammy Rodrigues MD  Department of Hospital Medicine   [facility]

## 2020-01-12 NOTE — SUBJECTIVE & OBJECTIVE
Interval History: No new issues     Review of Systems   Constitutional: Negative for activity change.   HENT: Negative for congestion.    Respiratory: Negative for chest tightness and shortness of breath.    Cardiovascular: Negative for chest pain.   Genitourinary: Negative for difficulty urinating.   Musculoskeletal: Negative for arthralgias.   Psychiatric/Behavioral: Negative for agitation.     Objective:     Vital Signs (Most Recent):  Temp: 98.2 °F (36.8 °C) (01/12/20 1113)  Pulse: 103 (01/12/20 1113)  Resp: 18 (01/12/20 1113)  BP: (!) 121/55 (01/12/20 1113)  SpO2: 97 % (01/12/20 1113) Vital Signs (24h Range):  Temp:  [97.3 °F (36.3 °C)-98.3 °F (36.8 °C)] 98.2 °F (36.8 °C)  Pulse:  [] 103  Resp:  [16-18] 18  SpO2:  [93 %-98 %] 97 %  BP: ()/(51-67) 121/55     Weight: 55 kg (121 lb 4.1 oz)  Body mass index is 20.81 kg/m².  No intake or output data in the 24 hours ending 01/12/20 1115   Physical Exam   Constitutional: She is oriented to person, place, and time. She appears well-developed and well-nourished.   HENT:   Head: Normocephalic and atraumatic.   Neurological: She is alert and oriented to person, place, and time.   Skin: Skin is warm and dry.   Psychiatric: She has a normal mood and affect. Her behavior is normal.   Nursing note and vitals reviewed.      Significant Labs:   BMP:   Recent Labs   Lab 01/12/20  0554   *      K 3.9      CO2 23   BUN 37*   CREATININE 1.3   CALCIUM 8.3*     CBC:   Recent Labs   Lab 01/11/20  0521   WBC 7.59   HGB 10.0*   HCT 31.1*   *       Significant Imaging:

## 2020-01-13 LAB
ANION GAP SERPL CALC-SCNC: 10 MMOL/L (ref 8–16)
BUN SERPL-MCNC: 40 MG/DL (ref 8–23)
CALCIUM SERPL-MCNC: 8.6 MG/DL (ref 8.7–10.5)
CHLORIDE SERPL-SCNC: 103 MMOL/L (ref 95–110)
CO2 SERPL-SCNC: 25 MMOL/L (ref 23–29)
CREAT SERPL-MCNC: 1.3 MG/DL (ref 0.5–1.4)
EST. GFR  (AFRICAN AMERICAN): 45 ML/MIN/1.73 M^2
EST. GFR  (NON AFRICAN AMERICAN): 39 ML/MIN/1.73 M^2
GLUCOSE SERPL-MCNC: 176 MG/DL (ref 70–110)
POCT GLUCOSE: 157 MG/DL (ref 70–110)
POCT GLUCOSE: 218 MG/DL (ref 70–110)
POCT GLUCOSE: 299 MG/DL (ref 70–110)
POCT GLUCOSE: >500 MG/DL (ref 70–110)
POTASSIUM SERPL-SCNC: 3.8 MMOL/L (ref 3.5–5.1)
SODIUM SERPL-SCNC: 138 MMOL/L (ref 136–145)

## 2020-01-13 PROCEDURE — 25000003 PHARM REV CODE 250: Performed by: INTERNAL MEDICINE

## 2020-01-13 PROCEDURE — 36415 COLL VENOUS BLD VENIPUNCTURE: CPT

## 2020-01-13 PROCEDURE — 25000003 PHARM REV CODE 250: Performed by: EMERGENCY MEDICINE

## 2020-01-13 PROCEDURE — 25000003 PHARM REV CODE 250: Performed by: HOSPITALIST

## 2020-01-13 PROCEDURE — 25000003 PHARM REV CODE 250: Performed by: NURSE PRACTITIONER

## 2020-01-13 PROCEDURE — 63600175 PHARM REV CODE 636 W HCPCS: Performed by: INTERNAL MEDICINE

## 2020-01-13 PROCEDURE — 80048 BASIC METABOLIC PNL TOTAL CA: CPT

## 2020-01-13 PROCEDURE — 21400001 HC TELEMETRY ROOM

## 2020-01-13 RX ORDER — DIPHENHYDRAMINE HYDROCHLORIDE 50 MG/ML
25 INJECTION INTRAMUSCULAR; INTRAVENOUS EVERY 6 HOURS PRN
Status: DISCONTINUED | OUTPATIENT
Start: 2020-01-13 | End: 2020-01-20 | Stop reason: HOSPADM

## 2020-01-13 RX ORDER — HYDROXYZINE PAMOATE 25 MG/1
25 CAPSULE ORAL EVERY 6 HOURS PRN
Status: DISCONTINUED | OUTPATIENT
Start: 2020-01-13 | End: 2020-01-20 | Stop reason: HOSPADM

## 2020-01-13 RX ADMIN — INSULIN ASPART 12 UNITS: 100 INJECTION, SOLUTION INTRAVENOUS; SUBCUTANEOUS at 12:01

## 2020-01-13 RX ADMIN — PRAVASTATIN SODIUM 40 MG: 40 TABLET ORAL at 09:01

## 2020-01-13 RX ADMIN — HYDROXYZINE PAMOATE 25 MG: 25 CAPSULE ORAL at 03:01

## 2020-01-13 RX ADMIN — SENNOSIDES AND DOCUSATE SODIUM 1 TABLET: 8.6; 5 TABLET ORAL at 09:01

## 2020-01-13 RX ADMIN — LOSARTAN POTASSIUM 25 MG: 25 TABLET ORAL at 09:01

## 2020-01-13 RX ADMIN — METHYLPREDNISOLONE SODIUM SUCCINATE 40 MG: 40 INJECTION, POWDER, FOR SOLUTION INTRAMUSCULAR; INTRAVENOUS at 10:01

## 2020-01-13 RX ADMIN — DIPHENHYDRAMINE HYDROCHLORIDE 25 MG: 50 INJECTION INTRAMUSCULAR; INTRAVENOUS at 04:01

## 2020-01-13 RX ADMIN — CLOPIDOGREL BISULFATE 75 MG: 75 TABLET ORAL at 09:01

## 2020-01-13 RX ADMIN — CARVEDILOL 6.25 MG: 6.25 TABLET, FILM COATED ORAL at 09:01

## 2020-01-13 RX ADMIN — INSULIN ASPART 12 UNITS: 100 INJECTION, SOLUTION INTRAVENOUS; SUBCUTANEOUS at 09:01

## 2020-01-13 RX ADMIN — TRIAMCINOLONE ACETONIDE: 1 CREAM TOPICAL at 09:01

## 2020-01-13 RX ADMIN — ASPIRIN 325 MG: 325 TABLET, DELAYED RELEASE ORAL at 09:01

## 2020-01-13 RX ADMIN — PANTOPRAZOLE SODIUM 40 MG: 40 TABLET, DELAYED RELEASE ORAL at 09:01

## 2020-01-13 RX ADMIN — METHYLPREDNISOLONE SODIUM SUCCINATE 40 MG: 40 INJECTION, POWDER, FOR SOLUTION INTRAMUSCULAR; INTRAVENOUS at 03:01

## 2020-01-13 RX ADMIN — INSULIN ASPART 12 UNITS: 100 INJECTION, SOLUTION INTRAVENOUS; SUBCUTANEOUS at 05:01

## 2020-01-13 RX ADMIN — HYDROXYZINE PAMOATE 25 MG: 25 CAPSULE ORAL at 01:01

## 2020-01-13 NOTE — PROGRESS NOTES
[facility] Hospital Medicine  Progress Note    Patient Name: Ruthann Doe  MRN: [id number]  Patient Class: IP- Inpatient   Admission Date: 1/8/2020  Length of Stay: 4 days  Attending Physician: Sammy Perales MD  Primary Care Provider: Jesse Partida MD        Subjective:     Principal Problem:Pemphigus vulgaris        HPI:  Mrs. Doe is a 80 yo female with significant hypertension, hyperlipidemia, diabetes type 2, dementia, PPM/AICD?, CAD status post MI, chronic systolic diastolic heart failure, and pulmonary hypertension who was brought by Daughter Ayse to hospital for worsen dementia, recurrent falls and need for NHP. Patient lives alone. Daughter Ayse have been estranged from patient for over 10 years but stepped in few years ago to assist. Daughter Ayse reports patient dementia with visual /aduitory hallucinations for over 2 years and consisting of dead people and cats for 2 years. Baseline ambulates with walker up until 10 days ago.    Patient also found to have diffuse body lesions consisting for blisters and scab over lesion through out body sparring the buttock and perineum. Patient had similar lesions in Sept however only on torso which cleared up with steroid injections in Sept and October. By end of November, lesions completely resolved. 10 days ago patient was brought to [facility] on [address] then told had cellulitis prescribed clindamycin 300 mg TID.    CXR no acute process. CTH  No acute intracranial abnormality. Afebrile and no leukocytosis. UA no evidence of infection.     Overview/Hospital Course:  Mrs. Doe is a 80 yo female with significant hypertension, hyperlipidemia, diabetes type 2, dementia, PPM/AICD?, CAD status post MI, chronic systolic diastolic heart failure, and pulmonary hypertension who was brought by Daughter Ayse to hospital for worsen dementia, recurrent falls and need for NHP. Patient lives alone. Daughter Ayse have been estranged from  patient for over 10 years but stepped in few years ago to assist. Daughter Ayse reports patient dementia with visual /aduitory hallucinations for over 2 years and consisting of dead people and cats for 2 years. Baseline ambulates with walker up until 10 days ago.    Patient also found to have diffuse body lesions consisting for blisters and scab over lesion through out body sparring the buttock and perineum. Patient had similar lesions in Sept however only on torso which cleared up with steroid injections in Sept and October. By end of November, lesions completely resolved. 10 days ago patient was brought to [facility] then told had cellulitis prescribed clindamycin 300 mg TID.    CXR no acute process. CTH  No acute intracranial abnormality. Afebrile and no leukocytosis. UA no evidence of infection.  PT/OT evaluated the patient and recommended SNF. Patient was found to be in A-fib and cards consulted. No NOAC due to frequent falls and non-compliance.  Dermatology was consulted and diagnosed patient with pemphigus vulgaris.  The patient was started on IV steroids.         Interval History:  No new issues.     Review of Systems   Constitutional: Negative for activity change.   HENT: Negative for congestion.    Respiratory: Negative for chest tightness and shortness of breath.    Cardiovascular: Negative for chest pain.   Genitourinary: Negative for difficulty urinating.   Musculoskeletal: Negative for arthralgias.   Psychiatric/Behavioral: Negative for agitation.     Objective:     Vital Signs (Most Recent):  Temp: 98.1 °F (36.7 °C) (01/13/20 0809)  Pulse: 89 (01/13/20 0809)  Resp: 16 (01/13/20 0809)  BP: 139/71 (01/13/20 0809)  SpO2: 97 % (01/13/20 0809) Vital Signs (24h Range):  Temp:  [97.4 °F (36.3 °C)-98.2 °F (36.8 °C)] 98.1 °F (36.7 °C)  Pulse:  [] 89  Resp:  [16-18] 16  SpO2:  [95 %-97 %] 97 %  BP: ()/(49-74) 139/71     Weight: 55 kg (121 lb 4.1 oz)  Body mass index is 20.81  "kg/m².    Intake/Output Summary (Last 24 hours) at 1/13/2020 1051  Last data filed at 1/13/2020 0800  Gross per 24 hour   Intake 540 ml   Output --   Net 540 ml      Physical Exam   Constitutional: She is oriented to person, place, and time. She appears well-developed and well-nourished.   HENT:   Head: Normocephalic and atraumatic.   Neurological: She is alert and oriented to person, place, and time.   Skin: Skin is warm and dry.   Psychiatric: She has a normal mood and affect. Her behavior is normal.   Nursing note and vitals reviewed.      Significant Labs:   BMP:   Recent Labs   Lab 01/13/20  0356   *      K 3.8      CO2 25   BUN 40*   CREATININE 1.3   CALCIUM 8.6*     CBC: No results for input(s): WBC, HGB, HCT, PLT in the last 48 hours.    Significant Imaging      Assessment/Plan:      * Pemphigus vulgaris    See physical exam. Patient was seen by Dermatology Dr. Hinton in Sept for similar lesion but not this extensive. Lesion cleared with steroid injections? X 2. Daughter Ayse reports biopsy showed "Dermatitis of unknown origin."   Denies any new antibiotics up untial 12/29 placed on clindamycin for "cellulits" when seen by [facility] [address].   Will hold clindamycin  Consult Dermatology -Called office [phone] at approx 1030 and again 1100 however provider will not available until 1 pm.  Consult wound care to assist.       Dermatology recommended start IV and topical steroids for Pemphigus Vulgaris.   IV solumedrol 30 mg IV BID x 5 days  Triamcinolone 0.1 % cream BID   For punch biopsy today  If no improvement in 5 days, may need to start IVIG.     Assume derm will see patient today?    Continue steroids today.  May d/c tomorrow         Paroxysmal atrial fibrillation  No NOAC per cards. Rate controlled       Chronic combined systolic and diastolic heart failure  EF of 25-30%,Euvolemic. Not on diuretics at home.  Continue metoprolol. Resume ACEI/ARB if renal function stable and BP tolerates. "       Pressure injury of left heel, unstageable  Apply boot. Wound care consult       Recurrent falls  See above #1.     PT/OT rec: SNF.   Likely not a candidate for anti-coagulation due to falls.        Diffuse Skin Breakdown and Lesions  See above      CKD stage 2 due to type 2 diabetes mellitus  Stable. Repeat labs pending.     With some pre-renal failure. Will hold lasix       Anemia, chronic disease  Obtain iron studies, vitamin B 12, folate level      Dementia without behavioral disturbance  Per patient, history of dementia with visual auditory hallucinations x 2 years. Baseline oriented to self and place but not time.   EKG accelerated Junctional 72. Troponin x1  Lactate normal.  UA no infection. CTH no acute abnormality.   Contributing to falls (Dec 4, Jan 3, and yesterday Jaison 8 x 2 ). All the falls have been unwitnessed as patient lives alone.  Currently mental status at baseline  PT/OT consult  Consulted  for NH placement.      Type 2 diabetes mellitus with hyperglycemia  Lab Results   Component Value Date    HGBA1C 7.4 (H) 01/09/2020   obtain A1c.   SSI and adjust accordingly,  On prandial ,basal,and SSI at [facility].    Increasing insulin for solumedrol    Well controlled now.  Will have to cut back insulin once steroids stopped         AICD (automatic cardioverter/defibrillator) present  No current issues.       Essential hypertension  Continue metoprolol. Resume ramipril ? Hydralazine prn.       CAD (coronary artery disease)  Daughter Ayse reports history of MI/stents/AICD/PPM but not sure dates and previous test.   Denies chest pain or shortness of breath  Continue ASA, plavix, metoprolol. Previously on ramipril.      Debility- To SNF in next 1-2 days     VTE Risk Mitigation (From admission, onward)         Ordered     IP VTE HIGH RISK PATIENT  Once      01/09/20 0815     Place CHANO hose  Until discontinued      01/09/20 0815     Place sequential compression device  Until discontinued      01/09/20  0815                      Sammy Rodrigues MD  Department of Hospital Medicine   [facility]

## 2020-01-13 NOTE — ASSESSMENT & PLAN NOTE
Lab Results   Component Value Date    HGBA1C 7.4 (H) 01/09/2020   obtain A1c.   SSI and adjust accordingly,  On prandial ,basal,and SSI at [facility].    Increasing insulin for solumedrol    Well controlled now.  Will have to cut back insulin once steroids stopped

## 2020-01-13 NOTE — ASSESSMENT & PLAN NOTE
"  See physical exam. Patient was seen by Dermatology Dr. Hinton in Sept for similar lesion but not this extensive. Lesion cleared with steroid injections? X 2. Daughter Ayse reports biopsy showed "Dermatitis of unknown origin."   Denies any new antibiotics up untial 12/29 placed on clindamycin for "cellulits" when seen by [facility] [address].   Will hold clindamycin  Consult Dermatology -Called office [phone] at approx 1030 and again 1100 however provider will not available until 1 pm.  Consult wound care to assist.       Dermatology recommended start IV and topical steroids for Pemphigus Vulgaris.   IV solumedrol 30 mg IV BID x 5 days  Triamcinolone 0.1 % cream BID   For punch biopsy today  If no improvement in 5 days, may need to start IVIG.     Assume derm will see patient today?    Continue steroids today.  May d/c tomorrow       "

## 2020-01-13 NOTE — PLAN OF CARE
Problem: Diabetes Comorbidity  Goal: Blood Glucose Level Within Desired Range  Outcome: Ongoing, Progressing  Intervention: Maintain Glycemic Control  Flowsheets (Taken 1/13/2020 2915)  Glycemic Management: blood glucose monitoring; supplemental insulin given

## 2020-01-13 NOTE — SUBJECTIVE & OBJECTIVE
Interval History:  No new issues.     Review of Systems   Constitutional: Negative for activity change.   HENT: Negative for congestion.    Respiratory: Negative for chest tightness and shortness of breath.    Cardiovascular: Negative for chest pain.   Genitourinary: Negative for difficulty urinating.   Musculoskeletal: Negative for arthralgias.   Psychiatric/Behavioral: Negative for agitation.     Objective:     Vital Signs (Most Recent):  Temp: 98.1 °F (36.7 °C) (01/13/20 0809)  Pulse: 89 (01/13/20 0809)  Resp: 16 (01/13/20 0809)  BP: 139/71 (01/13/20 0809)  SpO2: 97 % (01/13/20 0809) Vital Signs (24h Range):  Temp:  [97.4 °F (36.3 °C)-98.2 °F (36.8 °C)] 98.1 °F (36.7 °C)  Pulse:  [] 89  Resp:  [16-18] 16  SpO2:  [95 %-97 %] 97 %  BP: ()/(49-74) 139/71     Weight: 55 kg (121 lb 4.1 oz)  Body mass index is 20.81 kg/m².    Intake/Output Summary (Last 24 hours) at 1/13/2020 1051  Last data filed at 1/13/2020 0800  Gross per 24 hour   Intake 540 ml   Output --   Net 540 ml      Physical Exam   Constitutional: She is oriented to person, place, and time. She appears well-developed and well-nourished.   HENT:   Head: Normocephalic and atraumatic.   Neurological: She is alert and oriented to person, place, and time.   Skin: Skin is warm and dry.   Psychiatric: She has a normal mood and affect. Her behavior is normal.   Nursing note and vitals reviewed.      Significant Labs:   BMP:   Recent Labs   Lab 01/13/20  0356   *      K 3.8      CO2 25   BUN 40*   CREATININE 1.3   CALCIUM 8.6*     CBC: No results for input(s): WBC, HGB, HCT, PLT in the last 48 hours.    Significant Imaging   FREE:[LAST:[Primary Pediatrician],PHONE:[(   )    -],FAX:[(   )    -],FOLLOWUP:[1-3 Days]]

## 2020-01-14 LAB
ANION GAP SERPL CALC-SCNC: 9 MMOL/L (ref 8–16)
BUN SERPL-MCNC: 31 MG/DL (ref 8–23)
CALCIUM SERPL-MCNC: 8.8 MG/DL (ref 8.7–10.5)
CHLORIDE SERPL-SCNC: 103 MMOL/L (ref 95–110)
CO2 SERPL-SCNC: 22 MMOL/L (ref 23–29)
CREAT SERPL-MCNC: 1.2 MG/DL (ref 0.5–1.4)
EST. GFR  (AFRICAN AMERICAN): 50 ML/MIN/1.73 M^2
EST. GFR  (NON AFRICAN AMERICAN): 43 ML/MIN/1.73 M^2
GLUCOSE SERPL-MCNC: 347 MG/DL (ref 70–110)
POCT GLUCOSE: 154 MG/DL (ref 70–110)
POCT GLUCOSE: 159 MG/DL (ref 70–110)
POCT GLUCOSE: 209 MG/DL (ref 70–110)
POCT GLUCOSE: 325 MG/DL (ref 70–110)
POCT GLUCOSE: 373 MG/DL (ref 70–110)
POTASSIUM SERPL-SCNC: 4.4 MMOL/L (ref 3.5–5.1)
SODIUM SERPL-SCNC: 134 MMOL/L (ref 136–145)

## 2020-01-14 PROCEDURE — 80048 BASIC METABOLIC PNL TOTAL CA: CPT

## 2020-01-14 PROCEDURE — 63600175 PHARM REV CODE 636 W HCPCS: Performed by: INTERNAL MEDICINE

## 2020-01-14 PROCEDURE — 25000003 PHARM REV CODE 250: Performed by: EMERGENCY MEDICINE

## 2020-01-14 PROCEDURE — 97116 GAIT TRAINING THERAPY: CPT

## 2020-01-14 PROCEDURE — 97535 SELF CARE MNGMENT TRAINING: CPT | Mod: CO

## 2020-01-14 PROCEDURE — 25000003 PHARM REV CODE 250: Performed by: HOSPITALIST

## 2020-01-14 PROCEDURE — 21400001 HC TELEMETRY ROOM

## 2020-01-14 PROCEDURE — 25000003 PHARM REV CODE 250: Performed by: NURSE PRACTITIONER

## 2020-01-14 PROCEDURE — 94761 N-INVAS EAR/PLS OXIMETRY MLT: CPT

## 2020-01-14 PROCEDURE — 36415 COLL VENOUS BLD VENIPUNCTURE: CPT

## 2020-01-14 PROCEDURE — 25000003 PHARM REV CODE 250: Performed by: INTERNAL MEDICINE

## 2020-01-14 RX ADMIN — METHYLPREDNISOLONE SODIUM SUCCINATE 40 MG: 40 INJECTION, POWDER, FOR SOLUTION INTRAMUSCULAR; INTRAVENOUS at 06:01

## 2020-01-14 RX ADMIN — INSULIN ASPART 12 UNITS: 100 INJECTION, SOLUTION INTRAVENOUS; SUBCUTANEOUS at 12:01

## 2020-01-14 RX ADMIN — DIPHENHYDRAMINE HYDROCHLORIDE 25 MG: 50 INJECTION INTRAMUSCULAR; INTRAVENOUS at 06:01

## 2020-01-14 RX ADMIN — INSULIN ASPART 12 UNITS: 100 INJECTION, SOLUTION INTRAVENOUS; SUBCUTANEOUS at 05:01

## 2020-01-14 RX ADMIN — LOSARTAN POTASSIUM 25 MG: 25 TABLET ORAL at 09:01

## 2020-01-14 RX ADMIN — METHYLPREDNISOLONE SODIUM SUCCINATE 40 MG: 40 INJECTION, POWDER, FOR SOLUTION INTRAMUSCULAR; INTRAVENOUS at 09:01

## 2020-01-14 RX ADMIN — INSULIN ASPART 12 UNITS: 100 INJECTION, SOLUTION INTRAVENOUS; SUBCUTANEOUS at 09:01

## 2020-01-14 RX ADMIN — PANTOPRAZOLE SODIUM 40 MG: 40 TABLET, DELAYED RELEASE ORAL at 09:01

## 2020-01-14 RX ADMIN — SENNOSIDES AND DOCUSATE SODIUM 1 TABLET: 8.6; 5 TABLET ORAL at 09:01

## 2020-01-14 RX ADMIN — CARVEDILOL 6.25 MG: 6.25 TABLET, FILM COATED ORAL at 09:01

## 2020-01-14 RX ADMIN — ASPIRIN 325 MG: 325 TABLET, DELAYED RELEASE ORAL at 09:01

## 2020-01-14 RX ADMIN — CLOPIDOGREL BISULFATE 75 MG: 75 TABLET ORAL at 09:01

## 2020-01-14 RX ADMIN — HYDROXYZINE PAMOATE 25 MG: 25 CAPSULE ORAL at 03:01

## 2020-01-14 RX ADMIN — TRIAMCINOLONE ACETONIDE: 1 CREAM TOPICAL at 09:01

## 2020-01-14 RX ADMIN — INSULIN ASPART 3 UNITS: 100 INJECTION, SOLUTION INTRAVENOUS; SUBCUTANEOUS at 05:01

## 2020-01-14 RX ADMIN — INSULIN ASPART 4 UNITS: 100 INJECTION, SOLUTION INTRAVENOUS; SUBCUTANEOUS at 09:01

## 2020-01-14 RX ADMIN — HYDROXYZINE PAMOATE 25 MG: 25 CAPSULE ORAL at 09:01

## 2020-01-14 RX ADMIN — INSULIN ASPART 4 UNITS: 100 INJECTION, SOLUTION INTRAVENOUS; SUBCUTANEOUS at 12:01

## 2020-01-14 RX ADMIN — TRIAMCINOLONE ACETONIDE: 1 CREAM TOPICAL at 12:01

## 2020-01-14 RX ADMIN — METHYLPREDNISOLONE SODIUM SUCCINATE 40 MG: 40 INJECTION, POWDER, FOR SOLUTION INTRAMUSCULAR; INTRAVENOUS at 03:01

## 2020-01-14 RX ADMIN — PRAVASTATIN SODIUM 40 MG: 40 TABLET ORAL at 09:01

## 2020-01-14 NOTE — ASSESSMENT & PLAN NOTE
Lab Results   Component Value Date    HGBA1C 7.4 (H) 01/09/2020   obtain A1c.   SSI and adjust accordingly,  On prandial ,basal,and SSI at thi stime.    Increasing insulin for solumedrol    Well controlled now.  Will have to cut back insulin once steroids stopped     Elevated sugars this am.  SS. Hopefully can wean or cut back steroids soon

## 2020-01-14 NOTE — PT/OT/SLP PROGRESS
"Physical Therapy Treatment    Patient Name:  Ruthann Doe   MRN:  [id number]    Recommendations:     Discharge Recommendations:  24 hour care/supervision 2/2 dementia. Too high level and at/very near baseline level of function for SNF.   Discharge Equipment Recommendations: none   Barriers to discharge: Decreased caregiver support    Assessment:     Ruthann Doe is a 79 y.o. female admitted with a medical diagnosis of Pemphigus vulgaris.  She presents with the following impairments/functional limitations:  weakness, impaired functional mobilty, impaired cognition, decreased safety awareness, gait instability, impaired balance, impaired self care skills, decreased lower extremity function, impaired skin, pain .    Rehab Prognosis: Fair; patient would benefit from acute skilled PT services to address these deficits and reach maximum level of function.    Recent Surgery: * No surgery found *      Plan:     During this hospitalization, patient to be seen 3 x/week to address the identified rehab impairments via gait training, therapeutic activities, therapeutic exercises and progress toward the following goals:    · Plan of Care Expires:  20    Subjective     Chief Complaint: back pain from open blisters. Needs her son in law "paged"  Patient/Family Comments/goals: decrease back pain  Pain/Comfort:  · Pain Rating 1: (anders baker 8, back )  · Location 1: back      Objective:     Communicated with RN prior to session.  Patient found L sidelying with bed alarm upon PT entry to room.     General Precautions: Standard, fall   Orthopedic Precautions:N/A   Braces: N/A     Alert; Oriented to name and   Confused to place, year, month, and situation  Perseverates; needs redirection throughout    Functional Mobility:  · Bed Mobility:     · Supine to Sit: independence  · Transfers:     · Sit to Stand:  independence with no AD  · Gait: c/ RW x 400 ft c/ supervision; gait speed WFL; no LOB or SOB  · Balance: " "good, standing c/ BUE support     Pt amb c/ toilet c/ RW and SBA; distant supervision c/ toilet t/f  Asked pt if she thought she was safe to ambulate without RW ; she replied "no, I still need it"      AM-PAC 6 CLICK MOBILITY  Turning over in bed (including adjusting bedclothes, sheets and blankets)?: 4  Sitting down on and standing up from a chair with arms (e.g., wheelchair, bedside commode, etc.): 4  Moving from lying on back to sitting on the side of the bed?: 4  Moving to and from a bed to a chair (including a wheelchair)?: 4  Need to walk in hospital room?: 4  Climbing 3-5 steps with a railing?: 4  Basic Mobility Total Score: 24       Therapeutic Activities and Exercises:   sup c/ toileting and toilet t/f; assisted pt to change soiled gown    Patient left supine with bed alarm on..    GOALS:   Multidisciplinary Problems     Physical Therapy Goals        Problem: Physical Therapy Goal    Goal Priority Disciplines Outcome Goal Variances Interventions   Physical Therapy Goal     PT, PT/OT Ongoing, Progressing     Description:  Goals to be met by: 2020     Patient will increase functional independence with mobility by performin. Bed to chair transfer with Modified [place]   2. Gait x 400 ft without assistive device and distant supervision.                             Time Tracking:     PT Received On: 20  PT Start Time: 1309     PT Stop Time: 1330  PT Total Time (min): 21 min     Billable Minutes: 15      Treatment Type: Other (see comments)(PT initial eval complete)  PT/PTA: PT     PTA Visit Number: 0     Tia Disla, PT  2020  "

## 2020-01-14 NOTE — PLAN OF CARE
Problem: Physical Therapy Goal  Goal: Physical Therapy Goal  Description  Goals to be met by: 2020     Patient will increase functional independence with mobility by performin. Bed to chair transfer with Modified [place]   2. Gait x 400 ft without assistive device and distant supervision.            Outcome: Ongoing, Progressing    Pt is approaching baseline level of function. Ambulating 400 ft today, steadily, with walker. Confused 2/2 dementia; jail placement is appropriate at this time. Will decreased frequency to 3x/week.

## 2020-01-14 NOTE — PT/OT/SLP PROGRESS
Occupational Therapy   Treatment    Name: Ruthann Doe  MRN: [id number]  Admitting Diagnosis:  Pemphigus vulgaris       Recommendations:     Discharge Recommendations: nursing facility, skilled  Discharge Equipment Recommendations:  walker, rolling, bedside commode  Barriers to discharge:       Assessment:     Ruthann Doe is a 79 y.o. female with a medical diagnosis of Pemphigus vulgaris.  She presents with the following performance deficits affecting function: weakness, impaired endurance, impaired self care skills, impaired cognition, impaired functional mobilty, gait instability, impaired balance, impaired skin, decreased safety awareness. Pt tolerated OT session well today with good participation. She performed toileting, grooming, and functional t/f mobility with supervision/RW today. She does require frequent redirection and reorientation 2* dementia. Pt will benefit from continued OT services to address functional deficits, safety, and to maximize level of functional independence prior to d/c.    Rehab Prognosis:  Good; patient would benefit from acute skilled OT services to address these deficits and reach maximum level of function.       Plan:     Patient to be seen 5 x/week(M-F) to address the above listed problems via therapeutic activities, therapeutic exercises  · Plan of Care Expires: 01/17/20  · Plan of Care Reviewed with: patient    Subjective   Pt agreeable to therapy.    Pain/Comfort:  · Pain Rating 1: 0/10   · Pt only c/o itching from wounds all over skin.     Objective:     Communicated with: Nurse Casiano prior to session.  Patient found left sidelying with bed alarm upon OT entry to room.    General Precautions: Standard, fall   Orthopedic Precautions:N/A   Braces: N/A     Occupational Performance:     Bed Mobility:    · Patient completed Scooting/Bridging with modified independence  · Patient completed Supine to Sit with modified independence     Functional  "Mobility/Transfers:  · Patient completed Sit <> Stand Transfer with supervision  with  rolling walker   · Patient completed Bed <> Chair Transfer using Step Transfer technique with supervision with rolling walker  · Patient completed Toilet Transfer Step Transfer technique with supervision with  rolling walker  · Functional Mobility: Pt able to ambulate within room to bathroom, sink, and chair with S/RW, cues for safety and management of RW. Pt reported feeling " a little wobbly" with ambulation, however, no LOB.    Activities of Daily Living:  · Grooming: supervision standing at sink for all aspects of grooming  · Toileting: supervision  for pericare and clothing mangement      [facility] 6 Click ADL: 20    Treatment & Education:  · Pt educated on OT role/POC.   · Importance of OOB activity with staff assistance  · Importance of sitting up in the chair throughout the day as tolerated, especially for meals   · Safety during functional t/f and mobility with use of RW  · White board updated   · Multiple self-care tasks/functional mobility completed- assistance level noted above   · Pt required occasional redirection and reorientation throughout session 2* dementia  · All questions/concerns answered within OT scope of practice      Patient left reclined up in chair with all lines intact, chair alarm on, nurse Stephenie notified and AVASYS presentEducation:      GOALS:   Multidisciplinary Problems     Occupational Therapy Goals        Problem: Occupational Therapy Goal    Goal Priority Disciplines Outcome Interventions   Occupational Therapy Goal     OT, PT/OT Ongoing, Progressing    Description:  Goals to be met by: 1/17/2020     Patient will increase functional independence with ADLs by performing:    Grooming while standing with Supervision.  Toileting from toilet with Contact Guard Assistance for hygiene and clothing management.   Step transfer with Contact Guard Assistance  Toilet transfer to toilet with Contact Guard " Assistance.  Upper extremity exercise program per handout, with assistance as needed.                      Time Tracking:     OT Date of Treatment: 01/14/20  OT Start Time: 1610  OT Stop Time: 1634  OT Total Time (min): 24 min    Billable Minutes:Self Care/Home Management 24    PUJA Del Cid  1/14/2020

## 2020-01-14 NOTE — ASSESSMENT & PLAN NOTE
"  See physical exam. Patient was seen by Dermatology Dr. Hinton in Sept for similar lesion but not this extensive. Lesion cleared with steroid injections? X 2. Daughter Ayse reports biopsy showed "Dermatitis of unknown origin."   Denies any new antibiotics up untial 12/29 placed on clindamycin for "cellulits" when seen by [facility] [address].   Will hold clindamycin  Consult Dermatology -Called office [phone] at approx 1030 and again 1100 however provider will not available until 1 pm.  Consult wound care to assist.       Dermatology recommended start IV and topical steroids for Pemphigus Vulgaris.   IV solumedrol 30 mg IV BID x 5 days  Triamcinolone 0.1 % cream BID   For punch biopsy today  If no improvement in 5 days, may need to start IVIG.     Assume derm will see patient today?    Continue steroids today.  May d/c tomorrow     Will ask nursing to contact dermatology. I need a plan/recs     "

## 2020-01-14 NOTE — PROGRESS NOTES
[facility] Hospital Medicine  Progress Note    Patient Name: Ruthann Doe  MRN: [id number]  Patient Class: IP- Inpatient   Admission Date: 1/8/2020  Length of Stay: 5 days  Attending Physician: Sammy Perales MD  Primary Care Provider: Jesse Partida MD        Subjective:     Principal Problem:Pemphigus vulgaris        HPI:  Mrs. Doe is a 78 yo female with significant hypertension, hyperlipidemia, diabetes type 2, dementia, PPM/AICD?, CAD status post MI, chronic systolic diastolic heart failure, and pulmonary hypertension who was brought by Daughter Ayse to hospital for worsen dementia, recurrent falls and need for NHP. Patient lives alone. Daughter Ayse have been estranged from patient for over 10 years but stepped in few years ago to assist. Daughter Ayse reports patient dementia with visual /aduitory hallucinations for over 2 years and consisting of dead people and cats for 2 years. Baseline ambulates with walker up until 10 days ago.    Patient also found to have diffuse body lesions consisting for blisters and scab over lesion through out body sparring the buttock and perineum. Patient had similar lesions in Sept however only on torso which cleared up with steroid injections in Sept and October. By end of November, lesions completely resolved. 10 days ago patient was brought to Urgent Care on [address] then told had cellulitis prescribed clindamycin 300 mg TID.    CXR no acute process. CTH  No acute intracranial abnormality. Afebrile and no leukocytosis. UA no evidence of infection.     Overview/Hospital Course:  Mrs. Doe is a 78 yo female with significant hypertension, hyperlipidemia, diabetes type 2, dementia, PPM/AICD?, CAD status post MI, chronic systolic diastolic heart failure, and pulmonary hypertension who was brought by Daughter Ayse to hospital for worsen dementia, recurrent falls and need for NHP. Patient lives alone. Daughter Ayse have been estranged from  patient for over 10 years but stepped in few years ago to assist. Daughter Ayse reports patient dementia with visual /aduitory hallucinations for over 2 years and consisting of dead people and cats for 2 years. Baseline ambulates with walker up until 10 days ago.    Patient also found to have diffuse body lesions consisting for blisters and scab over lesion through out body sparring the buttock and perineum. Patient had similar lesions in Sept however only on torso which cleared up with steroid injections in Sept and October. By end of November, lesions completely resolved. 10 days ago patient was brought to [facility]gent [facility] [address] [facility] then told had cellulitis prescribed clindamycin 300 mg TID.    CXR no acute process. CTH  No acute intracranial abnormality. Afebrile and no leukocytosis. UA no evidence of infection.  PT/OT evaluated the patient and recommended SNF. Patient was found to be in A-fib and cards consulted. No NOAC due to frequent falls and non-compliance.  Dermatology was consulted and diagnosed patient with pemphigus vulgaris.  The patient was started on IV steroids.         Interval History: No new issue     Review of Systems   Constitutional: Negative for activity change.   HENT: Negative for congestion.    Respiratory: Negative for chest tightness and shortness of breath.    Cardiovascular: Negative for chest pain.   Genitourinary: Negative for difficulty urinating.   Musculoskeletal: Negative for arthralgias.   Psychiatric/Behavioral: Negative for agitation.     Objective:     Vital Signs (Most Recent):  Temp: 98.2 °F (36.8 °C) (01/14/20 1141)  Pulse: 77 (01/14/20 1141)  Resp: 17 (01/14/20 1141)  BP: (!) 148/82 (01/14/20 1141)  SpO2: 96 % (01/14/20 1141) Vital Signs (24h Range):  Temp:  [98.2 °F (36.8 °C)-99.4 °F (37.4 °C)] 98.2 °F (36.8 °C)  Pulse:  [] 77  Resp:  [16-18] 17  SpO2:  [95 %-99 %] 96 %  BP: (112-148)/(57-82) 148/82     Weight: 55 kg (121 lb 4.1 oz)  Body mass index is 20.81  "kg/m².  No intake or output data in the 24 hours ending 01/14/20 1159   Physical Exam   Constitutional: She is oriented to person, place, and time. She appears well-developed and well-nourished.   HENT:   Head: Normocephalic and atraumatic.   Neurological: She is alert and oriented to person, place, and time.   Skin: Skin is warm and dry.   Psychiatric: She has a normal mood and affect. Her behavior is normal.   Nursing note and vitals reviewed.      Significant Labs:   BMP:   Recent Labs   Lab 01/14/20  0544   *   *   K 4.4      CO2 22*   BUN 31*   CREATININE 1.2   CALCIUM 8.8     CBC: No results for input(s): WBC, HGB, HCT, PLT in the last 48 hours.    Significant Imaging:      Assessment/Plan:      * Pemphigus vulgaris    See physical exam. Patient was seen by Dermatology Dr. Hinton in Sept for similar lesion but not this extensive. Lesion cleared with steroid injections? X 2. Daughter Ayse reports biopsy showed "Dermatitis of unknown origin."   Denies any new antibiotics up untial 12/29 placed on clindamycin for "cellulits" when seen by Urgent [facility] on [address].   Will hold clindamycin  Consult Dermatology -Called office [phone] at approx 1030 and again 1100 however provider will not available until 1 pm.  Consult wound care to assist.       Dermatology recommended start IV and topical steroids for Pemphigus Vulgaris.   IV solumedrol 30 mg IV BID x 5 days  Triamcinolone 0.1 % cream BID   For punch biopsy today  If no improvement in 5 days, may need to start IVIG.     Assume derm will see patient today?    Continue steroids today.  May d/c tomorrow     Will ask nursing to contact dermatology. I need a plan/recs       Paroxysmal atrial fibrillation  No NOAC per cards. Rate controlled       Chronic combined systolic and diastolic heart failure  EF of 25-30%,Euvolemic. Not on diuretics at home.  Continue metoprolol. Resume ACEI/ARB if renal function stable and BP tolerates.       Pressure injury of left " heel, unstageable  Apply boot. Wound care consult       Recurrent falls  See above #1.     PT/OT rec: SNF.   Likely not a candidate for anti-coagulation due to falls.        Diffuse Skin Breakdown and Lesions  See above      CKD stage 2 due to type 2 diabetes mellitus  Stable. Repeat labs pending.     With some pre-renal failure. Will hold lasix       Anemia, chronic disease  Obtain iron studies, vitamin B 12, folate level      Dementia without behavioral disturbance  Per patient, history of dementia with visual auditory hallucinations x 2 years. Baseline oriented to self and place but not time.   EKG accelerated Junctional 72. Troponin x1  Lactate normal.  UA no infection. CTH no acute abnormality.   Contributing to falls (Dec 4, Jan 3, and yesterday Jaison 8 x 2 ). All the falls have been unwitnessed as patient lives alone.  Currently mental status at baseline  PT/OT consult  Consulted SW for NH placement.      Type 2 diabetes mellitus with hyperglycemia  Lab Results   Component Value Date    HGBA1C 7.4 (H) 01/09/2020   obtain A1c.   SSI and adjust accordingly,  On prandial ,basal,and SSI at [facility].    Increasing insulin for solumedrol    Well controlled now.  Will have to cut back insulin once steroids stopped     Elevated sugars this am.  SS. Hopefully can wean or cut back steroids soon         AICD (automatic cardioverter/defibrillator) present  No current issues.       Essential hypertension  Continue metoprolol. Resume ramipril ? Hydralazine prn.       CAD (coronary artery disease)  Daughter Ayse reports history of MI/stents/AICD/PPM but not sure dates and previous test.   Denies chest pain or shortness of breath  Continue ASA, plavix, metoprolol. Previously on ramipril.        VTE Risk Mitigation (From admission, onward)         Ordered     IP VTE HIGH RISK PATIENT  Once      01/09/20 0815     Place CHANO hose  Until discontinued      01/09/20 0815     Place sequential compression device  Until discontinued       01/09/20 0815              Awaiting derm recs. To SNF in next couple of days.           Sammy Rodrigues MD  Department of Hospital Medicine   [facility]

## 2020-01-14 NOTE — NURSING
Assessment completed. Patient oriented to self and place. Multiple    open areas on body scabs also present. Cream applied. Bed low call light in reach .

## 2020-01-14 NOTE — PLAN OF CARE
Remains free from falls  No complaints of pain  Bed low call light in reach chair alarm activated   Patient reoriented to call nurse for assisstance.

## 2020-01-14 NOTE — PLAN OF CARE
Problem: Fall Injury Risk  Goal: Absence of Fall and Fall-Related Injury  Outcome: Ongoing, Progressing     Problem: Adult Inpatient Plan of Care  Goal: Plan of Care Review  Outcome: Ongoing, Progressing  Goal: Patient-Specific Goal (Individualization)  Outcome: Ongoing, Progressing  Goal: Absence of Hospital-Acquired Illness or Injury  Outcome: Ongoing, Progressing  Goal: Optimal Comfort and Wellbeing  Outcome: Ongoing, Progressing  Goal: Readiness for Transition of Care  Outcome: Ongoing, Progressing  Goal: Rounds/Family Conference  Outcome: Ongoing, Progressing     Problem: Wound  Goal: Optimal Wound Healing  Outcome: Ongoing, Progressing     Problem: Skin Injury Risk Increased  Goal: Skin Health and Integrity  Outcome: Ongoing, Progressing     Problem: Diabetes Comorbidity  Goal: Blood Glucose Level Within Desired Range  Outcome: Ongoing, Progressing     Problem: Infection  Goal: Infection Symptom Resolution  Outcome: Ongoing, Progressing     Problem: Coping Ineffective  Goal: Effective Coping  Outcome: Ongoing, Progressing     Problem: Restraint, Nonbehavioral (Nonviolent)  Goal: Discontinuation Criteria Achieved  Outcome: Ongoing, Progressing  Goal: Personal Dignity and Safety Maintained  Outcome: Ongoing, Progressing

## 2020-01-14 NOTE — PLAN OF CARE
Problem: Occupational Therapy Goal  Goal: Occupational Therapy Goal  Description  Goals to be met by: 1/17/2020     Patient will increase functional independence with ADLs by performing:    Grooming while standing with Supervision. MET  Toileting from toilet with Contact Guard Assistance for hygiene and clothing management. MET  Step transfer with Contact Guard Assistance MET  Toilet transfer to toilet with Contact Guard Assistance. MET  Upper extremity exercise program per handout, with assistance as needed.      Outcome: Ongoing, Progressing   Pt performed toileting, grooming tasks, and functional transfer/mobility with supervision/RW today. Pt progressing, continue OT POC as indicated.

## 2020-01-14 NOTE — SUBJECTIVE & OBJECTIVE
Interval History: No new issue     Review of Systems   Constitutional: Negative for activity change.   HENT: Negative for congestion.    Respiratory: Negative for chest tightness and shortness of breath.    Cardiovascular: Negative for chest pain.   Genitourinary: Negative for difficulty urinating.   Musculoskeletal: Negative for arthralgias.   Psychiatric/Behavioral: Negative for agitation.     Objective:     Vital Signs (Most Recent):  Temp: 98.2 °F (36.8 °C) (01/14/20 1141)  Pulse: 77 (01/14/20 1141)  Resp: 17 (01/14/20 1141)  BP: (!) 148/82 (01/14/20 1141)  SpO2: 96 % (01/14/20 1141) Vital Signs (24h Range):  Temp:  [98.2 °F (36.8 °C)-99.4 °F (37.4 °C)] 98.2 °F (36.8 °C)  Pulse:  [] 77  Resp:  [16-18] 17  SpO2:  [95 %-99 %] 96 %  BP: (112-148)/(57-82) 148/82     Weight: 55 kg (121 lb 4.1 oz)  Body mass index is 20.81 kg/m².  No intake or output data in the 24 hours ending 01/14/20 1159   Physical Exam   Constitutional: She is oriented to person, place, and time. She appears well-developed and well-nourished.   HENT:   Head: Normocephalic and atraumatic.   Neurological: She is alert and oriented to person, place, and time.   Skin: Skin is warm and dry.   Psychiatric: She has a normal mood and affect. Her behavior is normal.   Nursing note and vitals reviewed.      Significant Labs:   BMP:   Recent Labs   Lab 01/14/20  0544   *   *   K 4.4      CO2 22*   BUN 31*   CREATININE 1.2   CALCIUM 8.8     CBC: No results for input(s): WBC, HGB, HCT, PLT in the last 48 hours.    Significant Imaging:

## 2020-01-15 LAB
ANION GAP SERPL CALC-SCNC: 10 MMOL/L (ref 8–16)
BUN SERPL-MCNC: 34 MG/DL (ref 8–23)
CALCIUM SERPL-MCNC: 8.8 MG/DL (ref 8.7–10.5)
CHLORIDE SERPL-SCNC: 106 MMOL/L (ref 95–110)
CO2 SERPL-SCNC: 22 MMOL/L (ref 23–29)
CREAT SERPL-MCNC: 1.2 MG/DL (ref 0.5–1.4)
EST. GFR  (AFRICAN AMERICAN): 50 ML/MIN/1.73 M^2
EST. GFR  (NON AFRICAN AMERICAN): 43 ML/MIN/1.73 M^2
GLUCOSE SERPL-MCNC: 299 MG/DL (ref 70–110)
POCT GLUCOSE: 143 MG/DL (ref 70–110)
POCT GLUCOSE: 163 MG/DL (ref 70–110)
POCT GLUCOSE: 367 MG/DL (ref 70–110)
POTASSIUM SERPL-SCNC: 4.6 MMOL/L (ref 3.5–5.1)
SODIUM SERPL-SCNC: 138 MMOL/L (ref 136–145)

## 2020-01-15 PROCEDURE — 25000003 PHARM REV CODE 250: Performed by: NURSE PRACTITIONER

## 2020-01-15 PROCEDURE — 63600175 PHARM REV CODE 636 W HCPCS: Performed by: INTERNAL MEDICINE

## 2020-01-15 PROCEDURE — 25000003 PHARM REV CODE 250: Performed by: EMERGENCY MEDICINE

## 2020-01-15 PROCEDURE — 94761 N-INVAS EAR/PLS OXIMETRY MLT: CPT

## 2020-01-15 PROCEDURE — 80048 BASIC METABOLIC PNL TOTAL CA: CPT

## 2020-01-15 PROCEDURE — 25000003 PHARM REV CODE 250: Performed by: INTERNAL MEDICINE

## 2020-01-15 PROCEDURE — 25000003 PHARM REV CODE 250: Performed by: HOSPITALIST

## 2020-01-15 PROCEDURE — 21400001 HC TELEMETRY ROOM

## 2020-01-15 PROCEDURE — 36415 COLL VENOUS BLD VENIPUNCTURE: CPT

## 2020-01-15 RX ORDER — PREDNISONE 20 MG/1
60 TABLET ORAL DAILY
Status: DISCONTINUED | OUTPATIENT
Start: 2020-01-15 | End: 2020-01-16

## 2020-01-15 RX ORDER — SODIUM CHLORIDE 9 MG/ML
INJECTION, SOLUTION INTRAVENOUS CONTINUOUS
Status: DISCONTINUED | OUTPATIENT
Start: 2020-01-15 | End: 2020-01-16

## 2020-01-15 RX ADMIN — SENNOSIDES AND DOCUSATE SODIUM 1 TABLET: 8.6; 5 TABLET ORAL at 09:01

## 2020-01-15 RX ADMIN — LOSARTAN POTASSIUM 25 MG: 25 TABLET ORAL at 09:01

## 2020-01-15 RX ADMIN — CARVEDILOL 6.25 MG: 6.25 TABLET, FILM COATED ORAL at 09:01

## 2020-01-15 RX ADMIN — TRIAMCINOLONE ACETONIDE: 1 CREAM TOPICAL at 09:01

## 2020-01-15 RX ADMIN — PRAVASTATIN SODIUM 40 MG: 40 TABLET ORAL at 09:01

## 2020-01-15 RX ADMIN — INSULIN ASPART 12 UNITS: 100 INJECTION, SOLUTION INTRAVENOUS; SUBCUTANEOUS at 09:01

## 2020-01-15 RX ADMIN — INSULIN ASPART 12 UNITS: 100 INJECTION, SOLUTION INTRAVENOUS; SUBCUTANEOUS at 12:01

## 2020-01-15 RX ADMIN — SODIUM CHLORIDE: 0.9 INJECTION, SOLUTION INTRAVENOUS at 02:01

## 2020-01-15 RX ADMIN — ASPIRIN 325 MG: 325 TABLET, DELAYED RELEASE ORAL at 09:01

## 2020-01-15 RX ADMIN — CLOPIDOGREL BISULFATE 75 MG: 75 TABLET ORAL at 09:01

## 2020-01-15 RX ADMIN — PREDNISONE 60 MG: 20 TABLET ORAL at 02:01

## 2020-01-15 RX ADMIN — PANTOPRAZOLE SODIUM 40 MG: 40 TABLET, DELAYED RELEASE ORAL at 09:01

## 2020-01-15 RX ADMIN — METHYLPREDNISOLONE SODIUM SUCCINATE 40 MG: 40 INJECTION, POWDER, FOR SOLUTION INTRAMUSCULAR; INTRAVENOUS at 05:01

## 2020-01-15 RX ADMIN — INSULIN ASPART 12 UNITS: 100 INJECTION, SOLUTION INTRAVENOUS; SUBCUTANEOUS at 05:01

## 2020-01-15 NOTE — SUBJECTIVE & OBJECTIVE
Interval History: No new issues     Review of Systems   Constitutional: Negative for activity change.   HENT: Negative for congestion.    Respiratory: Negative for chest tightness and shortness of breath.    Cardiovascular: Negative for chest pain.   Genitourinary: Negative for difficulty urinating.   Musculoskeletal: Negative for arthralgias.   Psychiatric/Behavioral: Negative for agitation.     Objective:     Vital Signs (Most Recent):  Temp: 97.8 °F (36.6 °C) (01/15/20 1122)  Pulse: 78 (01/15/20 1122)  Resp: 18 (01/15/20 1122)  BP: (!) 115/55 (01/15/20 1122)  SpO2: 96 % (01/15/20 1122) Vital Signs (24h Range):  Temp:  [97.4 °F (36.3 °C)-98.5 °F (36.9 °C)] 97.8 °F (36.6 °C)  Pulse:  [78-92] 78  Resp:  [16-18] 18  SpO2:  [95 %-99 %] 96 %  BP: (115-132)/(55-69) 115/55     Weight: 58.8 kg (129 lb 10.1 oz)  Body mass index is 22.25 kg/m².    Intake/Output Summary (Last 24 hours) at 1/15/2020 1239  Last data filed at 1/15/2020 0810  Gross per 24 hour   Intake 240 ml   Output --   Net 240 ml      Physical Exam   Constitutional: She is oriented to person, place, and time. She appears well-developed and well-nourished.   HENT:   Head: Normocephalic and atraumatic.   Neurological: She is alert and oriented to person, place, and time.   Skin: Skin is warm and dry.   Psychiatric: She has a normal mood and affect. Her behavior is normal.   Nursing note and vitals reviewed.      Significant Labs:   BMP:   Recent Labs   Lab 01/15/20  0401   *      K 4.6      CO2 22*   BUN 34*   CREATININE 1.2   CALCIUM 8.8     CBC: No results for input(s): WBC, HGB, HCT, PLT in the last 48 hours.    Significant Imaging:

## 2020-01-15 NOTE — ASSESSMENT & PLAN NOTE
"  See physical exam. Patient was seen by Dermatology Dr. Hinton in Sept for similar lesion but not this extensive. Lesion cleared with steroid injections? X 2. Daughter Ayse reports biopsy showed "Dermatitis of unknown origin."   Denies any new antibiotics up untial 12/29 placed on clindamycin for "cellulits" when seen by [facility] [address]   Will hold clindamycin  Consult Dermatology -Called office [phone] at approx 1030 and again 1100 however provider will not available until 1 pm.  Consult wound care to assist.       Dermatology recommended start IV and topical steroids for Pemphigus Vulgaris.   IV solumedrol 30 mg IV BID x 5 days  Triamcinolone 0.1 % cream BID   For punch biopsy today  If no improvement in 5 days, may need to start IVIG.     Assume derm will see patient today?    Continue steroids today.  May d/c tomorrow     Will ask nursing to contact dermatology. I need a plan/recs     "

## 2020-01-15 NOTE — PROGRESS NOTES
[facility] Hospital Medicine  Progress Note    Patient Name: Ruthann Doe  MRN: [id number]  Patient Class: IP- Inpatient   Admission Date: 1/8/2020  Length of Stay: 6 days  Attending Physician: Sammy Perales MD  Primary Care Provider: Jesse Partida MD        Subjective:     Principal Problem:Pemphigus vulgaris        HPI:  Mrs. Doe is a 78 yo female with significant hypertension, hyperlipidemia, diabetes type 2, dementia, PPM/AICD?, CAD status post MI, chronic systolic diastolic heart failure, and pulmonary hypertension who was brought by Daughter Ayse to hospital for worsen dementia, recurrent falls and need for NHP. Patient lives alone. Daughter Ayse have been estranged from patient for over 10 years but stepped in few years ago to assist. Daughter Ayse reports patient dementia with visual /aduitory hallucinations for over 2 years and consisting of dead people and cats for 2 years. Baseline ambulates with walker up until 10 days ago.    Patient also found to have diffuse body lesions consisting for blisters and scab over lesion through out body sparring the buttock and perineum. Patient had similar lesions in Sept however only on torso which cleared up with steroid injections in Sept and October. By end of November, lesions completely resolved. 10 days ago patient was brought to Urgent Care on [address] then told had cellulitis prescribed clindamycin 300 mg TID.    CXR no acute process. CTH  No acute intracranial abnormality. Afebrile and no leukocytosis. UA no evidence of infection.     Overview/Hospital Course:  Mrs. Doe is a 78 yo female with significant hypertension, hyperlipidemia, diabetes type 2, dementia, PPM/AICD?, CAD status post MI, chronic systolic diastolic heart failure, and pulmonary hypertension who was brought by Daughter Ayse to hospital for worsen dementia, recurrent falls and need for NHP. Patient lives alone. Daughter Ayse have been estranged from  patient for over 10 years but stepped in few years ago to assist. Daughter Ayse reports patient dementia with visual /aduitory hallucinations for over 2 years and consisting of dead people and cats for 2 years. Baseline ambulates with walker up until 10 days ago.    Patient also found to have diffuse body lesions consisting for blisters and scab over lesion through out body sparring the buttock and perineum. Patient had similar lesions in Sept however only on torso which cleared up with steroid injections in Sept and October. By end of November, lesions completely resolved. 10 days ago patient was brought to [facility] [address] [facility] then told had cellulitis prescribed clindamycin 300 mg TID.    CXR no acute process. CTH  No acute intracranial abnormality. Afebrile and no leukocytosis. UA no evidence of infection.  PT/OT evaluated the patient and recommended SNF. Patient was found to be in A-fib and cards consulted. No NOAC due to frequent falls and non-compliance.  Dermatology was consulted and diagnosed patient with pemphigus vulgaris.  The patient was started on IV steroids.         Interval History: No new issues     Review of Systems   Constitutional: Negative for activity change.   HENT: Negative for congestion.    Respiratory: Negative for chest tightness and shortness of breath.    Cardiovascular: Negative for chest pain.   Genitourinary: Negative for difficulty urinating.   Musculoskeletal: Negative for arthralgias.   Psychiatric/Behavioral: Negative for agitation.     Objective:     Vital Signs (Most Recent):  Temp: 97.8 °F (36.6 °C) (01/15/20 1122)  Pulse: 78 (01/15/20 1122)  Resp: 18 (01/15/20 1122)  BP: (!) 115/55 (01/15/20 1122)  SpO2: 96 % (01/15/20 1122) Vital Signs (24h Range):  Temp:  [97.4 °F (36.3 °C)-98.5 °F (36.9 °C)] 97.8 °F (36.6 °C)  Pulse:  [78-92] 78  Resp:  [16-18] 18  SpO2:  [95 %-99 %] 96 %  BP: (115-132)/(55-69) 115/55     Weight: 58.8 kg (129 lb 10.1 oz)  Body mass index is 22.25  "kg/m².    Intake/Output Summary (Last 24 hours) at 1/15/2020 1239  Last data filed at 1/15/2020 0810  Gross per 24 hour   Intake 240 ml   Output --   Net 240 ml      Physical Exam   Constitutional: She is oriented to person, place, and time. She appears well-developed and well-nourished.   HENT:   Head: Normocephalic and atraumatic.   Neurological: She is alert and oriented to person, place, and time.   Skin: Skin is warm and dry.   Psychiatric: She has a normal mood and affect. Her behavior is normal.   Nursing note and vitals reviewed.      Significant Labs:   BMP:   Recent Labs   Lab 01/15/20  0401   *      K 4.6      CO2 22*   BUN 34*   CREATININE 1.2   CALCIUM 8.8     CBC: No results for input(s): WBC, HGB, HCT, PLT in the last 48 hours.    Significant Imaging:      Assessment/Plan:      * Pemphigus vulgaris    See physical exam. Patient was seen by Dermatology Dr. Hinton in Sept for similar lesion but not this extensive. Lesion cleared with steroid injections? X 2. Daughter Ayse reports biopsy showed "Dermatitis of unknown origin."   Denies any new antibiotics up untial 12/29 placed on clindamycin for "cellulits" when seen by [facility] [address].   Will hold clindamycin  Consult Dermatology -Called office [phone] at approx 1030 and again 1100 however provider will not available until 1 pm.  Consult wound care to assist.       Dermatology recommended start IV and topical steroids for Pemphigus Vulgaris.   IV solumedrol 30 mg IV BID x 5 days  Triamcinolone 0.1 % cream BID   For punch biopsy today  If no improvement in 5 days, may need to start IVIG.     Assume derm will see patient today?    Continue steroids today.  May d/c tomorrow     Will ask nursing to contact dermatology. I need a plan/recs       Paroxysmal atrial fibrillation  No NOAC per cards. Rate controlled       Chronic combined systolic and diastolic heart failure  EF of 25-30%,Euvolemic. Not on diuretics at home.  Continue " metoprolol. Resume ACEI/ARB if renal function stable and BP tolerates.       Pressure injury of left heel, unstageable  Apply boot. Wound care consult       Recurrent falls  See above #1.     PT/OT rec: SNF.   Likely not a candidate for anti-coagulation due to falls.        Diffuse Skin Breakdown and Lesions  See above      CKD stage 2 due to type 2 diabetes mellitus  Stable. Repeat labs pending.     With some pre-renal failure. Will hold lasix       Anemia, chronic disease  Obtain iron studies, vitamin B 12, folate level      Dementia without behavioral disturbance  Per patient, history of dementia with visual auditory hallucinations x 2 years. Baseline oriented to self and place but not time.   EKG accelerated Junctional 72. Troponin x1  Lactate normal.  UA no infection. CTH no acute abnormality.   Contributing to falls (Dec 4, Jaison 3, and yesterday Jaison 8 x 2 ). All the falls have been unwitnessed as patient lives alone.  Currently mental status at baseline  PT/OT consult  Consulted  for NH placement.      Type 2 diabetes mellitus with hyperglycemia  Lab Results   Component Value Date    HGBA1C 7.4 (H) 01/09/2020   obtain A1c.   SSI and adjust accordingly,  On prandial ,basal,and SSI at [facility] stim.    Increasing insulin for solumedrol    Well controlled now.  Will have to cut back insulin once steroids stopped     Elevated sugars this am.  SS. Hopefully can wean or cut back steroids soon         AICD (automatic cardioverter/defibrillator) present  No current issues.       Essential hypertension  Continue metoprolol. Resume ramipril ? Hydralazine prn.       CAD (coronary artery disease)  Daughter Ayse reports history of MI/stents/AICD/PPM but not sure dates and previous test.   Denies chest pain or shortness of breath  Continue ASA, plavix, metoprolol. Previously on ramipril.        VTE Risk Mitigation (From admission, onward)         Ordered     IP VTE HIGH RISK PATIENT  Once      01/09/20 0815     Place CHANO hose   Until discontinued      01/09/20 0815     Place sequential compression device  Until discontinued      01/09/20 0815                Need derm recs.  Will ask nursing again to contact derm.     2:57pm  Addendum  Spoke with derm PA. Bx results should be back tomorrow and I will be notified of results. Benjamín agrees with quick steroid taper and follow up with them after discharge.       Sammy Rodrigues MD  Department of Hospital Medicine   [facility]

## 2020-01-15 NOTE — PLAN OF CARE
01/15/20 1445   Discharge Reassessment   Assessment Type Discharge Planning Reassessment   Provided patient/caregiver education on the expected discharge date and the discharge plan Yes   Per Eugenio Paredes patient's family completed paperwork for admission.  However, if patient will require IVIG they will not be able to accept.  Per MDT's this am Dr Perales will get clarification on IVIG.  DC planning on going.

## 2020-01-16 LAB
ANION GAP SERPL CALC-SCNC: 11 MMOL/L (ref 8–16)
BUN SERPL-MCNC: 34 MG/DL (ref 8–23)
CALCIUM SERPL-MCNC: 8.3 MG/DL (ref 8.7–10.5)
CHLORIDE SERPL-SCNC: 111 MMOL/L (ref 95–110)
CO2 SERPL-SCNC: 17 MMOL/L (ref 23–29)
CREAT SERPL-MCNC: 1 MG/DL (ref 0.5–1.4)
EST. GFR  (AFRICAN AMERICAN): >60 ML/MIN/1.73 M^2
EST. GFR  (NON AFRICAN AMERICAN): 54 ML/MIN/1.73 M^2
GLUCOSE SERPL-MCNC: 133 MG/DL (ref 70–110)
POCT GLUCOSE: 103 MG/DL (ref 70–110)
POCT GLUCOSE: 181 MG/DL (ref 70–110)
POCT GLUCOSE: 214 MG/DL (ref 70–110)
POCT GLUCOSE: 231 MG/DL (ref 70–110)
POCT GLUCOSE: 282 MG/DL (ref 70–110)
POTASSIUM SERPL-SCNC: 4.3 MMOL/L (ref 3.5–5.1)
SODIUM SERPL-SCNC: 139 MMOL/L (ref 136–145)

## 2020-01-16 PROCEDURE — 25000003 PHARM REV CODE 250: Performed by: NURSE PRACTITIONER

## 2020-01-16 PROCEDURE — 97110 THERAPEUTIC EXERCISES: CPT | Mod: CO

## 2020-01-16 PROCEDURE — 80048 BASIC METABOLIC PNL TOTAL CA: CPT

## 2020-01-16 PROCEDURE — 63600175 PHARM REV CODE 636 W HCPCS: Performed by: INTERNAL MEDICINE

## 2020-01-16 PROCEDURE — 97116 GAIT TRAINING THERAPY: CPT

## 2020-01-16 PROCEDURE — 36415 COLL VENOUS BLD VENIPUNCTURE: CPT

## 2020-01-16 PROCEDURE — 25000003 PHARM REV CODE 250: Performed by: HOSPITALIST

## 2020-01-16 PROCEDURE — 25000003 PHARM REV CODE 250: Performed by: INTERNAL MEDICINE

## 2020-01-16 PROCEDURE — 21400001 HC TELEMETRY ROOM

## 2020-01-16 PROCEDURE — 25000003 PHARM REV CODE 250: Performed by: EMERGENCY MEDICINE

## 2020-01-16 PROCEDURE — 97535 SELF CARE MNGMENT TRAINING: CPT | Mod: CO

## 2020-01-16 RX ORDER — PREDNISONE 20 MG/1
40 TABLET ORAL DAILY
Status: DISCONTINUED | OUTPATIENT
Start: 2020-01-17 | End: 2020-01-17

## 2020-01-16 RX ADMIN — SODIUM CHLORIDE: 0.9 INJECTION, SOLUTION INTRAVENOUS at 03:01

## 2020-01-16 RX ADMIN — ASPIRIN 325 MG: 325 TABLET, DELAYED RELEASE ORAL at 10:01

## 2020-01-16 RX ADMIN — CARVEDILOL 6.25 MG: 6.25 TABLET, FILM COATED ORAL at 08:01

## 2020-01-16 RX ADMIN — INSULIN ASPART 1 UNITS: 100 INJECTION, SOLUTION INTRAVENOUS; SUBCUTANEOUS at 08:01

## 2020-01-16 RX ADMIN — TRIAMCINOLONE ACETONIDE: 1 CREAM TOPICAL at 08:01

## 2020-01-16 RX ADMIN — TRIAMCINOLONE ACETONIDE: 1 CREAM TOPICAL at 10:01

## 2020-01-16 RX ADMIN — SENNOSIDES AND DOCUSATE SODIUM 1 TABLET: 8.6; 5 TABLET ORAL at 10:01

## 2020-01-16 RX ADMIN — INSULIN ASPART 12 UNITS: 100 INJECTION, SOLUTION INTRAVENOUS; SUBCUTANEOUS at 12:01

## 2020-01-16 RX ADMIN — PRAVASTATIN SODIUM 40 MG: 40 TABLET ORAL at 08:01

## 2020-01-16 RX ADMIN — SENNOSIDES AND DOCUSATE SODIUM 1 TABLET: 8.6; 5 TABLET ORAL at 08:01

## 2020-01-16 RX ADMIN — LOSARTAN POTASSIUM 25 MG: 25 TABLET ORAL at 10:01

## 2020-01-16 RX ADMIN — INSULIN ASPART 12 UNITS: 100 INJECTION, SOLUTION INTRAVENOUS; SUBCUTANEOUS at 05:01

## 2020-01-16 RX ADMIN — PANTOPRAZOLE SODIUM 40 MG: 40 TABLET, DELAYED RELEASE ORAL at 10:01

## 2020-01-16 RX ADMIN — CLOPIDOGREL BISULFATE 75 MG: 75 TABLET ORAL at 10:01

## 2020-01-16 RX ADMIN — CARVEDILOL 6.25 MG: 6.25 TABLET, FILM COATED ORAL at 10:01

## 2020-01-16 NOTE — PROGRESS NOTES
TN spoke with son-in-law, Dav Knapp [phone] at bedside.  Mr Knapp explained that his wife is the patient's last surviving child but is unable to assist in DC planning therefore he is the point of contact.   Mr Knapp stated that he and his wife request [facility] for NH placement and are prepared to pay out of pocket.  TN called Kenyetta at [facility] and left message.  New referral sent via right care.  Awaiting response.    SHANTELL Martinez[facility]ACM; [facility]  [phone]

## 2020-01-16 NOTE — PHYSICIAN QUERY
PT Name: Ruthann Doe  MR #: [id number]    Physician Query Form - CardioPulmonary Clarification      CDS: Valerie Vann RN  Contact information: [email or web address]    This form is a permanent document in the medical record.    Query Date: January 16, 2020  By submitting this query, we are merely seeking further clarification of documentation. Please utilize your independent clinical judgment when addressing the question(s) below.    The Medical record contains the following:   Indicators   Supporting Clinical Findings Location in Medical Record   x Pulmonary Hypertension documented HPI: Mrs. Doe is a 80 yo female with significant hypertension, hyperlipidemia, diabetes type 2, dementia, PPM/AICD?, CAD status post MI, chronic systolic diastolic heart failure, and pulmonary hypertension who was brought by Daughter Ayse to hospital for worsen dementia, recurrent falls and need for NHP H&P 1/9   x Acute/Chronic Illness Reviewed the tele monitoring and she is found to be in atrial fibrillation with rate control.  And her other cardiac history significant for severe cardiomyopathy and coronary artery disease.      Chronic combined systolic and diastolic heart failure  EF of 25-30%,Euvolemic. Not on diuretics at home. Cardiology consult 1/11            HM PN 1/13   x Echo and/or Heart Cath Findings ·Mildly decreased left ventricular systolic function. The estimated ejection fraction is 45%.  ·Moderate left atrial enlargement.  ·Atrial fibrillation observed.  ·Normal right ventricular systolic function.  ·Mild right atrial enlargement.  ·Mild-to-moderate mitral regurgitation.  ·Moderate tricuspid regurgitation.  ·Normal central venous pressure (3 mmHg).  ·The estimated PA systolic pressure is 37 mmHg. Echo 1/12    BiPAP/Intubation/Supplemental O2      SOB, MENDOSA, Fatigue, Dizziness, LE Edema, Cyanosis, Chest Pain, Respiratory Distress, Hypoxia, etc.     x Treatment         Medication Continue metoprolol. Resume  ACEI/ARB if renal function stable and BP tolerates.  PN 1/13    Other       Provider, please specify the type of pulmonary hypertension:    [   ] Group 2:  Pulmonary Hypertension due to Left Heart Disease, including left heart failure and/or left heart valve disease     [   ] Group 5:  Pulmonary Hypertension due to other, multifactorial, or unclear mechanisms     [  x ] Pulmonary Hypertension, unspecified     [   ] Other Cardiopulmonary Condition (please specify):     [  ] Clinically Undetermined         Please document in your progress notes daily for the duration of treatment, until resolved, and include in your discharge summary.

## 2020-01-16 NOTE — NURSING
Midline leaking and not good anymore, Dr. Perales notified, okay'd to leave IV out and DC IV fluids.

## 2020-01-16 NOTE — NURSING
Bedside report received from SHANTELL Garcia. Patient is resting with visible rise and fall of chest. Patient appears to be in no apparent distress. Safety maintained. [employer] at bedside, in progress. Will continue to monitor.

## 2020-01-16 NOTE — PROGRESS NOTES
[facility] Hospital Medicine  Progress Note    Patient Name: Ruthann Doe  MRN: [id number]  Patient Class: IP- Inpatient   Admission Date: 1/8/2020  Length of Stay: 7 days  Attending Physician: Sammy Perales MD  Primary Care Provider: Jesse Partida MD        Subjective:     Principal Problem:Pemphigus vulgaris        HPI:  Mrs. Doe is a 78 yo female with significant hypertension, hyperlipidemia, diabetes type 2, dementia, PPM/AICD?, CAD status post MI, chronic systolic diastolic heart failure, and pulmonary hypertension who was brought by Daughter Ayse to hospital for worsen dementia, recurrent falls and need for NHP. Patient lives alone. Daughter Ayse have been estranged from patient for over 10 years but stepped in few years ago to assist. Daughter Ayse reports patient dementia with visual /aduitory hallucinations for over 2 years and consisting of dead people and cats for 2 years. Baseline ambulates with walker up until 10 days ago.    Patient also found to have diffuse body lesions consisting for blisters and scab over lesion through out body sparring the buttock and perineum. Patient had similar lesions in Sept however only on torso which cleared up with steroid injections in Sept and October. By end of November, lesions completely resolved. 10 days ago patient was brought to [facility] on [address] then told had cellulitis prescribed clindamycin 300 mg TID.    CXR no acute process. CTH  No acute intracranial abnormality. Afebrile and no leukocytosis. UA no evidence of infection.     Overview/Hospital Course:  Mrs. Doe is a 78 yo female with significant hypertension, hyperlipidemia, diabetes type 2, dementia, PPM/AICD?, CAD status post MI, chronic systolic diastolic heart failure, and pulmonary hypertension who was brought by Daughter Ayse to hospital for worsen dementia, recurrent falls and need for NHP. Patient lives alone. Daughter Ayse have been estranged from  patient for over 10 years but stepped in few years ago to assist. Daughter Ayse reports patient dementia with visual /aduitory hallucinations for over 2 years and consisting of dead people and cats for 2 years. Baseline ambulates with walker up until 10 days ago.    Patient also found to have diffuse body lesions consisting for blisters and scab over lesion through out body sparring the buttock and perineum. Patient had similar lesions in Sept however only on torso which cleared up with steroid injections in Sept and October. By end of November, lesions completely resolved. 10 days ago patient was brought to [facility] then told had cellulitis prescribed clindamycin 300 mg TID.    CXR no acute process. CTH  No acute intracranial abnormality. Afebrile and no leukocytosis. UA no evidence of infection.  PT/OT evaluated the patient and recommended SNF. Patient was found to be in A-fib and cards consulted. No NOAC due to frequent falls and non-compliance.  Dermatology was consulted and diagnosed patient with pemphigus vulgaris.  The patient was started on IV steroids and completed a course of 5 days.        Interval History: No new issues.     Review of Systems   Constitutional: Negative for activity change.   HENT: Negative for congestion.    Respiratory: Negative for chest tightness and shortness of breath.    Cardiovascular: Negative for chest pain.   Genitourinary: Negative for difficulty urinating.   Musculoskeletal: Negative for arthralgias.   Psychiatric/Behavioral: Negative for agitation.     Objective:     Vital Signs (Most Recent):  Temp: 97.7 °F (36.5 °C) (01/16/20 0725)  Pulse: 67 (01/16/20 0725)  Resp: 17 (01/16/20 0725)  BP: 127/60 (01/16/20 0725)  SpO2: 100 % (01/16/20 0725) Vital Signs (24h Range):  Temp:  [97 °F (36.1 °C)-98.5 °F (36.9 °C)] 97.7 °F (36.5 °C)  Pulse:  [67-86] 67  Resp:  [17-20] 17  SpO2:  [96 %-100 %] 100 %  BP: (108-138)/(54-64) 127/60     Weight: 58.8 kg (129 lb 10.1  "oz)  Body mass index is 22.25 kg/m².    Intake/Output Summary (Last 24 hours) at 1/16/2020 1055  Last data filed at 1/16/2020 0600  Gross per 24 hour   Intake 0 ml   Output --   Net 0 ml      Physical Exam   Constitutional: She is oriented to person, place, and time. She appears well-developed and well-nourished.   HENT:   Head: Normocephalic and atraumatic.   Neurological: She is alert and oriented to person, place, and time.   Skin: Skin is warm and dry.   Psychiatric: She has a normal mood and affect. Her behavior is normal.   Nursing note and vitals reviewed.      Significant Labs:   BMP:   Recent Labs   Lab 01/16/20  0415   *      K 4.3   *   CO2 17*   BUN 34*   CREATININE 1.0   CALCIUM 8.3*     CBC: No results for input(s): WBC, HGB, HCT, PLT in the last 48 hours.    Significant Imaging:      Assessment/Plan:      * Pemphigus vulgaris    See physical exam. Patient was seen by Dermatology Dr. Hinton in Sept for similar lesion but not this extensive. Lesion cleared with steroid injections? X 2. Daughter Ayse reports biopsy showed "Dermatitis of unknown origin."   Denies any new antibiotics up untial 12/29 placed on clindamycin for "cellulits" when seen by [facility] [address].   Will hold clindamycin  Consult Dermatology -Called office [phone] at approx 1030 and again 1100 however provider will not available until 1 pm.  Consult wound care to assist.       Dermatology recommended start IV and topical steroids for Pemphigus Vulgaris.   IV solumedrol 30 mg IV BID x 5 days  Triamcinolone 0.1 % cream BID   For punch biopsy today  If no improvement in 5 days, may need to start IVIG.     Assume derm will see patient today?    Continue steroids today.  May d/c tomorrow     Will ask nursing to contact dermatology. I need a plan/recs     Spoke with Derm on 1/15- awaiting confirmation test of this diagnosis- they said they would have to day and text me results.       Paroxysmal atrial fibrillation  No " NOAC per cards. Rate controlled       Chronic combined systolic and diastolic heart failure  EF of 25-30%,Euvolemic. Not on diuretics at home.  Continue metoprolol. Resume ACEI/ARB if renal function stable and BP tolerates.       Pressure injury of left heel, unstageable  Apply boot. Wound care consult       Recurrent falls  See above #1.     PT/OT rec: SNF.   Likely not a candidate for anti-coagulation due to falls.        Diffuse Skin Breakdown and Lesions  See above      CKD stage 2 due to type 2 diabetes mellitus  Stable. Repeat labs pending.     With some pre-renal failure. Will hold lasix       Anemia, chronic disease  Obtain iron studies, vitamin B 12, folate level      Dementia without behavioral disturbance  Per patient, history of dementia with visual auditory hallucinations x 2 years. Baseline oriented to self and place but not time.   EKG accelerated Junctional 72. Troponin x1  Lactate normal.  UA no infection. CTH no acute abnormality.   Contributing to falls (Dec 4, Jaison 3, and yesterday Jaison 8 x 2 ). All the falls have been unwitnessed as patient lives alone.  Currently mental status at baseline  PT/OT consult  Consulted  for NH placement.      Type 2 diabetes mellitus with hyperglycemia  Lab Results   Component Value Date    HGBA1C 7.4 (H) 01/09/2020   obtain A1c.   SSI and adjust accordingly,  On prandial ,basal,and SSI at [facility].    Increasing insulin for solumedrol    Well controlled now.  Will have to cut back insulin once steroids stopped     Elevated sugars this am.  SS. Hopefully can wean or cut back steroids soon         AICD (automatic cardioverter/defibrillator) present  No current issues.       Essential hypertension  Continue metoprolol. Resume ramipril ? Hydralazine prn.       CAD (coronary artery disease)  Daughter Ayse reports history of MI/stents/AICD/PPM but not sure dates and previous test.   Denies chest pain or shortness of breath  Continue ASA, plavix, metoprolol. Previously on  ramipril.        VTE Risk Mitigation (From admission, onward)         Ordered     IP VTE HIGH RISK PATIENT  Once      01/09/20 0815     Place CHANO hose  Until discontinued      01/09/20 0815     Place sequential compression device  Until discontinued      01/09/20 0815              Awaiting bx results- hopefully will have today then D/C to [facility]- today or tomorrow.         Sammy Rodrigues MD  Department of Hospital Medicine   [facility]

## 2020-01-16 NOTE — PLAN OF CARE
Problem: Occupational Therapy Goal  Goal: Occupational Therapy Goal  Description  Goals to be met by: 1/17/2020     Patient will increase functional independence with ADLs by performing:    Grooming while standing with Supervision. MET  Toileting from toilet with Contact Guard Assistance for hygiene and clothing management. MET  Step transfer with Contact Guard Assistance MET  Toilet transfer to toilet with Contact Guard Assistance. MET  Upper extremity exercise program per handout, with assistance as needed. MET       Outcome: Ongoing, Progressing   Pt progressing well toward OT goals. Continue OT POC as indicated.

## 2020-01-16 NOTE — PLAN OF CARE
Problem: Physical Therapy Goal  Goal: Physical Therapy Goal  Description  Goals to be met by: 2020     Patient will increase functional independence with mobility by performin. Bed to chair transfer with Modified Wisdom   2. Gait x 400 ft without assistive device and distant supervision.            Outcome: Met    No further PT needs. Recommend pt ambulate with unit staff supervision in halls BID. No need for RW; balance improved. Will sign off.

## 2020-01-16 NOTE — PROGRESS NOTES
Received report from SHANTELL Mulligan. Patient awake & alert resting quietly in bed, telemetry monitoring in place, no distress noted. Safety maintained-telesitter @ bedside, call light in reach.    Satisfactory

## 2020-01-16 NOTE — PT/OT/SLP PROGRESS
Occupational Therapy   Treatment    Name: Ruthann Doe  MRN: [id number]  Admitting Diagnosis:  Pemphigus vulgaris       Recommendations:     Discharge Recommendations: nursing facility, skilled  Discharge Equipment Recommendations:  walker, rolling, bedside commode  Barriers to discharge:       Assessment:     Ruthann Doe is a 79 y.o. female with a medical diagnosis of Pemphigus vulgaris.  She presents with the following performance deficits affecting function: weakness, impaired endurance, impaired self care skills, gait instability, impaired functional mobilty, impaired skin, impaired cognition, decreased safety awareness, impaired balance, decreased ROM. Pt tolerated OT session well     Rehab Prognosis:  Good; patient would benefit from acute skilled OT services to address these deficits and reach maximum level of function.       Plan:     Patient to be seen 5 x/week(M-F) to address the above listed problems via therapeutic activities, therapeutic exercises  · Plan of Care Expires: 01/17/20  · Plan of Care Reviewed with: patient    Subjective   Pt agreeable to therapy.     Pain/Comfort:  · Pain Rating 1: 0/10 Pt c/o burning pain to back due to open wounds    Objective:     Communicated with: Nurse Mulligan prior to session.  Patient found HOB elevated with bed alarm upon OT entry to room.    General Precautions: Standard, fall   Orthopedic Precautions:N/A   Braces: N/A     Occupational Performance:     Bed Mobility:    · Patient completed Rolling/Turning to Right with modified independence  · Patient completed Scooting/Bridging with modified independence  · Patient completed Supine to Sit with modified independence  · Patient completed Sit to Supine with modified independence     Functional Mobility/Transfers:  · Patient completed Sit <> Stand Transfer with supervision  with  no assistive device and rolling walker   · Functional Mobility: Pt able to ambulate short distance bed>sink with SBA/no AD  and min unsteadiness noted. Pt able to ambulate sink>bed with S/RW.    Activities of Daily Living:  · Grooming: supervision for safety while standing at sink to perform oral care and to comb hair    AMPA 6 Click ADL: 21    Treatment & Education:  -Pt educated on OT role/POC.   -Importance of OOB activity with staff assistance.  - Due to open wounds on pt back, it is uncomfortable for pt to sit in chair. Encouraged side lying in bed or EOB activity with supervision.   -Safety during functional t/f and mobility with use of RW  -White board updated   -Multiple self-care tasks/functional mobility completed- assistance level noted above   · Pt educated on/completed 1x10 BUE HEP with yellow theraband in unsupported sit EOB:  · Shoulder horizontal ab/dduction  · External rotation  · Shoulder flexion/extension with ab/dduction (RUE only due to decreased LUE ROM)  · Elbow extension  · Elbow flexion   · Handout and theraband placed on tray table within reach of pt   · Pt required VC's to utilize slow pace to prevent quick fatigue  · Pt encouraged to complete 10 reps 2x/ day, pt verbalizes understanding  -All questions/concerns answered within OT scope of practice      Patient left right sidelying with all lines intact, call button in reach, bed alarm on, nurse notified and PCT and AVASYS presentEducation:      GOALS:   Multidisciplinary Problems     Occupational Therapy Goals        Problem: Occupational Therapy Goal    Goal Priority Disciplines Outcome Interventions   Occupational Therapy Goal     OT, PT/OT Ongoing, Progressing    Description:  Goals to be met by: 1/17/2020     Patient will increase functional independence with ADLs by performing:    Grooming while standing with Supervision. MET  Toileting from toilet with Contact Guard Assistance for hygiene and clothing management. MET  Step transfer with Contact Guard Assistance MET  Toilet transfer to toilet with Contact Guard Assistance. MET  Upper extremity exercise  program per handout, with assistance as needed. MET                       Time Tracking:     OT Date of Treatment: 01/16/20  OT Start Time: 1419  OT Stop Time: 1451  OT Total Time (min): 32 min    Billable Minutes:Self Care/Home Management 16  Therapeutic Exercise 16    PUJA Del Cid  1/16/2020

## 2020-01-16 NOTE — PT/OT/SLP PROGRESS
Physical Therapy Treatment and d/c summary    Patient Name:  Ruthann Doe   MRN:  [id number]    Recommendations:     Discharge Recommendations:  nursing facility, basic   Discharge Equipment Recommendations: none   Barriers to discharge: None    Assessment:     Ruthann Doe is a 79 y.o. female admitted with a medical diagnosis of Pemphigus vulgaris.  She presents with the following impairments/functional limitations:  weakness, impaired functional mobilty, impaired cognition, decreased safety awareness, gait instability, impaired balance, impaired self care skills, decreased lower extremity function, impaired skin, pain .    Pt is safe to amb in halls with unit staff supervsion without walker. Goals met.     Rehab Prognosis: n/a  Recent Surgery: * No surgery found *      Plan:     During this hospitalization, patient to be seen (d/c PT) to address the identified rehab impairments via gait training, therapeutic activities, therapeutic exercises and progress toward the following goals:    · Plan of Care Expires:  01/24/20    Subjective     Chief Complaint: rash  Patient/Family Comments/goals: not stated  Pain/Comfort:  · Pain Rating 1: (anders baker 4; back , open blisters)  · Location 2: back      Objective:     Communicated with RN prior to session.  Patient found supine with bed alarm upon PT entry to room.     General Precautions: Standard, fall   Orthopedic Precautions:N/A   Braces:       Functional Mobility:  · Bed Mobility:     · Supine to Sit: independence  · Sit to Supine: independence  · Transfers:     · Sit to Stand:  modified independence with no AD  · Toilet Transfer: modified independence with  no AD  using  Stand Pivot  · Gait: training both with and without RW x 800 ft; improved upright posture without RW/ no LOB or SOB, walked and talked; slight limp pt reports is chronic 2/2 leg length discrepancy from childhood  · Balance: good, standing dynamic, unsupported      AM-PAC 6 CLICK  MOBILITY  Turning over in bed (including adjusting bedclothes, sheets and blankets)?: 4  Sitting down on and standing up from a chair with arms (e.g., wheelchair, bedside commode, etc.): 4  Moving from lying on back to sitting on the side of the bed?: 4  Moving to and from a bed to a chair (including a wheelchair)?: 4  Need to walk in hospital room?: 4  Climbing 3-5 steps with a railing?: 4  Basic Mobility Total Score: 24       Therapeutic Activities and Exercises:   sup pt c/ toileting, washing hands and brushing teeth at sink; all without LOB.     Patient left seated EOB for lunch with bed alarm on and RN present..    GOALS:   Multidisciplinary Problems     Physical Therapy Goals     Not on file          Multidisciplinary Problems (Resolved)        Problem: Physical Therapy Goal    Goal Priority Disciplines Outcome Goal Variances Interventions   Physical Therapy Goal   (Resolved)     PT, PT/OT Met     Description:  Goals to be met by: 2020     Patient will increase functional independence with mobility by performin. Bed to chair transfer with Modified Needles   2. Gait x 400 ft without assistive device and distant supervision.                             Time Tracking:     PT Received On: 20  PT Start Time: 1127     PT Stop Time: 1158  PT Total Time (min): 31 min     Billable Minutes: Gait Training 25    Treatment Type: Treatment  PT/PTA: PT     PTA Visit Number: 0     Tia Disla, PT  2020

## 2020-01-16 NOTE — ASSESSMENT & PLAN NOTE
"  See physical exam. Patient was seen by Dermatology Dr. Hinton in Sept for similar lesion but not this extensive. Lesion cleared with steroid injections? X 2. Daughter Ayse reports biopsy showed "Dermatitis of unknown origin."   Denies any new antibiotics up untial 12/29 placed on clindamycin for "cellulits" when seen by [facility] [address].   Will hold clindamycin  Consult Dermatology -Called office [phone] at approx 1030 and again 1100 however provider will not available until 1 pm.  Consult wound care to assist.       Dermatology recommended start IV and topical steroids for Pemphigus Vulgaris.   IV solumedrol 30 mg IV BID x 5 days  Triamcinolone 0.1 % cream BID   For punch biopsy today  If no improvement in 5 days, may need to start IVIG.     Assume derm will see patient today?    Continue steroids today.  May d/c tomorrow     Will ask nursing to contact dermatology. I need a plan/recs     Spoke with Derm on 1/15- awaiting confirmation test of this diagnosis- they said they would have to day and text me results.     "

## 2020-01-16 NOTE — PLAN OF CARE
Problem: Self-Care Deficit  Goal: Improved Ability to Complete Activities of Daily Living  Outcome: Ongoing, Progressing  Intervention: Promote Activity and Functional [place]  Flowsheets (Taken 1/16/2020 0501)  Self-Care Promotion: independence encouraged; BADL personal objects within reach; BADL personal routines maintained; meal setup provided; safe use of adaptive equipment encouraged  Activity Assistance Provided: assistance, 1 person; assistance, stand-by  Adaptive Equipment Use: other (see comments) (Ambulates with walker to bathroom)

## 2020-01-16 NOTE — SUBJECTIVE & OBJECTIVE
Interval History: No new issues.     Review of Systems   Constitutional: Negative for activity change.   HENT: Negative for congestion.    Respiratory: Negative for chest tightness and shortness of breath.    Cardiovascular: Negative for chest pain.   Genitourinary: Negative for difficulty urinating.   Musculoskeletal: Negative for arthralgias.   Psychiatric/Behavioral: Negative for agitation.     Objective:     Vital Signs (Most Recent):  Temp: 97.7 °F (36.5 °C) (01/16/20 0725)  Pulse: 67 (01/16/20 0725)  Resp: 17 (01/16/20 0725)  BP: 127/60 (01/16/20 0725)  SpO2: 100 % (01/16/20 0725) Vital Signs (24h Range):  Temp:  [97 °F (36.1 °C)-98.5 °F (36.9 °C)] 97.7 °F (36.5 °C)  Pulse:  [67-86] 67  Resp:  [17-20] 17  SpO2:  [96 %-100 %] 100 %  BP: (108-138)/(54-64) 127/60     Weight: 58.8 kg (129 lb 10.1 oz)  Body mass index is 22.25 kg/m².    Intake/Output Summary (Last 24 hours) at 1/16/2020 1055  Last data filed at 1/16/2020 0600  Gross per 24 hour   Intake 0 ml   Output --   Net 0 ml      Physical Exam   Constitutional: She is oriented to person, place, and time. She appears well-developed and well-nourished.   HENT:   Head: Normocephalic and atraumatic.   Neurological: She is alert and oriented to person, place, and time.   Skin: Skin is warm and dry.   Psychiatric: She has a normal mood and affect. Her behavior is normal.   Nursing note and vitals reviewed.      Significant Labs:   BMP:   Recent Labs   Lab 01/16/20  0415   *      K 4.3   *   CO2 17*   BUN 34*   CREATININE 1.0   CALCIUM 8.3*     CBC: No results for input(s): WBC, HGB, HCT, PLT in the last 48 hours.    Significant Imaging:

## 2020-01-16 NOTE — PLAN OF CARE
Patient is currently on Room air with oxygen saturation of 98%.  No apparent distress noted at present time.  Will continue to monitor.

## 2020-01-17 LAB
ANION GAP SERPL CALC-SCNC: 10 MMOL/L (ref 8–16)
BUN SERPL-MCNC: 25 MG/DL (ref 8–23)
CALCIUM SERPL-MCNC: 8.1 MG/DL (ref 8.7–10.5)
CHLORIDE SERPL-SCNC: 111 MMOL/L (ref 95–110)
CO2 SERPL-SCNC: 19 MMOL/L (ref 23–29)
CREAT SERPL-MCNC: 1 MG/DL (ref 0.5–1.4)
EST. GFR  (AFRICAN AMERICAN): >60 ML/MIN/1.73 M^2
EST. GFR  (NON AFRICAN AMERICAN): 54 ML/MIN/1.73 M^2
GLUCOSE SERPL-MCNC: 108 MG/DL (ref 70–110)
POCT GLUCOSE: 104 MG/DL (ref 70–110)
POCT GLUCOSE: 160 MG/DL (ref 70–110)
POCT GLUCOSE: 164 MG/DL (ref 70–110)
POCT GLUCOSE: 248 MG/DL (ref 70–110)
POCT GLUCOSE: 48 MG/DL (ref 70–110)
POTASSIUM SERPL-SCNC: 4.2 MMOL/L (ref 3.5–5.1)
SODIUM SERPL-SCNC: 140 MMOL/L (ref 136–145)

## 2020-01-17 PROCEDURE — 25000003 PHARM REV CODE 250: Performed by: NURSE PRACTITIONER

## 2020-01-17 PROCEDURE — 21400001 HC TELEMETRY ROOM

## 2020-01-17 PROCEDURE — 63600175 PHARM REV CODE 636 W HCPCS: Performed by: INTERNAL MEDICINE

## 2020-01-17 PROCEDURE — 25000003 PHARM REV CODE 250: Performed by: INTERNAL MEDICINE

## 2020-01-17 PROCEDURE — 36415 COLL VENOUS BLD VENIPUNCTURE: CPT

## 2020-01-17 PROCEDURE — 25000003 PHARM REV CODE 250: Performed by: EMERGENCY MEDICINE

## 2020-01-17 PROCEDURE — 80048 BASIC METABOLIC PNL TOTAL CA: CPT

## 2020-01-17 PROCEDURE — 25000003 PHARM REV CODE 250: Performed by: HOSPITALIST

## 2020-01-17 RX ORDER — PREDNISONE 20 MG/1
20 TABLET ORAL DAILY
Status: DISCONTINUED | OUTPATIENT
Start: 2020-01-17 | End: 2020-01-19

## 2020-01-17 RX ADMIN — SENNOSIDES AND DOCUSATE SODIUM 1 TABLET: 8.6; 5 TABLET ORAL at 10:01

## 2020-01-17 RX ADMIN — ASPIRIN 325 MG: 325 TABLET, DELAYED RELEASE ORAL at 09:01

## 2020-01-17 RX ADMIN — PRAVASTATIN SODIUM 40 MG: 40 TABLET ORAL at 10:01

## 2020-01-17 RX ADMIN — CARVEDILOL 6.25 MG: 6.25 TABLET, FILM COATED ORAL at 10:01

## 2020-01-17 RX ADMIN — CLOPIDOGREL BISULFATE 75 MG: 75 TABLET ORAL at 09:01

## 2020-01-17 RX ADMIN — TRIAMCINOLONE ACETONIDE: 1 CREAM TOPICAL at 09:01

## 2020-01-17 RX ADMIN — INSULIN ASPART 12 UNITS: 100 INJECTION, SOLUTION INTRAVENOUS; SUBCUTANEOUS at 12:01

## 2020-01-17 RX ADMIN — CARVEDILOL 6.25 MG: 6.25 TABLET, FILM COATED ORAL at 09:01

## 2020-01-17 RX ADMIN — INSULIN ASPART 12 UNITS: 100 INJECTION, SOLUTION INTRAVENOUS; SUBCUTANEOUS at 09:01

## 2020-01-17 RX ADMIN — PREDNISONE 20 MG: 20 TABLET ORAL at 09:01

## 2020-01-17 RX ADMIN — INSULIN ASPART 1 UNITS: 100 INJECTION, SOLUTION INTRAVENOUS; SUBCUTANEOUS at 10:01

## 2020-01-17 RX ADMIN — TRIAMCINOLONE ACETONIDE: 1 CREAM TOPICAL at 10:01

## 2020-01-17 RX ADMIN — PANTOPRAZOLE SODIUM 40 MG: 40 TABLET, DELAYED RELEASE ORAL at 09:01

## 2020-01-17 RX ADMIN — SENNOSIDES AND DOCUSATE SODIUM 1 TABLET: 8.6; 5 TABLET ORAL at 09:01

## 2020-01-17 RX ADMIN — LOSARTAN POTASSIUM 25 MG: 25 TABLET ORAL at 09:01

## 2020-01-17 NOTE — NURSING
Dr. Perales notified that pt's BG is 104 and pt has scheduled Novolog 12 units. Dr. Perales states to hold scheduled Novolog at this time.

## 2020-01-17 NOTE — SUBJECTIVE & OBJECTIVE
Interval History: No new issues.        Review of Systems   Constitutional: Negative for activity change.   HENT: Negative for congestion.    Respiratory: Negative for chest tightness and shortness of breath.    Cardiovascular: Negative for chest pain.   Genitourinary: Negative for difficulty urinating.   Musculoskeletal: Negative for arthralgias.   Psychiatric/Behavioral: Negative for agitation.     Objective:     Vital Signs (Most Recent):  Temp: 97.9 °F (36.6 °C) (01/17/20 0426)  Pulse: 68 (01/17/20 0717)  Resp: 17 (01/17/20 0717)  BP: 126/61 (01/17/20 0717)  SpO2: 97 % (01/17/20 0717) Vital Signs (24h Range):  Temp:  [97.6 °F (36.4 °C)-98.2 °F (36.8 °C)] 97.9 °F (36.6 °C)  Pulse:  [65-82] 68  Resp:  [16-18] 17  SpO2:  [95 %-100 %] 97 %  BP: (102-133)/(49-71) 126/61     Weight: 61 kg (134 lb 7.7 oz)  Body mass index is 23.08 kg/m².    Intake/Output Summary (Last 24 hours) at 1/17/2020 0822  Last data filed at 1/16/2020 1700  Gross per 24 hour   Intake 480 ml   Output --   Net 480 ml      Physical Exam   Constitutional: She is oriented to person, place, and time. She appears well-developed and well-nourished.   HENT:   Head: Normocephalic and atraumatic.   Neurological: She is alert and oriented to person, place, and time.   Skin: Skin is warm and dry.   Psychiatric: She has a normal mood and affect. Her behavior is normal.   Nursing note and vitals reviewed.      Significant Labs:   BMP:   Recent Labs   Lab 01/16/20  0415   *      K 4.3   *   CO2 17*   BUN 34*   CREATININE 1.0   CALCIUM 8.3*     CBC: No results for input(s): WBC, HGB, HCT, PLT in the last 48 hours.    Significant Imaging:

## 2020-01-17 NOTE — ASSESSMENT & PLAN NOTE
"  See physical exam. Patient was seen by Dermatology Dr. Hinton in Sept for similar lesion but not this extensive. Lesion cleared with steroid injections? X 2. Daughter Ayse reports biopsy showed "Dermatitis of unknown origin."   Denies any new antibiotics up untial 12/29 placed on clindamycin for "cellulits" when seen [facility] [address].   Will hold clindamycin  Consult Dermatology -Called office [phone] at approx 1030 and again 1100 however provider will not available until 1 pm.  Consult wound care to assist.       Dermatology recommended start IV and topical steroids for Pemphigus Vulgaris.   IV solumedrol 30 mg IV BID x 5 days  Triamcinolone 0.1 % cream BID   For punch biopsy today  If no improvement in 5 days, may need to start IVIG.     Assume derm will see patient today?    Continue steroids today.  May d/c tomorrow     Will ask nursing to contact dermatology. I need a plan/recs     Spoke with Derm on 1/15- awaiting confirmation test of this diagnosis- they said they would have to day and text me results.     Spoke to Derm on 1/17.  They noted Bx results may not be back until Monday.  Not sure why SNF will not accept until then. Will speak to case management this am.       "

## 2020-01-17 NOTE — PT/OT/SLP DISCHARGE
Occupational Therapy Discharge Summary    Ruthann Doe  MRN: [id number]   Principal Problem: Pemphigus vulgaris      Patient Discharged from acute Occupational Therapy on 1/17/2020.  Please refer to prior OT note dated 1/16/2020 for functional status.    Assessment:      Patient has met all goals and is not appropriate for therapy.    Objective:     GOALS:   Multidisciplinary Problems     Occupational Therapy Goals        Problem: Occupational Therapy Goal    Goal Priority Disciplines Outcome Interventions   Occupational Therapy Goal     OT, PT/OT Ongoing, Progressing    Description:  Goals to be met by: 1/17/2020     Patient will increase functional independence with ADLs by performing:    Grooming while standing with Supervision. MET  Toileting from toilet with Contact Guard Assistance for hygiene and clothing management. MET  Step transfer with Contact Guard Assistance MET  Toilet transfer to toilet with Contact Guard Assistance. MET  Upper extremity exercise program per handout, with assistance as needed. MET                        Reasons for Discontinuation of Therapy Services  Satisfactory goal achievement per [facility] assistant following treatment.     Plan:     Patient Discharged to: remains in hospital [facility]    ROSALES Merino, MS  1/17/2020

## 2020-01-17 NOTE — NURSING
End of shift bedside report given to SHANTELL Beasley. Patient in no apparent distress.     12 hour chart check complete.

## 2020-01-17 NOTE — NURSING
Bedside shift report received from SHANTELL Zhao. Communication board has been updated. NAD noted. Will continue to monitor. Pt is asleep at this time and resting comfortably.

## 2020-01-17 NOTE — UM SECONDARY REVIEW
[facility]    Discharge Planning Concern     Waiting on bx    {[facility] Review Outcome:[id number]}

## 2020-01-17 NOTE — PLAN OF CARE
Problem: Diabetes Comorbidity  Goal: Blood Glucose Level Within Desired Range  Outcome: Ongoing, Progressing  Intervention: Maintain Glycemic Control  Flowsheets (Taken 1/17/2020 1301)  Glycemic Management: blood glucose monitoring; supplemental insulin given     Problem: Restraint, Nonbehavioral (Nonviolent)  Goal: Discontinuation Criteria Achieved  Outcome: Met  Goal: Personal Dignity and Safety Maintained  Outcome: Met

## 2020-01-17 NOTE — PLAN OF CARE
Problem: Diabetes Comorbidity  Goal: Blood Glucose Level Within Desired Range  Outcome: Ongoing, Progressing  Intervention: Maintain Glycemic Control  Flowsheets (Taken 1/16/2020 1803)  Glycemic Management: blood glucose monitoring; supplemental insulin given

## 2020-01-17 NOTE — PLAN OF CARE
Problem: Fall Injury Risk  Goal: Absence of Fall and Fall-Related Injury  Outcome: Ongoing, Progressing  Intervention: Identify and Manage Contributors to Fall Injury Risk  Flowsheets (Taken 1/17/2020 0521)  Self-Care Promotion: independence encouraged; BADL personal objects within reach; BADL personal routines maintained  Medication Review/Management: medications reviewed  Intervention: Promote Injury-Free Environment  Flowsheets (Taken 1/17/2020 0521)  Safety Promotion/Fall Prevention: assistive device/personal item within reach; lighting adjusted  Environmental Safety Modification: room near unit station; lighting adjusted     Problem: Adult Inpatient Plan of Care  Goal: Plan of Care Review  Outcome: Ongoing, Progressing  Flowsheets (Taken 1/17/2020 0521)  Plan of Care Reviewed With: patient  Goal: Patient-Specific Goal (Individualization)  Outcome: Ongoing, Progressing  Goal: Absence of Hospital-Acquired Illness or Injury  Outcome: Ongoing, Progressing  Intervention: Identify and Manage Fall Risk  Flowsheets (Taken 1/17/2020 0521)  Safety Promotion/Fall Prevention: assistive device/personal item within reach; lighting adjusted  Intervention: Prevent VTE (venous thromboembolism)  Flowsheets (Taken 1/17/2020 0521)  VTE Prevention/Management: ROM (active) performed; ROM (passive) performed  Goal: Optimal Comfort and Wellbeing  Outcome: Ongoing, Progressing  Intervention: Provide Person-Centered Care  Flowsheets (Taken 1/17/2020 0521)  Trust Relationship/Rapport: care explained; choices provided; emotional support provided  Goal: Readiness for Transition of Care  Outcome: Ongoing, Progressing  Goal: Rounds/Family Conference  Outcome: Ongoing, Progressing     Problem: Wound  Goal: Optimal Wound Healing  Outcome: Ongoing, Progressing  Intervention: Promote Effective Wound Healing  Flowsheets (Taken 1/17/2020 0521)  Oral Nutrition Promotion: physical activity promoted; rest periods promoted  Sleep/Rest Enhancement:  awakenings minimized; regular sleep/rest pattern promoted  Pain Management Interventions: relaxation techniques promoted     Problem: Skin Injury Risk Increased  Goal: Skin Health and Integrity  Outcome: Ongoing, Progressing  Intervention: Optimize Skin Protection  Flowsheets (Taken 1/17/2020 0521)  Pressure Reduction Techniques: frequent weight shift encouraged  Skin Protection: tubing/devices free from skin contact; skin-to-device areas padded  Head of Bed (HOB): HOB at 30-45 degrees  Intervention: Promote and Optimize Oral Intake  Flowsheets (Taken 1/17/2020 0521)  Oral Nutrition Promotion: physical activity promoted; rest periods promoted     Problem: Diabetes Comorbidity  Goal: Blood Glucose Level Within Desired Range  Outcome: Ongoing, Progressing     Problem: Infection  Goal: Infection Symptom Resolution  Outcome: Ongoing, Progressing     Problem: Coping Ineffective  Goal: Effective Coping  Outcome: Ongoing, Progressing     Problem: Restraint, Nonbehavioral (Nonviolent)  Goal: Discontinuation Criteria Achieved  Outcome: Ongoing, Progressing  Goal: Personal Dignity and Safety Maintained  Outcome: Ongoing, Progressing     Problem: Self-Care Deficit  Goal: Improved Ability to Complete Activities of Daily Living  Outcome: Ongoing, Progressing

## 2020-01-17 NOTE — PROGRESS NOTES
[facility] Hospital Medicine  Progress Note    Patient Name: Ruthann Doe  MRN: [id number]  Patient Class: IP- Inpatient   Admission Date: 1/8/2020  Length of Stay: 8 days  Attending Physician: Sammy Perales MD  Primary Care Provider: Jesse Partida MD        Subjective:     Principal Problem:Pemphigus vulgaris        HPI:  Mrs. Doe is a 78 yo female with significant hypertension, hyperlipidemia, diabetes type 2, dementia, PPM/AICD?, CAD status post MI, chronic systolic diastolic heart failure, and pulmonary hypertension who was brought by Daughter Ayse to hospital for worsen dementia, recurrent falls and need for NHP. Patient lives alone. Daughter Ayse have been estranged from patient for over 10 years but stepped in few years ago to assist. Daughter Ayse reports patient dementia with visual /aduitory hallucinations for over 2 years and consisting of dead people and cats for 2 years. Baseline ambulates with walker up until 10 days ago.    Patient also found to have diffuse body lesions consisting for blisters and scab over lesion through out body sparring the buttock and perineum. Patient had similar lesions in Sept however only on torso which cleared up with steroid injections in Sept and October. By end of November, lesions completely resolved. 10 days ago patient was brought to Urgent Care on [address] then told had cellulitis prescribed clindamycin 300 mg TID.    CXR no acute process. CTH  No acute intracranial abnormality. Afebrile and no leukocytosis. UA no evidence of infection.     Overview/Hospital Course:  Mrs. Doe is a 78 yo female with significant hypertension, hyperlipidemia, diabetes type 2, dementia, PPM/AICD?, CAD status post MI, chronic systolic diastolic heart failure, and pulmonary hypertension who was brought by Daughter Ayse to hospital for worsen dementia, recurrent falls and need for NHP. Patient lives alone. Daughter Ayse have been estranged from  patient for over 10 years but stepped in few years ago to assist. Daughter Ayse reports patient dementia with visual /aduitory hallucinations for over 2 years and consisting of dead people and cats for 2 years. Baseline ambulates with walker up until 10 days ago.    Patient also found to have diffuse body lesions consisting for blisters and scab over lesion through out body sparring the buttock and perineum. Patient had similar lesions in Sept however only on torso which cleared up with steroid injections in Sept and October. By end of November, lesions completely resolved. 10 days ago patient was brought to Urgent Care on [facility] then told had cellulitis prescribed clindamycin 300 mg TID.    CXR no acute process. CTH  No acute intracranial abnormality. Afebrile and no leukocytosis. UA no evidence of infection.  PT/OT evaluated the patient and recommended SNF. Patient was found to be in A-fib and cards consulted. No NOAC due to frequent falls and non-compliance.  Dermatology was consulted and diagnosed patient with pemphigus vulgaris.  The patient was started on IV steroids and completed a course of 5 days.  Patient was accepted at [facility].  Patient's punch bx results held the discharge.       Interval History: No new issues.        Review of Systems   Constitutional: Negative for activity change.   HENT: Negative for congestion.    Respiratory: Negative for chest tightness and shortness of breath.    Cardiovascular: Negative for chest pain.   Genitourinary: Negative for difficulty urinating.   Musculoskeletal: Negative for arthralgias.   Psychiatric/Behavioral: Negative for agitation.     Objective:     Vital Signs (Most Recent):  Temp: 97.9 °F (36.6 °C) (01/17/20 0426)  Pulse: 68 (01/17/20 0717)  Resp: 17 (01/17/20 0717)  BP: 126/61 (01/17/20 0717)  SpO2: 97 % (01/17/20 0717) Vital Signs (24h Range):  Temp:  [97.6 °F (36.4 °C)-98.2 °F (36.8 °C)] 97.9 °F (36.6 °C)  Pulse:  [65-82] 68  Resp:  [16-18]  "17  SpO2:  [95 %-100 %] 97 %  BP: (102-133)/(49-71) 126/61     Weight: 61 kg (134 lb 7.7 oz)  Body mass index is 23.08 kg/m².    Intake/Output Summary (Last 24 hours) at 1/17/2020 0822  Last data filed at 1/16/2020 1700  Gross per 24 hour   Intake 480 ml   Output --   Net 480 ml      Physical Exam   Constitutional: She is oriented to person, place, and time. She appears well-developed and well-nourished.   HENT:   Head: Normocephalic and atraumatic.   Neurological: She is alert and oriented to person, place, and time.   Skin: Skin is warm and dry.   Psychiatric: She has a normal mood and affect. Her behavior is normal.   Nursing note and vitals reviewed.      Significant Labs:   BMP:   Recent Labs   Lab 01/16/20  0415   *      K 4.3   *   CO2 17*   BUN 34*   CREATININE 1.0   CALCIUM 8.3*     CBC: No results for input(s): WBC, HGB, HCT, PLT in the last 48 hours.    Significant Imaging:      Assessment/Plan:      * Pemphigus vulgaris    See physical exam. Patient was seen by Dermatology Dr. Hinton in Sept for similar lesion but not this extensive. Lesion cleared with steroid injections? X 2. Daughter Ayse reports biopsy showed "Dermatitis of unknown origin."   Denies any new antibiotics up untial 12/29 placed on clindamycin for "cellulits" when seen by [facility] [address].   Will hold clindamycin  Consult Dermatology -Called office [phone] at approx 1030 and again 1100 however provider will not available until 1 pm.  Consult wound care to assist.       Dermatology recommended start IV and topical steroids for Pemphigus Vulgaris.   IV solumedrol 30 mg IV BID x 5 days  Triamcinolone 0.1 % cream BID   For punch biopsy today  If no improvement in 5 days, may need to start IVIG.     Assume derm will see patient today?    Continue steroids today.  May d/c tomorrow     Will ask nursing to contact dermatology. I need a plan/recs     Spoke with Derm on 1/15- awaiting confirmation test of this diagnosis- they " said they would have to day and text me results.     Spoke to Derm on 1/17.  They noted Bx results may not be back until Monday.  Not sure why SNF will not accept until then. Will speak to case management this am.         Paroxysmal atrial fibrillation  No NOAC per cards. Rate controlled       Chronic combined systolic and diastolic heart failure  EF of 25-30%,Euvolemic. Not on diuretics at home.  Continue metoprolol. Resume ACEI/ARB if renal function stable and BP tolerates.       Pressure injury of left heel, unstageable  Apply boot. Wound care consult       Recurrent falls  See above #1.     PT/OT rec: SNF.   Likely not a candidate for anti-coagulation due to falls.        Diffuse Skin Breakdown and Lesions  See above      CKD stage 2 due to type 2 diabetes mellitus  Stable. Repeat labs pending.     With some pre-renal failure. Will hold lasix       Anemia, chronic disease  Obtain iron studies, vitamin B 12, folate level      Dementia without behavioral disturbance  Per patient, history of dementia with visual auditory hallucinations x 2 years. Baseline oriented to self and place but not time.   EKG accelerated Junctional 72. Troponin x1  Lactate normal.  UA no infection. CTH no acute abnormality.   Contributing to falls (Dec 4, Jan 3, and yesterday Jaison 8 x 2 ). All the falls have been unwitnessed as patient lives alone.  Currently mental status at baseline  PT/OT consult  Consulted SW for NH placement.      Type 2 diabetes mellitus with hyperglycemia  Lab Results   Component Value Date    HGBA1C 7.4 (H) 01/09/2020   obtain A1c.   SSI and adjust accordingly,  On prandial ,basal,and SSI at thi stime.    Increasing insulin for solumedrol    Well controlled now.  Will have to cut back insulin once steroids stopped     Elevated sugars this am.  SS. Hopefully can wean or cut back steroids soon     Now that steroids are being cut back- better glucose control. Will cut back on insulin           AICD (automatic  cardioverter/defibrillator) present  No current issues.       Essential hypertension  Continue metoprolol. Resume ramipril ? Hydralazine prn.       CAD (coronary artery disease)  Daughter Ayse reports history of MI/stents/AICD/PPM but not sure dates and previous test.   Denies chest pain or shortness of breath  Continue ASA, plavix, metoprolol. Previously on ramipril.        VTE Risk Mitigation (From admission, onward)         Ordered     IP VTE HIGH RISK PATIENT  Once      01/09/20 0815     Place CHANO hose  Until discontinued      01/09/20 0815     Place sequential compression device  Until discontinued      01/09/20 0815                Awaiting bx results. Medically stable for SNF. Will speak to case management this am- not sure why SNF will not take her until bx results are back-  It will not change anything       Sammy Rodrigues MD  Department of Hospital Medicine   [facility]

## 2020-01-18 LAB
POCT GLUCOSE: 172 MG/DL (ref 70–110)
POCT GLUCOSE: 211 MG/DL (ref 70–110)
POCT GLUCOSE: 242 MG/DL (ref 70–110)
POCT GLUCOSE: 94 MG/DL (ref 70–110)

## 2020-01-18 PROCEDURE — 25000003 PHARM REV CODE 250: Performed by: NURSE PRACTITIONER

## 2020-01-18 PROCEDURE — 21400001 HC TELEMETRY ROOM

## 2020-01-18 PROCEDURE — 25000003 PHARM REV CODE 250: Performed by: HOSPITALIST

## 2020-01-18 PROCEDURE — 63600175 PHARM REV CODE 636 W HCPCS: Performed by: INTERNAL MEDICINE

## 2020-01-18 PROCEDURE — 25000003 PHARM REV CODE 250: Performed by: INTERNAL MEDICINE

## 2020-01-18 PROCEDURE — 94761 N-INVAS EAR/PLS OXIMETRY MLT: CPT

## 2020-01-18 PROCEDURE — 25000003 PHARM REV CODE 250: Performed by: EMERGENCY MEDICINE

## 2020-01-18 RX ADMIN — PANTOPRAZOLE SODIUM 40 MG: 40 TABLET, DELAYED RELEASE ORAL at 09:01

## 2020-01-18 RX ADMIN — INSULIN ASPART 1 UNITS: 100 INJECTION, SOLUTION INTRAVENOUS; SUBCUTANEOUS at 08:01

## 2020-01-18 RX ADMIN — CARVEDILOL 6.25 MG: 6.25 TABLET, FILM COATED ORAL at 09:01

## 2020-01-18 RX ADMIN — INSULIN ASPART 12 UNITS: 100 INJECTION, SOLUTION INTRAVENOUS; SUBCUTANEOUS at 05:01

## 2020-01-18 RX ADMIN — SENNOSIDES AND DOCUSATE SODIUM 1 TABLET: 8.6; 5 TABLET ORAL at 09:01

## 2020-01-18 RX ADMIN — CLOPIDOGREL BISULFATE 75 MG: 75 TABLET ORAL at 09:01

## 2020-01-18 RX ADMIN — INSULIN ASPART 2 UNITS: 100 INJECTION, SOLUTION INTRAVENOUS; SUBCUTANEOUS at 05:01

## 2020-01-18 RX ADMIN — LOSARTAN POTASSIUM 25 MG: 25 TABLET ORAL at 09:01

## 2020-01-18 RX ADMIN — PREDNISONE 20 MG: 20 TABLET ORAL at 09:01

## 2020-01-18 RX ADMIN — TRIAMCINOLONE ACETONIDE: 1 CREAM TOPICAL at 09:01

## 2020-01-18 RX ADMIN — CARVEDILOL 6.25 MG: 6.25 TABLET, FILM COATED ORAL at 08:01

## 2020-01-18 RX ADMIN — INSULIN ASPART 12 UNITS: 100 INJECTION, SOLUTION INTRAVENOUS; SUBCUTANEOUS at 01:01

## 2020-01-18 RX ADMIN — TRIAMCINOLONE ACETONIDE: 1 CREAM TOPICAL at 08:01

## 2020-01-18 RX ADMIN — PRAVASTATIN SODIUM 40 MG: 40 TABLET ORAL at 08:01

## 2020-01-18 RX ADMIN — ASPIRIN 325 MG: 325 TABLET, DELAYED RELEASE ORAL at 09:01

## 2020-01-18 NOTE — SUBJECTIVE & OBJECTIVE
Interval History: No new issues     Review of Systems   Constitutional: Negative for activity change.   HENT: Negative for congestion.    Respiratory: Negative for chest tightness and shortness of breath.    Cardiovascular: Negative for chest pain.   Genitourinary: Negative for difficulty urinating.   Musculoskeletal: Negative for arthralgias.   Psychiatric/Behavioral: Negative for agitation.     Objective:     Vital Signs (Most Recent):  Temp: 98 °F (36.7 °C) (01/18/20 0754)  Pulse: 79 (01/18/20 0754)  Resp: 17 (01/18/20 0754)  BP: (!) 157/66 (01/18/20 0754)  SpO2: 95 % (01/18/20 0800) Vital Signs (24h Range):  Temp:  [97.4 °F (36.3 °C)-98.6 °F (37 °C)] 98 °F (36.7 °C)  Pulse:  [76-94] 79  Resp:  [17-18] 17  SpO2:  [93 %-100 %] 95 %  BP: (115-171)/(56-75) 157/66     Weight: 61 kg (134 lb 7.7 oz)  Body mass index is 23.08 kg/m².    Intake/Output Summary (Last 24 hours) at 1/18/2020 1024  Last data filed at 1/18/2020 0900  Gross per 24 hour   Intake 480 ml   Output 240 ml   Net 240 ml      Physical Exam   Constitutional: She is oriented to person, place, and time. She appears well-developed and well-nourished.   HENT:   Head: Normocephalic and atraumatic.   Neurological: She is alert and oriented to person, place, and time.   Skin: Skin is warm and dry.   Psychiatric: She has a normal mood and affect. Her behavior is normal.   Nursing note and vitals reviewed.      Significant Labs:   BMP:   Recent Labs   Lab 01/17/20  0836         K 4.2   *   CO2 19*   BUN 25*   CREATININE 1.0   CALCIUM 8.1*     CBC: No results for input(s): WBC, HGB, HCT, PLT in the last 48 hours.    Significant Imaging:

## 2020-01-18 NOTE — NURSING
Patient bedside report has been completed and given to SHANTELL Kelley. Chart check has been completed. NAD noted. Pt ambulated with stand by assist. No IV per orders. Cream applied to affected areas. Pt sat up in the chair throughout the shift. Pt has been pleasant. Awating placement to NH. [facility] is not an option and now working on placement to [facility].

## 2020-01-18 NOTE — PROGRESS NOTES
[facility] Hospital Medicine  Progress Note    Patient Name: Ruthann Doe  MRN: [id number]  Patient Class: IP- Inpatient   Admission Date: 1/8/2020  Length of Stay: 9 days  Attending Physician: Sammy Perales MD  Primary Care Provider: Jesse Partida MD        Subjective:     Principal Problem:Pemphigus vulgaris        HPI:  Mrs. Doe is a 78 yo female with significant hypertension, hyperlipidemia, diabetes type 2, dementia, PPM/AICD?, CAD status post MI, chronic systolic diastolic heart failure, and pulmonary hypertension who was brought by Daughter Ayse to hospital for worsen dementia, recurrent falls and need for NHP. Patient lives alone. Daughter Ayse have been estranged from patient for over 10 years but stepped in few years ago to assist. Daughter Ayse reports patient dementia with visual /aduitory hallucinations for over 2 years and consisting of dead people and cats for 2 years. Baseline ambulates with walker up until 10 days ago.    Patient also found to have diffuse body lesions consisting for blisters and scab over lesion through out body sparring the buttock and perineum. Patient had similar lesions in Sept however only on torso which cleared up with steroid injections in Sept and October. By end of November, lesions completely resolved. 10 days ago patient was brought to Urgent Care on [address] then told had cellulitis prescribed clindamycin 300 mg TID.    CXR no acute process. CTH  No acute intracranial abnormality. Afebrile and no leukocytosis. UA no evidence of infection.     Overview/Hospital Course:  Mrs. Doe is a 78 yo female with significant hypertension, hyperlipidemia, diabetes type 2, dementia, PPM/AICD?, CAD status post MI, chronic systolic diastolic heart failure, and pulmonary hypertension who was brought by Daughter Ayse to hospital for worsen dementia, recurrent falls and need for NHP. Patient lives alone. Daughter Ayse have been estranged from  patient for over 10 years but stepped in few years ago to assist. Daughter Ayse reports patient dementia with visual /aduitory hallucinations for over 2 years and consisting of dead people and cats for 2 years. Baseline ambulates with walker up until 10 days ago.    Patient also found to have diffuse body lesions consisting for blisters and scab over lesion through out body sparring the buttock and perineum. Patient had similar lesions in Sept however only on torso which cleared up with steroid injections in Sept and October. By end of November, lesions completely resolved. 10 days ago patient was brought to Urgent Care on [facility] then told had cellulitis prescribed clindamycin 300 mg TID.    CXR no acute process. CTH  No acute intracranial abnormality. Afebrile and no leukocytosis. UA no evidence of infection.  PT/OT evaluated the patient and recommended SNF. Patient was found to be in A-fib and cards consulted. No NOAC due to frequent falls and non-compliance.  Dermatology was consulted and diagnosed patient with pemphigus vulgaris.  The patient was started on IV steroids and completed a course of 5 days.  Patient was accepted at [facility].  Patient's punch bx results held the discharge.       Interval History: No new issues     Review of Systems   Constitutional: Negative for activity change.   HENT: Negative for congestion.    Respiratory: Negative for chest tightness and shortness of breath.    Cardiovascular: Negative for chest pain.   Genitourinary: Negative for difficulty urinating.   Musculoskeletal: Negative for arthralgias.   Psychiatric/Behavioral: Negative for agitation.     Objective:     Vital Signs (Most Recent):  Temp: 98 °F (36.7 °C) (01/18/20 0754)  Pulse: 79 (01/18/20 0754)  Resp: 17 (01/18/20 0754)  BP: (!) 157/66 (01/18/20 0754)  SpO2: 95 % (01/18/20 0800) Vital Signs (24h Range):  Temp:  [97.4 °F (36.3 °C)-98.6 °F (37 °C)] 98 °F (36.7 °C)  Pulse:  [76-94] 79  Resp:  [17-18] 17  SpO2:  [93  "%-100 %] 95 %  BP: (115-171)/(56-75) 157/66     Weight: 61 kg (134 lb 7.7 oz)  Body mass index is 23.08 kg/m².    Intake/Output Summary (Last 24 hours) at 1/18/2020 1024  Last data filed at 1/18/2020 0900  Gross per 24 hour   Intake 480 ml   Output 240 ml   Net 240 ml      Physical Exam   Constitutional: She is oriented to person, place, and time. She appears well-developed and well-nourished.   HENT:   Head: Normocephalic and atraumatic.   Neurological: She is alert and oriented to person, place, and time.   Skin: Skin is warm and dry.   Psychiatric: She has a normal mood and affect. Her behavior is normal.   Nursing note and vitals reviewed.      Significant Labs:   BMP:   Recent Labs   Lab 01/17/20  0836         K 4.2   *   CO2 19*   BUN 25*   CREATININE 1.0   CALCIUM 8.1*     CBC: No results for input(s): WBC, HGB, HCT, PLT in the last 48 hours.    Significant Imaging:      Assessment/Plan:      * Pemphigus vulgaris    See physical exam. Patient was seen by Dermatology Dr. Hinton in Sept for similar lesion but not this extensive. Lesion cleared with steroid injections? X 2. Daughter Ayse reports biopsy showed "Dermatitis of unknown origin."   Denies any new antibiotics up untial 12/29 placed on clindamycin for "cellulits" when seen by [facility] [address]   Will hold clindamycin  Consult Dermatology -Called office [phone] at approx 1030 and again 1100 however provider will not available until 1 pm.  Consult wound care to assist.       Dermatology recommended start IV and topical steroids for Pemphigus Vulgaris.   IV solumedrol 30 mg IV BID x 5 days  Triamcinolone 0.1 % cream BID   For punch biopsy today  If no improvement in 5 days, may need to start IVIG.     Assume derm will see patient today?    Continue steroids today.  May d/c tomorrow     Will ask nursing to contact dermatology. I need a plan/recs     Spoke with Derm on 1/15- awaiting confirmation test of this diagnosis- they said they " would have to day and text me results.     Spoke to Derm on 1/17.  They noted Bx results may not be back until Monday.  Not sure why SNF will not accept until then. Will speak to case management this am.     I have not been informed of bx results.          Paroxysmal atrial fibrillation  No NOAC per cards. Rate controlled       Chronic combined systolic and diastolic heart failure  EF of 25-30%,Euvolemic. Not on diuretics at home.  Continue metoprolol. Resume ACEI/ARB if renal function stable and BP tolerates.       Pressure injury of left heel, unstageable  Apply boot. Wound care consult       Recurrent falls  See above #1.     PT/OT rec: SNF.   Likely not a candidate for anti-coagulation due to falls.        Diffuse Skin Breakdown and Lesions  See above      CKD stage 2 due to type 2 diabetes mellitus  Stable. Repeat labs pending.     With some pre-renal failure. Will hold lasix       Anemia, chronic disease  Obtain iron studies, vitamin B 12, folate level      Dementia without behavioral disturbance  Per patient, history of dementia with visual auditory hallucinations x 2 years. Baseline oriented to self and place but not time.   EKG accelerated Junctional 72. Troponin x1  Lactate normal.  UA no infection. CTH no acute abnormality.   Contributing to falls (Dec 4, Jan 3, and yesterday Jaison 8 x 2 ). All the falls have been unwitnessed as patient lives alone.  Currently mental status at baseline  PT/OT consult  Consulted SW for NH placement.      Type 2 diabetes mellitus with hyperglycemia  Lab Results   Component Value Date    HGBA1C 7.4 (H) 01/09/2020   obtain A1c.   SSI and adjust accordingly,  On prandial ,basal,and SSI at thi stime.    Increasing insulin for solumedrol    Well controlled now.  Will have to cut back insulin once steroids stopped     Elevated sugars this am.  SS. Hopefully can wean or cut back steroids soon     Now that steroids are being cut back- better glucose control. Will cut back on insulin            AICD (automatic cardioverter/defibrillator) present  No current issues.       Essential hypertension  Continue metoprolol. Resume ramipril ? Hydralazine prn.       CAD (coronary artery disease)  Daughter Ayse reports history of MI/stents/AICD/PPM but not sure dates and previous test.   Denies chest pain or shortness of breath  Continue ASA, plavix, metoprolol. Previously on ramipril.        VTE Risk Mitigation (From admission, onward)         Ordered     IP VTE HIGH RISK PATIENT  Once      01/09/20 0815     Place CHANO hose  Until discontinued      01/09/20 0815     Place sequential compression device  Until discontinued      01/09/20 0815                Awaiting NH placement- wont happen over weekend. Will talk to  on Monday       Sammy Rodrigues MD  Department of Hospital Medicine   [facility]

## 2020-01-18 NOTE — ASSESSMENT & PLAN NOTE
"  See physical exam. Patient was seen by Dermatology Dr. Hinton in Sept for similar lesion but not this extensive. Lesion cleared with steroid injections? X 2. Daughter Ayse reports biopsy showed "Dermatitis of unknown origin."   Denies any new antibiotics up untial 12/29 placed on clindamycin for "cellulits" when seen by [facility] [address].   Will hold clindamycin  Consult Dermatology -Called office [phone] at approx 1030 and again 1100 however provider will not available until 1 pm.  Consult wound care to assist.       Dermatology recommended start IV and topical steroids for Pemphigus Vulgaris.   IV solumedrol 30 mg IV BID x 5 days  Triamcinolone 0.1 % cream BID   For punch biopsy today  If no improvement in 5 days, may need to start IVIG.     Assume derm will see patient today?    Continue steroids today.  May d/c tomorrow     Will ask nursing to contact dermatology. I need a plan/recs     Spoke with Derm on 1/15- awaiting confirmation test of this diagnosis- they said they would have to day and text me results.     Spoke to Derm on 1/17.  They noted Bx results may not be back until Monday.  Not sure why SNF will not accept until then. Will speak to case management this am.     I have not been informed of bx results.        "

## 2020-01-18 NOTE — PLAN OF CARE
Problem: Fall Injury Risk  Goal: Absence of Fall and Fall-Related Injury  Outcome: Ongoing, Progressing     Problem: Adult Inpatient Plan of Care  Goal: Plan of Care Review  Outcome: Ongoing, Progressing  Goal: Patient-Specific Goal (Individualization)  Outcome: Ongoing, Progressing  Goal: Absence of Hospital-Acquired Illness or Injury  Outcome: Ongoing, Progressing  Goal: Optimal Comfort and Wellbeing  Outcome: Ongoing, Progressing  Goal: Readiness for Transition of Care  Outcome: Ongoing, Progressing  Goal: Rounds/Family Conference  Outcome: Ongoing, Progressing     Problem: Wound  Goal: Optimal Wound Healing  Outcome: Ongoing, Progressing     Problem: Skin Injury Risk Increased  Goal: Skin Health and Integrity  Outcome: Ongoing, Progressing     Problem: Diabetes Comorbidity  Goal: Blood Glucose Level Within Desired Range  Outcome: Ongoing, Progressing     Problem: Infection  Goal: Infection Symptom Resolution  Outcome: Ongoing, Progressing     Problem: Coping Ineffective  Goal: Effective Coping  Outcome: Ongoing, Progressing     Problem: Self-Care Deficit  Goal: Improved Ability to Complete Activities of Daily Living  Outcome: Ongoing, Progressing

## 2020-01-19 LAB
POCT GLUCOSE: 100 MG/DL (ref 70–110)
POCT GLUCOSE: 120 MG/DL (ref 70–110)
POCT GLUCOSE: 220 MG/DL (ref 70–110)
POCT GLUCOSE: 230 MG/DL (ref 70–110)

## 2020-01-19 PROCEDURE — 25000003 PHARM REV CODE 250: Performed by: NURSE PRACTITIONER

## 2020-01-19 PROCEDURE — 25000003 PHARM REV CODE 250: Performed by: HOSPITALIST

## 2020-01-19 PROCEDURE — 25000003 PHARM REV CODE 250: Performed by: INTERNAL MEDICINE

## 2020-01-19 PROCEDURE — 25000003 PHARM REV CODE 250: Performed by: EMERGENCY MEDICINE

## 2020-01-19 PROCEDURE — 63600175 PHARM REV CODE 636 W HCPCS: Performed by: INTERNAL MEDICINE

## 2020-01-19 PROCEDURE — 21400001 HC TELEMETRY ROOM

## 2020-01-19 PROCEDURE — 94761 N-INVAS EAR/PLS OXIMETRY MLT: CPT

## 2020-01-19 RX ADMIN — SENNOSIDES AND DOCUSATE SODIUM 1 TABLET: 8.6; 5 TABLET ORAL at 09:01

## 2020-01-19 RX ADMIN — INSULIN ASPART 12 UNITS: 100 INJECTION, SOLUTION INTRAVENOUS; SUBCUTANEOUS at 05:01

## 2020-01-19 RX ADMIN — TRIAMCINOLONE ACETONIDE: 1 CREAM TOPICAL at 09:01

## 2020-01-19 RX ADMIN — HYDROXYZINE PAMOATE 25 MG: 25 CAPSULE ORAL at 08:01

## 2020-01-19 RX ADMIN — CARVEDILOL 6.25 MG: 6.25 TABLET, FILM COATED ORAL at 09:01

## 2020-01-19 RX ADMIN — INSULIN ASPART 1 UNITS: 100 INJECTION, SOLUTION INTRAVENOUS; SUBCUTANEOUS at 09:01

## 2020-01-19 RX ADMIN — ACETAMINOPHEN 650 MG: 325 TABLET ORAL at 08:01

## 2020-01-19 RX ADMIN — CLOPIDOGREL BISULFATE 75 MG: 75 TABLET ORAL at 08:01

## 2020-01-19 RX ADMIN — LOSARTAN POTASSIUM 25 MG: 25 TABLET ORAL at 08:01

## 2020-01-19 RX ADMIN — PRAVASTATIN SODIUM 40 MG: 40 TABLET ORAL at 09:01

## 2020-01-19 RX ADMIN — PREDNISONE 20 MG: 20 TABLET ORAL at 08:01

## 2020-01-19 RX ADMIN — PANTOPRAZOLE SODIUM 40 MG: 40 TABLET, DELAYED RELEASE ORAL at 08:01

## 2020-01-19 RX ADMIN — ASPIRIN 325 MG: 325 TABLET, DELAYED RELEASE ORAL at 08:01

## 2020-01-19 RX ADMIN — INSULIN ASPART 12 UNITS: 100 INJECTION, SOLUTION INTRAVENOUS; SUBCUTANEOUS at 01:01

## 2020-01-19 RX ADMIN — CARVEDILOL 6.25 MG: 6.25 TABLET, FILM COATED ORAL at 08:01

## 2020-01-19 RX ADMIN — INSULIN ASPART 12 UNITS: 100 INJECTION, SOLUTION INTRAVENOUS; SUBCUTANEOUS at 08:01

## 2020-01-19 RX ADMIN — TRIAMCINOLONE ACETONIDE: 1 CREAM TOPICAL at 08:01

## 2020-01-19 RX ADMIN — INSULIN ASPART 2 UNITS: 100 INJECTION, SOLUTION INTRAVENOUS; SUBCUTANEOUS at 01:01

## 2020-01-19 NOTE — PROGRESS NOTES
[facility] Hospital Medicine  Progress Note    Patient Name: Ruthann Doe  MRN: [id number]  Patient Class: IP- Inpatient   Admission Date: 1/8/2020  Length of Stay: 10 days  Attending Physician: Sammy Perales MD  Primary Care Provider: Jesse Partida MD        Subjective:     Principal Problem:Pemphigus vulgaris        HPI:  Mrs. Doe is a 80 yo female with significant hypertension, hyperlipidemia, diabetes type 2, dementia, PPM/AICD?, CAD status post MI, chronic systolic diastolic heart failure, and pulmonary hypertension who was brought by Daughter Ayse to hospital for worsen dementia, recurrent falls and need for NHP. Patient lives alone. Daughter Ayse have been estranged from patient for over 10 years but stepped in few years ago to assist. Daughter Ayse reports patient dementia with visual /aduitory hallucinations for over 2 years and consisting of dead people and cats for 2 years. Baseline ambulates with walker up until 10 days ago.    Patient also found to have diffuse body lesions consisting for blisters and scab over lesion through out body sparring the buttock and perineum. Patient had similar lesions in Sept however only on torso which cleared up with steroid injections in Sept and October. By end of November, lesions completely resolved. 10 days ago patient was brought to Urgent Care on [address] then told had cellulitis prescribed clindamycin 300 mg TID.    CXR no acute process. CTH  No acute intracranial abnormality. Afebrile and no leukocytosis. UA no evidence of infection.     Overview/Hospital Course:  Mrs. Doe is a 80 yo female with significant hypertension, hyperlipidemia, diabetes type 2, dementia, PPM/AICD?, CAD status post MI, chronic systolic diastolic heart failure, and pulmonary hypertension who was brought by Daughter Ayse to hospital for worsen dementia, recurrent falls and need for NHP. Patient lives alone. Daughter Ayse have been estranged from  patient for over 10 years but stepped in few years ago to assist. Daughter Ayse reports patient dementia with visual /aduitory hallucinations for over 2 years and consisting of dead people and cats for 2 years. Baseline ambulates with walker up until 10 days ago.    Patient also found to have diffuse body lesions consisting for blisters and scab over lesion through out body sparring the buttock and perineum. Patient had similar lesions in Sept however only on torso which cleared up with steroid injections in Sept and October. By end of November, lesions completely resolved. 10 days ago patient was brought to Urgent Care on [facility] then told had cellulitis prescribed clindamycin 300 mg TID.    CXR no acute process. CTH  No acute intracranial abnormality. Afebrile and no leukocytosis. UA no evidence of infection.  PT/OT evaluated the patient and recommended SNF. Patient was found to be in A-fib and cards consulted. No NOAC due to frequent falls and non-compliance.  Dermatology was consulted and diagnosed patient with pemphigus vulgaris.  The patient was started on IV steroids and completed a course of 5 days.  Patient was accepted at [facility].  Patient's punch bx results held the discharge.       Interval History: No n new issues     Review of Systems   Constitutional: Negative for activity change.   HENT: Negative for congestion.    Respiratory: Negative for chest tightness and shortness of breath.    Cardiovascular: Negative for chest pain.   Genitourinary: Negative for difficulty urinating.   Musculoskeletal: Negative for arthralgias.   Psychiatric/Behavioral: Negative for agitation.     Objective:     Vital Signs (Most Recent):  Temp: 97.7 °F (36.5 °C) (01/19/20 0809)  Pulse: 76 (01/19/20 0809)  Resp: 16 (01/19/20 0809)  BP: (!) 140/63 (01/19/20 0809)  SpO2: 100 % (01/19/20 0809) Vital Signs (24h Range):  Temp:  [97.4 °F (36.3 °C)-98.7 °F (37.1 °C)] 97.7 °F (36.5 °C)  Pulse:  [75-93] 76  Resp:  [16-18]  "16  SpO2:  [96 %-100 %] 100 %  BP: (101-149)/(55-76) 140/63     Weight: 61 kg (134 lb 7.7 oz)  Body mass index is 23.08 kg/m².    Intake/Output Summary (Last 24 hours) at 1/19/2020 1029  Last data filed at 1/19/2020 0900  Gross per 24 hour   Intake 840 ml   Output --   Net 840 ml      Physical Exam   Constitutional: She is oriented to person, place, and time. She appears well-developed and well-nourished.   HENT:   Head: Normocephalic and atraumatic.   Neurological: She is alert and oriented to person, place, and time.   Skin: Skin is warm and dry.   Psychiatric: She has a normal mood and affect. Her behavior is normal.   Nursing note and vitals reviewed.      Significant Labs: BMP: No results for input(s): GLU, NA, K, CL, CO2, BUN, CREATININE, CALCIUM, MG in the last 48 hours.  CBC: No results for input(s): WBC, HGB, HCT, PLT in the last 48 hours.    Significant Imaging:       Assessment/Plan:      * Pemphigus vulgaris    See physical exam. Patient was seen by Dermatology Dr. Hinton in Sept for similar lesion but not this extensive. Lesion cleared with steroid injections? X 2. Daughter Ayse reports biopsy showed "Dermatitis of unknown origin."   Denies any new antibiotics up untial 12/29 placed on clindamycin for "cellulits" when seen by [facility] [address].   Will hold clindamycin  Consult Dermatology -Called office [phone] at approx 1030 and again 1100 however provider will not available until 1 pm.  Consult wound care to assist.       Dermatology recommended start IV and topical steroids for Pemphigus Vulgaris.   IV solumedrol 30 mg IV BID x 5 days  Triamcinolone 0.1 % cream BID   For punch biopsy today  If no improvement in 5 days, may need to start IVIG.     Assume derm will see patient today?    Continue steroids today.  May d/c tomorrow     Will ask nursing to contact dermatology. I need a plan/recs     Spoke with Derm on 1/15- awaiting confirmation test of this diagnosis- they said they would have to day and " text me results.     Spoke to Derm on 1/17.  They noted Bx results may not be back until Monday.  Not sure why SNF will not accept until then. Will speak to case management this am.     I have not been informed of bx results.          Paroxysmal atrial fibrillation  No NOAC per cards. Rate controlled       Chronic combined systolic and diastolic heart failure  EF of 25-30%,Euvolemic. Not on diuretics at home.  Continue metoprolol. Resume ACEI/ARB if renal function stable and BP tolerates.       Pressure injury of left heel, unstageable  Apply boot. Wound care consult       Recurrent falls  See above #1.     PT/OT rec: SNF.   Likely not a candidate for anti-coagulation due to falls.        Diffuse Skin Breakdown and Lesions  See above      CKD stage 2 due to type 2 diabetes mellitus  Stable. Repeat labs pending.     With some pre-renal failure. Will hold lasix       Anemia, chronic disease  Obtain iron studies, vitamin B 12, folate level      Dementia without behavioral disturbance  Per patient, history of dementia with visual auditory hallucinations x 2 years. Baseline oriented to self and place but not time.   EKG accelerated Junctional 72. Troponin x1  Lactate normal.  UA no infection. CTH no acute abnormality.   Contributing to falls (Dec 4, Jan 3, and yesterday Jaison 8 x 2 ). All the falls have been unwitnessed as patient lives alone.  Currently mental status at baseline  PT/OT consult  Consulted  for NH placement.      Type 2 diabetes mellitus with hyperglycemia  Lab Results   Component Value Date    HGBA1C 7.4 (H) 01/09/2020   obtain A1c.   SSI and adjust accordingly,  On prandial ,basal,and SSI at [facility].    Increasing insulin for solumedrol    Well controlled now.  Will have to cut back insulin once steroids stopped     Elevated sugars this am.  SS. Hopefully can wean or cut back steroids soon     Now that steroids are being cut back- better glucose control. Will cut back on insulin           AICD  (automatic cardioverter/defibrillator) present  No current issues.       Essential hypertension  Continue metoprolol. Resume ramipril ? Hydralazine prn.       CAD (coronary artery disease)  Daughter Ayse reports history of MI/stents/AICD/PPM but not sure dates and previous test.   Denies chest pain or shortness of breath  Continue ASA, plavix, metoprolol. Previously on ramipril.        VTE Risk Mitigation (From admission, onward)         Ordered     IP VTE HIGH RISK PATIENT  Once      01/09/20 0815     Place CHANO hose  Until discontinued      01/09/20 0815     Place sequential compression device  Until discontinued      01/09/20 0815                Hopefully to NH tomorrow. Jordin discuss with .     Sammy Rodrigues MD  Department of Hospital Medicine   [facility]

## 2020-01-19 NOTE — PLAN OF CARE
Problem: Fall Injury Risk  Goal: Absence of Fall and Fall-Related Injury  1/19/2020 0104 by Liana House RN  Outcome: Ongoing, Not Progressing  1/19/2020 0104 by Liana House RN  Outcome: Ongoing, Progressing     Problem: Adult Inpatient Plan of Care  Goal: Plan of Care Review  1/19/2020 0104 by Liana House RN  Outcome: Ongoing, Not Progressing  1/19/2020 0104 by Liana House RN  Outcome: Ongoing, Progressing  Goal: Patient-Specific Goal (Individualization)  Outcome: Ongoing, Not Progressing  Goal: Absence of Hospital-Acquired Illness or Injury  1/19/2020 0104 by Liana House RN  Outcome: Ongoing, Not Progressing  1/19/2020 0104 by Liana House RN  Outcome: Ongoing, Progressing  Goal: Optimal Comfort and Wellbeing  1/19/2020 0104 by Liana House RN  Outcome: Ongoing, Not Progressing  1/19/2020 0104 by Liana House RN  Outcome: Ongoing, Progressing  Goal: Readiness for Transition of Care  1/19/2020 0104 by Liana House RN  Outcome: Ongoing, Not Progressing  1/19/2020 0104 by Liana House RN  Outcome: Ongoing, Progressing  Goal: Rounds/Family Conference  1/19/2020 0104 by Liana House RN  Outcome: Ongoing, Not Progressing  1/19/2020 0104 by Liana House RN  Outcome: Ongoing, Progressing     Problem: Wound  Goal: Optimal Wound Healing  1/19/2020 0104 by Liana House, SHANTELL  Outcome: Ongoing, Not Progressing  1/19/2020 0104 by Liana House RN  Outcome: Ongoing, Progressing     Problem: Skin Injury Risk Increased  Goal: Skin Health and Integrity  1/19/2020 0104 by Liana House RN  Outcome: Ongoing, Not Progressing  1/19/2020 0104 by Liana House RN  Outcome: Ongoing, Progressing     Problem: Diabetes Comorbidity  Goal: Blood Glucose Level Within Desired Range  1/19/2020 0104 by Liana House RN  Outcome: Ongoing, Not Progressing  1/19/2020 0104 by Liana House RN  Outcome: Ongoing, Progressing     Problem:  Infection  Goal: Infection Symptom Resolution  1/19/2020 0104 by Liana House RN  Outcome: Ongoing, Not Progressing  1/19/2020 0104 by Liana House RN  Outcome: Ongoing, Progressing     Problem: Coping Ineffective  Goal: Effective Coping  1/19/2020 0104 by Liana House RN  Outcome: Ongoing, Not Progressing  1/19/2020 0104 by Liana House RN  Outcome: Ongoing, Progressing     Problem: Self-Care Deficit  Goal: Improved Ability to Complete Activities of Daily Living  1/19/2020 0104 by Liana House RN  Outcome: Ongoing, Not Progressing  1/19/2020 0104 by Liana House RN  Outcome: Ongoing, Progressing

## 2020-01-19 NOTE — PLAN OF CARE
Problem: Fall Injury Risk  Goal: Absence of Fall and Fall-Related Injury  Outcome: Ongoing, Progressing     Problem: Adult Inpatient Plan of Care  Goal: Plan of Care Review  Outcome: Ongoing, Progressing  Goal: Absence of Hospital-Acquired Illness or Injury  Outcome: Ongoing, Progressing  Goal: Optimal Comfort and Wellbeing  Outcome: Ongoing, Progressing  Goal: Readiness for Transition of Care  Outcome: Ongoing, Progressing  Goal: Rounds/Family Conference  Outcome: Ongoing, Progressing     Problem: Wound  Goal: Optimal Wound Healing  Outcome: Ongoing, Progressing     Problem: Skin Injury Risk Increased  Goal: Skin Health and Integrity  Outcome: Ongoing, Progressing     Problem: Diabetes Comorbidity  Goal: Blood Glucose Level Within Desired Range  Outcome: Ongoing, Progressing     Problem: Infection  Goal: Infection Symptom Resolution  Outcome: Ongoing, Progressing     Problem: Coping Ineffective  Goal: Effective Coping  Outcome: Ongoing, Progressing     Problem: Self-Care Deficit  Goal: Improved Ability to Complete Activities of Daily Living  Outcome: Ongoing, Progressing

## 2020-01-19 NOTE — SUBJECTIVE & OBJECTIVE
Interval History: No n new issues     Review of Systems   Constitutional: Negative for activity change.   HENT: Negative for congestion.    Respiratory: Negative for chest tightness and shortness of breath.    Cardiovascular: Negative for chest pain.   Genitourinary: Negative for difficulty urinating.   Musculoskeletal: Negative for arthralgias.   Psychiatric/Behavioral: Negative for agitation.     Objective:     Vital Signs (Most Recent):  Temp: 97.7 °F (36.5 °C) (01/19/20 0809)  Pulse: 76 (01/19/20 0809)  Resp: 16 (01/19/20 0809)  BP: (!) 140/63 (01/19/20 0809)  SpO2: 100 % (01/19/20 0809) Vital Signs (24h Range):  Temp:  [97.4 °F (36.3 °C)-98.7 °F (37.1 °C)] 97.7 °F (36.5 °C)  Pulse:  [75-93] 76  Resp:  [16-18] 16  SpO2:  [96 %-100 %] 100 %  BP: (101-149)/(55-76) 140/63     Weight: 61 kg (134 lb 7.7 oz)  Body mass index is 23.08 kg/m².    Intake/Output Summary (Last 24 hours) at 1/19/2020 1029  Last data filed at 1/19/2020 0900  Gross per 24 hour   Intake 840 ml   Output --   Net 840 ml      Physical Exam   Constitutional: She is oriented to person, place, and time. She appears well-developed and well-nourished.   HENT:   Head: Normocephalic and atraumatic.   Neurological: She is alert and oriented to person, place, and time.   Skin: Skin is warm and dry.   Psychiatric: She has a normal mood and affect. Her behavior is normal.   Nursing note and vitals reviewed.      Significant Labs: BMP: No results for input(s): GLU, NA, K, CL, CO2, BUN, CREATININE, CALCIUM, MG in the last 48 hours.  CBC: No results for input(s): WBC, HGB, HCT, PLT in the last 48 hours.    Significant Imaging:

## 2020-01-19 NOTE — PROGRESS NOTES
"[facility]  Adult Nutrition  Progress Note    SUMMARY       Recommendations     1. Current diet and intake adequate to meet estimated needs.     Goals: Meet > 85% EEN daily  Nutrition Goal Status: goal met  Communication of RD Recs: (POC)    Reason for Assessment    Reason For Assessment: length of stay  Diagnosis: (Pemphigus vulgaris)  Relevant Medical History: HTN, HLD, CAD, HF, Dementia, DM  Interdisciplinary Rounds: did not attend    General Information Comments: Pt with good appetite, eating 100% of meals. Denies n/v/chewing or swallowing issues. Reports wt stable PTA. NFPE : age appropriate LBM, fat WDL.     Nutrition Discharge Plannin ADA/Cardiac diet    Nutrition Risk Screen    Nutrition Risk Screen: no indicators present    Nutrition/Diet History    Spiritual, Cultural Beliefs, Gnosticism Practices, Values that Affect Care: no  Factors Affecting Nutritional Intake: None identified at this time    Anthropometrics    Temp: 97.2 °F (36.2 °C)  Height Method: Stated  Height: 5' 4" (162.6 cm)  Height (inches): 64 in  Weight Method: Bed Scale  Weight: 61 kg (134 lb 7.7 oz)  Weight (lb): 134.48 lb  Ideal Body Weight (IBW), Female: 120 lb  % Ideal Body Weight, Female (lb): 112.07 %  BMI (Calculated): 23.1       Lab/Procedures/Meds    Pertinent Labs Reviewed: reviewed  Pertinent Medications Reviewed: reviewed    Estimated/Assessed Needs    Weight Used For Calorie Calculations: 61 kg (134 lb 7.7 oz)  Energy Calorie Requirements (kcal): 1300 kcal (x 1.25)  Energy Need Method: [place]- Jamesor  Protein Requirements: 61-73g  Weight Used For Protein Calculations: 61 kg (134 lb 7.7 oz)     Estimated Fluid Requirement Method: RDA Method  RDA Method (mL): 1300  CHO Requirement: 130g      Nutrition Prescription Ordered    Current Diet Order:  ADA/Cardiac    Evaluation of Received Nutrient/Fluid Intake    I/O: reviewed  Energy Calories Required: meeting needs  Protein Required: meeting " needs  Fluid Required: (per MD)  Tolerance: tolerating  % Intake of Estimated Energy Needs: 75 - 100 %  % Meal Intake: 75 - 100 %    Nutrition Risk    Level of Risk/Frequency of Follow-up: (1 x week)     Assessment and Plan    Nutrition Problem  Decreased need for sodium    Related to (etiology):   Fluid retention     Signs and Symptoms (as evidenced by):   HTN, HF Dx    Interventions  Sodium controlled DIet    Nutrition Diagnosis Status:   New     Monitor and Evaluation    Food and Nutrient Intake: energy intake, food and beverage intake  Food and Nutrient Adminstration: diet order  Anthropometric Measurements: weight, weight change  Biochemical Data, Medical Tests and Procedures: electrolyte and renal panel, glucose/endocrine profile  Nutrition-Focused Physical Findings: overall appearance     Malnutrition Assessment        Subcutaneous Fat Loss (Final Summary): well nourished  Muscle Loss Evaluation (Final Summary): well nourished(age appropriate)         Nutrition Follow-Up    RD Follow-up?: Yes

## 2020-01-20 VITALS
BODY MASS INDEX: 22.96 KG/M2 | OXYGEN SATURATION: 95 % | RESPIRATION RATE: 18 BRPM | DIASTOLIC BLOOD PRESSURE: 55 MMHG | HEIGHT: 64 IN | HEART RATE: 78 BPM | WEIGHT: 134.5 LBS | SYSTOLIC BLOOD PRESSURE: 100 MMHG | TEMPERATURE: 98 F

## 2020-01-20 PROBLEM — N18.2 CKD STAGE 2 DUE TO TYPE 2 DIABETES MELLITUS: Status: RESOLVED | Noted: 2020-01-09 | Resolved: 2020-01-20

## 2020-01-20 PROBLEM — L10.0 PEMPHIGUS VULGARIS: Status: RESOLVED | Noted: 2020-01-09 | Resolved: 2020-01-20

## 2020-01-20 PROBLEM — R23.8 SKIN BREAKDOWN: Status: RESOLVED | Noted: 2020-01-09 | Resolved: 2020-01-20

## 2020-01-20 PROBLEM — E11.22 CKD STAGE 2 DUE TO TYPE 2 DIABETES MELLITUS: Status: RESOLVED | Noted: 2020-01-09 | Resolved: 2020-01-20

## 2020-01-20 LAB
POCT GLUCOSE: 112 MG/DL (ref 70–110)
POCT GLUCOSE: 201 MG/DL (ref 70–110)
POCT GLUCOSE: 221 MG/DL (ref 70–110)

## 2020-01-20 PROCEDURE — 25000003 PHARM REV CODE 250: Performed by: EMERGENCY MEDICINE

## 2020-01-20 PROCEDURE — 86580 TB INTRADERMAL TEST: CPT | Performed by: INTERNAL MEDICINE

## 2020-01-20 PROCEDURE — 25000003 PHARM REV CODE 250: Performed by: INTERNAL MEDICINE

## 2020-01-20 PROCEDURE — 25000003 PHARM REV CODE 250: Performed by: HOSPITALIST

## 2020-01-20 PROCEDURE — 25000003 PHARM REV CODE 250: Performed by: NURSE PRACTITIONER

## 2020-01-20 PROCEDURE — 30200315 PPD INTRADERMAL TEST REV CODE 302: Performed by: INTERNAL MEDICINE

## 2020-01-20 RX ORDER — LOSARTAN POTASSIUM 25 MG/1
25 TABLET ORAL DAILY
Qty: 90 TABLET | Refills: 3 | Status: SHIPPED | OUTPATIENT
Start: 2020-01-21 | End: 2021-01-20

## 2020-01-20 RX ORDER — PANTOPRAZOLE SODIUM 40 MG/1
40 TABLET, DELAYED RELEASE ORAL DAILY
Qty: 30 TABLET | Refills: 11
Start: 2020-01-21 | End: 2021-01-20

## 2020-01-20 RX ADMIN — INSULIN ASPART 12 UNITS: 100 INJECTION, SOLUTION INTRAVENOUS; SUBCUTANEOUS at 08:01

## 2020-01-20 RX ADMIN — ASPIRIN 325 MG: 325 TABLET, DELAYED RELEASE ORAL at 08:01

## 2020-01-20 RX ADMIN — PANTOPRAZOLE SODIUM 40 MG: 40 TABLET, DELAYED RELEASE ORAL at 09:01

## 2020-01-20 RX ADMIN — CARVEDILOL 6.25 MG: 6.25 TABLET, FILM COATED ORAL at 08:01

## 2020-01-20 RX ADMIN — TRIAMCINOLONE ACETONIDE: 1 CREAM TOPICAL at 08:01

## 2020-01-20 RX ADMIN — SENNOSIDES AND DOCUSATE SODIUM 1 TABLET: 8.6; 5 TABLET ORAL at 08:01

## 2020-01-20 RX ADMIN — TUBERCULIN PURIFIED PROTEIN DERIVATIVE 5 UNITS: 5 INJECTION, SOLUTION INTRADERMAL at 11:01

## 2020-01-20 RX ADMIN — INSULIN ASPART 12 UNITS: 100 INJECTION, SOLUTION INTRAVENOUS; SUBCUTANEOUS at 04:01

## 2020-01-20 RX ADMIN — LOSARTAN POTASSIUM 25 MG: 25 TABLET ORAL at 08:01

## 2020-01-20 RX ADMIN — CLOPIDOGREL BISULFATE 75 MG: 75 TABLET ORAL at 08:01

## 2020-01-20 RX ADMIN — INSULIN ASPART 12 UNITS: 100 INJECTION, SOLUTION INTRAVENOUS; SUBCUTANEOUS at 11:01

## 2020-01-20 NOTE — PLAN OF CARE
01/20/20 1000   Post-Acute Status   Post-Acute Authorization Placement   Post-Acute Placement Status   (Awaiting nursing home orders)   Discharge Delays (!) Patient and Family Barriers  (Family to sign paperwork today)   Order received to discharge patient.  Need plan of care for transfer.  TN requested from MD.  Also requested PPD as this has not been done.    1039:  Orders received for NH placement and PPD.  Plan of care sent to [facility].      1115:  Message received from Molly at [facility] stating that Mr Demarco and pt's daughter visited [facility] but did not sign the paperwork.    1130:  TN called Mr Demarco who stated they are now at [facility] and they were told by the admissions coordinator that she can accept the patient.  TN explained that they previously denied the patient.  TN asked to speak with her but he was no longer in her presence therefor he agreed to go back inside and call from her office.  Awaiting for call back.    1155:  PPD received and sent to [facility] along with complete referral.

## 2020-01-20 NOTE — DISCHARGE SUMMARY
[facility] Hospital Medicine  Discharge Summary      Patient Name: Ruthann Doe  MRN: [id number]  Admission Date: 1/8/2020  Hospital Length of Stay: 11 days  Discharge Date and Time:  01/20/2020 10:46 AM  Attending Physician: Sammy Perales MD   Discharging Provider: Sammy Perales MD  Primary Care Provider: Jesse Partida MD      HPI:   Mrs. Doe is a 78 yo female with significant hypertension, hyperlipidemia, diabetes type 2, dementia, PPM/AICD?, CAD status post MI, chronic systolic diastolic heart failure, and pulmonary hypertension who was brought by Daughter Ayse to hospital for worsen dementia, recurrent falls and need for NHP. Patient lives alone. Daughter Ayse have been estranged from patient for over 10 years but stepped in few years ago to assist. Daughter Ayse reports patient dementia with visual /aduitory hallucinations for over 2 years and consisting of dead people and cats for 2 years. Baseline ambulates with walker up until 10 days ago.    Patient also found to have diffuse body lesions consisting for blisters and scab over lesion through out body sparring the buttock and perineum. Patient had similar lesions in Sept however only on torso which cleared up with steroid injections in Sept and October. By end of November, lesions completely resolved. 10 days ago patient was brought to Urgent Care on [facility] then told had cellulitis prescribed clindamycin 300 mg TID.    CXR no acute process. CTH  No acute intracranial abnormality. Afebrile and no leukocytosis. UA no evidence of infection.     * No surgery found *      Hospital Course:   Mrs. Doe is a 78 yo female with significant hypertension, hyperlipidemia, diabetes type 2, dementia, PPM/AICD?, CAD status post MI, chronic systolic diastolic heart failure, and pulmonary hypertension who was brought by Daughter Ayse to hospital for worsen dementia, recurrent falls and need for NHP. Patient lives alone.  Daughter Ayse have been estranged from patient for over 10 years but stepped in few years ago to assist. Daughter Ayse reports patient dementia with visual /aduitory hallucinations for over 2 years and consisting of dead people and cats for 2 years. Baseline ambulates with walker up until 10 days ago.    Patient also found to have diffuse body lesions consisting for blisters and scab over lesion through out body sparring the buttock and perineum. Patient had similar lesions in Sept however only on torso which cleared up with steroid injections in Sept and October. By end of November, lesions completely resolved. 10 days ago patient was brought to Urgent Care on [facility] then told had cellulitis prescribed clindamycin 300 mg TID.    CXR no acute process. CTH  No acute intracranial abnormality. Afebrile and no leukocytosis. UA no evidence of infection.  PT/OT evaluated the patient and recommended SNF. Patient was found to be in A-fib and cards consulted. No NOAC due to frequent falls and non-compliance.  Dermatology was consulted and diagnosed patient with pemphigus vulgaris.  The patient was started on IV steroids and completed a course of 5 days.  Patient was accepted at [facility]. I spoke with dermatology and nothing more to do other than follow up with them and social workers are aware.  The patient will be discharged to [facility] today. Activity as tolerated. Diet low Na, ADA 1800 jocy diet. Follow up with PCP in one week with derm and cards as well.      Consults:   Consults (From admission, onward)        Status Ordering Provider     Inpatient consult to Cardiology  Once     Provider:  Osmar Pastor MD    Completed WILFRIDO CAMPBELL     Inpatient consult to Dermatology  Once     Provider:  Vinod Hinton MD    Acknowledged CLIFFORD BRO     Inpatient consult to Palliative Care  Once     Provider:  Alem Pryor NP    Completed CLIFFORD BRO     Inpatient consult to Social Work  Once      Provider:  (Not yet assigned)    Completed KASHMIR SALAZAR     Inpatient consult to Social Work  Once     Provider:  (Not yet assigned)    Completed SHAI DORSEY          No new Assessment & Plan notes have been filed under this hospital service since the last note was generated.  Service: Hospital Medicine    Final Active Diagnoses:    Diagnosis Date Noted POA    Paroxysmal atrial fibrillation [I48.0] 01/11/2020 Yes    Dementia without behavioral disturbance [F03.90] 01/09/2020 Yes    Anemia, chronic disease [D63.8] 01/09/2020 Yes    Recurrent falls [R29.6] 01/09/2020 Not Applicable    Pressure injury of left heel, unstageable [L89.620] 01/09/2020 Yes    Chronic combined systolic and diastolic heart failure [I50.42] 01/09/2020 Yes    Noncompliance with medication regimen [Z91.14] 01/21/2018 Not Applicable    Type 2 diabetes mellitus with hyperglycemia [E11.65] 01/20/2018 Yes    Myocardial infarction, old [I25.2] 11/11/2013 Not Applicable    AICD (automatic cardioverter/defibrillator) present [Z95.810] 05/24/2013 Yes    CAD (coronary artery disease) [I25.10] 10/01/2012 Yes    Essential hypertension [I10] 10/01/2012 Yes      Problems Resolved During this Admission:    Diagnosis Date Noted Date Resolved POA    PRINCIPAL PROBLEM:  Pemphigus vulgaris [L10.0] 01/09/2020 01/20/2020 Yes    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    Specialty:  Internal Medicine  Contact information:  [address]  [address]  [place] [address] [phone]             Shai Dorsey MD On 1/22/2020.    Specialties:  Cardiology, INTERVENTIONAL CARDIOLOGY  Why:  @9:00am, for Cardiology follow  up  Contact information:  [address]  [address]  [facility]  [phone]             Jesse Partida MD In 1 week.    Specialty:  Internal Medicine  Contact information:  [address]  [address]  [place] [address]  [phone]                 Patient Instructions:   No discharge procedures on file.    Significant Diagnostic Studies:     Pending Diagnostic Studies:     Procedure Component Value Units Date/Time    EKG 12-lead [[id number]]     Order Status:  Sent Lab Status:  No result          Medications:  Reconciled Home Medications:      Medication List      START taking these medications    losartan 25 MG tablet  Commonly known as:  COZAAR  Take 1 tablet (25 mg total) by mouth once daily.  Start taking on:  January 21, 2020     pantoprazole 40 MG tablet  Commonly known as:  PROTONIX  Take 1 tablet (40 mg total) by mouth once daily.  Start taking on:  January 21, 2020        CONTINUE taking these medications    aspirin 325 MG EC tablet  Commonly known as:  ECOTRIN  Take 325 mg by mouth once daily.     clopidogrel 75 mg tablet  Commonly known as:  PLAVIX  Take 1 tablet (75 mg total) by mouth once daily.     metFORMIN 1000 MG tablet  Commonly known as:  GLUCOPHAGE  Take 1,000 mg by mouth 2 (two) times daily with meals.     metoprolol succinate 25 MG 24 hr tablet  Commonly known as:  TOPROL-XL  TAKE 1 TABLET BY MOUTH TWICE DAILY     multivitamin capsule  Take 1 capsule by mouth once daily.     pravastatin 40 MG tablet  Commonly known as:  PRAVACHOL     Tradjenta 5 mg Tab tablet  Generic drug:  linaGLIPtin  Take 5 mg by mouth once daily.     vitamin D 1000 units Tab  Commonly known as:  VITAMIN D3  Take 1,000 mg by mouth once daily.        STOP taking these medications    clindamycin 300 MG capsule  Commonly known as:  CLEOCIN     glimepiride 1 MG tablet  Commonly known as:  AMARYL     lidocaine HCl 2% 2 % Soln  Commonly known as:  Lidocaine Viscous     predniSONE 5 MG tablet  Commonly known as:   DELTASONE     ramipril 2.5 MG capsule  Commonly known as:  ALTACE     tiZANidine 2 MG tablet  Commonly known as:  ZANAFLEX            Indwelling Lines/Drains at time of discharge:   Lines/Drains/Airways     None                 Time spent on the discharge of patient > 30 minutes  Patient was seen and examined on the date of discharge and determined to be suitable for discharge.         Sammy Rodrigues MD  Department of Hospital Medicine  [facility]

## 2020-01-20 NOTE — PLAN OF CARE
[facility]     Department of Hospital Medicine     [address] [place] [address]     [phone] after hours  [phone] fax       NURSING HOME ORDERS    01/20/2020    Admit to Nursing Home:  Regular [facility]                                                Diagnoses: debility     Active Hospital Problems    Diagnosis  POA    Paroxysmal atrial fibrillation [I48.0]  Yes    Dementia without behavioral disturbance [F03.90]  Yes    Anemia, chronic disease [D63.8]  Yes    Recurrent falls [R29.6]  Not Applicable    Pressure injury of left heel, unstageable [L89.620]  Yes    Chronic combined systolic and diastolic heart failure [I50.42]  Yes    Noncompliance with medication regimen [Z91.14]  Not Applicable    Type 2 diabetes mellitus with hyperglycemia [E11.65]  Yes    Myocardial infarction, old [I25.2]  Not Applicable    AICD (automatic cardioverter/defibrillator) present [Z95.810]  Yes    CAD (coronary artery disease) [I25.10]  Yes    Essential hypertension [I10]  Yes      Resolved Hospital Problems    Diagnosis Date Resolved POA    *Pemphigus vulgaris [L10.0] 01/20/2020 Yes     Priority: 1 - High    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, 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] 01/20/2020 Yes    Diffuse Skin Breakdown and Lesions [L90.9] 01/20/2020 Yes    Palliative care encounter [Z51.5] 01/20/2020 Not Applicable       Patient is homebound due to:  Pemphigus vulgaris    Allergies:  Review of patient's allergies indicates:   Allergen Reactions    Sulfa (sulfonamide antibiotics) Hives       Vitals:       Routine    Diet: Low NA, ADA 1800 jocy diet     Acitivities:     - Up in a chair each morning as tolerated       LABS:  Per facility protocol    Nursing Precautions:       - Aspiration precautions:      - Fall precautions per nursing home protocol     - Decubitus precautions:        -  for positioning   - Pressure reducing foam mattress   - Turn patient  every two hours. Use wedge pillows to anchor patient    CONSULTS:      Physical Therapy to evaluate and treat     Occupational Therapy to evaluate and treat                                      DIABETES CARE:     Check blood sugar:         Fingerstick blood sugar AC and HS   Fingerstick blood sugar every 6 hours if unable to eat      Report CBG < 60 or > 400 to physician.                                          Insulin Sliding Scale          Glucose  Novolog Insulin Subcutaneous        0 - 60   Orange juice or glucose tablet, hold insulin      No insulin   201-250  2 units   251-300  4 units   301-350  6 units   351-400  8 units   >400   10 units then call physician      Medications: Discontinue all previous medication orders, if any. See new list below.     Maddie Doeevlin Osvaldo   Home Medication Instructions KLAUDIA:[id number]    Printed on:01/20/20 1046   Medication Information                      aspirin (ECOTRIN) 325 MG EC tablet  Take 325 mg by mouth once daily.               clopidogrel (PLAVIX) 75 mg tablet  Take 1 tablet (75 mg total) by mouth once daily.             linaGLIPtin (TRADJENTA) 5 mg Tab tablet  Take 5 mg by mouth once daily.             losartan (COZAAR) 25 MG tablet  Take 1 tablet (25 mg total) by mouth once daily.             metFORMIN (GLUCOPHAGE) 1000 MG tablet  Take 1,000 mg by mouth 2 (two) times daily with meals.             metoprolol succinate (TOPROL-XL) 25 MG 24 hr tablet  TAKE 1 TABLET BY MOUTH TWICE DAILY             multivitamin capsule  Take 1 capsule by mouth once daily.               pantoprazole (PROTONIX) 40 MG tablet  Take 1 tablet (40 mg total) by mouth once daily.             pravastatin (PRAVACHOL) 40 MG tablet               vitamin D 185 MG Tab  Take 1,000 mg by mouth once daily.                         _________________________________  aSmmy Rodrigues MD  01/20/2020

## 2020-01-20 NOTE — NURSING
Bedside report received from SHANTELL Davis. Patient awake and alert. NAD noted at this time. All safety precautions in place. Will continue to monitor.

## 2020-01-20 NOTE — NURSING
Called and tried to give report to Alida [phone].   1535- Called and tried to give report again. Left message with  to return call. # left  1720- Follow up call to transport for  time  1733- Report called to ELVIN Guerar [phone].

## 2020-01-20 NOTE — PROGRESS NOTES
[facility] Hospital Medicine  Progress Note    Patient Name: Ruthann Doe  MRN: [id number]  Patient Class: IP- Inpatient   Admission Date: 1/8/2020  Length of Stay: 11 days  Attending Physician: Sammy Perales MD  Primary Care Provider: Jesse Partida MD        Subjective:     Principal Problem:Pemphigus vulgaris        HPI:  Mrs. Doe is a 80 yo female with significant hypertension, hyperlipidemia, diabetes type 2, dementia, PPM/AICD?, CAD status post MI, chronic systolic diastolic heart failure, and pulmonary hypertension who was brought by Daughter Ayse to hospital for worsen dementia, recurrent falls and need for NHP. Patient lives alone. Daughter Ayse have been estranged from patient for over 10 years but stepped in few years ago to assist. Daughter Ayse reports patient dementia with visual /aduitory hallucinations for over 2 years and consisting of dead people and cats for 2 years. Baseline ambulates with walker up until 10 days ago.    Patient also found to have diffuse body lesions consisting for blisters and scab over lesion through out body sparring the buttock and perineum. Patient had similar lesions in Sept however only on torso which cleared up with steroid injections in Sept and October. By end of November, lesions completely resolved. 10 days ago patient was brought to Urgent Care on [address] then told had cellulitis prescribed clindamycin 300 mg TID.    CXR no acute process. CTH  No acute intracranial abnormality. Afebrile and no leukocytosis. UA no evidence of infection.     Overview/Hospital Course:  Mrs. Doe is a 80 yo female with significant hypertension, hyperlipidemia, diabetes type 2, dementia, PPM/AICD?, CAD status post MI, chronic systolic diastolic heart failure, and pulmonary hypertension who was brought by Daughter Ayse to hospital for worsen dementia, recurrent falls and need for NHP. Patient lives alone. Daughter Ayse have been estranged from  patient for over 10 years but stepped in few years ago to assist. Daughter Ayse reports patient dementia with visual /aduitory hallucinations for over 2 years and consisting of dead people and cats for 2 years. Baseline ambulates with walker up until 10 days ago.    Patient also found to have diffuse body lesions consisting for blisters and scab over lesion through out body sparring the buttock and perineum. Patient had similar lesions in Sept however only on torso which cleared up with steroid injections in Sept and October. By end of November, lesions completely resolved. 10 days ago patient was brought to Urgent Care on [facility] then told had cellulitis prescribed clindamycin 300 mg TID.    CXR no acute process. CTH  No acute intracranial abnormality. Afebrile and no leukocytosis. UA no evidence of infection.  PT/OT evaluated the patient and recommended SNF. Patient was found to be in A-fib and cards consulted. No NOAC due to frequent falls and non-compliance.  Dermatology was consulted and diagnosed patient with pemphigus vulgaris.  The patient was started on IV steroids and completed a course of 5 days.  Patient was accepted at [facility]. I spoke with dermatology and nothing more to do other than follow up with them and social workers are aware.  The patient will be discharged to [facility] today. Activity as tolerated. Diet low Na, ADA 1800 jocy diet. Follow up with PCP in one week      Interval History: No new issues.     Review of Systems   Constitutional: Negative for activity change.   HENT: Negative for congestion.    Respiratory: Negative for chest tightness and shortness of breath.    Cardiovascular: Negative for chest pain.   Genitourinary: Negative for difficulty urinating.   Musculoskeletal: Negative for arthralgias.   Psychiatric/Behavioral: Negative for agitation.     Objective:     Vital Signs (Most Recent):  Temp: 98.1 °F (36.7 °C) (01/20/20 0803)  Pulse: 73 (01/20/20 0803)  Resp: 18 (01/20/20  "0803)  BP: 134/63 (01/20/20 0803)  SpO2: 98 % (01/20/20 0803) Vital Signs (24h Range):  Temp:  [97.2 °F (36.2 °C)-98.6 °F (37 °C)] 98.1 °F (36.7 °C)  Pulse:  [67-83] 73  Resp:  [17-18] 18  SpO2:  [96 %-98 %] 98 %  BP: (102-147)/(50-73) 134/63     Weight: 61 kg (134 lb 7.7 oz)  Body mass index is 23.08 kg/m².    Intake/Output Summary (Last 24 hours) at 1/20/2020 1042  Last data filed at 1/20/2020 0000  Gross per 24 hour   Intake 1160 ml   Output --   Net 1160 ml      Physical Exam   Constitutional: She is oriented to person, place, and time. She appears well-developed and well-nourished.   HENT:   Head: Normocephalic and atraumatic.   Neurological: She is alert and oriented to person, place, and time.   Skin: Skin is warm and dry.   Psychiatric: She has a normal mood and affect. Her behavior is normal.   Nursing note and vitals reviewed.      Significant Labs: BMP: No results for input(s): GLU, NA, K, CL, CO2, BUN, CREATININE, CALCIUM, MG in the last 48 hours.  CBC: No results for input(s): WBC, HGB, HCT, PLT in the last 48 hours.    Significant Imaging:       Assessment/Plan:      * Pemphigus vulgaris    See physical exam. Patient was seen by Dermatology Dr. Hinton in Sept for similar lesion but not this extensive. Lesion cleared with steroid injections? X 2. Daughter Ayse reports biopsy showed "Dermatitis of unknown origin."   Denies any new antibiotics up untial 12/29 placed on clindamycin for "cellulits" when seen by [facility] [address]   Will hold clindamycin  Consult Dermatology -Called office [phone] at approx 1030 and again 1100 however provider will not available until 1 pm.  Consult wound care to assist.       Dermatology recommended start IV and topical steroids for Pemphigus Vulgaris.   IV solumedrol 30 mg IV BID x 5 days  Triamcinolone 0.1 % cream BID   For punch biopsy today  If no improvement in 5 days, may need to start IVIG.     Assume derm will see patient today?    Continue steroids today.  May d/c " tomorrow     Will ask nursing to contact dermatology. I need a plan/recs     Spoke with Derm on 1/15- awaiting confirmation test of this diagnosis- they said they would have to day and text me results.     Spoke to Derm on 1/17.  They noted Bx results may not be back until Monday.  Not sure why SNF will not accept until then. Will speak to case management this am.     I have not been informed of bx results.      Patient will follow up with derm.  made aware.         Paroxysmal atrial fibrillation  No NOAC per cards. Rate controlled       Chronic combined systolic and diastolic heart failure  EF of 25-30%,Euvolemic. Not on diuretics at home.  Continue metoprolol. Resume ACEI/ARB if renal function stable and BP tolerates.       Pressure injury of left heel, unstageable  Apply boot. Wound care consult       Recurrent falls  See above #1.     PT/OT rec: SNF.   Likely not a candidate for anti-coagulation due to falls.        Diffuse Skin Breakdown and Lesions  See above      CKD stage 2 due to type 2 diabetes mellitus  Stable. Repeat labs pending.     With some pre-renal failure. Will hold lasix       Anemia, chronic disease  Obtain iron studies, vitamin B 12, folate level      Dementia without behavioral disturbance  Per patient, history of dementia with visual auditory hallucinations x 2 years. Baseline oriented to self and place but not time.   EKG accelerated Junctional 72. Troponin x1  Lactate normal.  UA no infection. CTH no acute abnormality.   Contributing to falls (Dec 4, Jan 3, and yesterday Jan 8 x 2 ). All the falls have been unwitnessed as patient lives alone.  Currently mental status at baseline  PT/OT consult  Consulted  for NH placement.      Type 2 diabetes mellitus with hyperglycemia  Lab Results   Component Value Date    HGBA1C 7.4 (H) 01/09/2020   obtain A1c.   SSI and adjust accordingly,  On prandial ,basal,and SSI at [facility].    Increasing insulin for solumedrol    Well controlled  now.  Will have to cut back insulin once steroids stopped     Elevated sugars this am.  SS. Hopefully can wean or cut back steroids soon     Now that steroids are being cut back- better glucose control. Will cut back on insulin           AICD (automatic cardioverter/defibrillator) present  No current issues.       Essential hypertension  Continue metoprolol. Resume ramipril ? Hydralazine prn.       CAD (coronary artery disease)  Daughter Ayse reports history of MI/stents/AICD/PPM but not sure dates and previous test.   Denies chest pain or shortness of breath  Continue ASA, plavix, metoprolol. Previously on ramipril.        VTE Risk Mitigation (From admission, onward)         Ordered     IP VTE HIGH RISK PATIENT  Once      01/09/20 0815     Place CHANO hose  Until discontinued      01/09/20 0815     Place sequential compression device  Until discontinued      01/09/20 0815              Will d/c to [facility] today         Sammy Rodrigues MD  Department of Hospital Medicine   [facility]

## 2020-01-20 NOTE — SUBJECTIVE & OBJECTIVE
Interval History: No new issues.     Review of Systems   Constitutional: Negative for activity change.   HENT: Negative for congestion.    Respiratory: Negative for chest tightness and shortness of breath.    Cardiovascular: Negative for chest pain.   Genitourinary: Negative for difficulty urinating.   Musculoskeletal: Negative for arthralgias.   Psychiatric/Behavioral: Negative for agitation.     Objective:     Vital Signs (Most Recent):  Temp: 98.1 °F (36.7 °C) (01/20/20 0803)  Pulse: 73 (01/20/20 0803)  Resp: 18 (01/20/20 0803)  BP: 134/63 (01/20/20 0803)  SpO2: 98 % (01/20/20 0803) Vital Signs (24h Range):  Temp:  [97.2 °F (36.2 °C)-98.6 °F (37 °C)] 98.1 °F (36.7 °C)  Pulse:  [67-83] 73  Resp:  [17-18] 18  SpO2:  [96 %-98 %] 98 %  BP: (102-147)/(50-73) 134/63     Weight: 61 kg (134 lb 7.7 oz)  Body mass index is 23.08 kg/m².    Intake/Output Summary (Last 24 hours) at 1/20/2020 1042  Last data filed at 1/20/2020 0000  Gross per 24 hour   Intake 1160 ml   Output --   Net 1160 ml      Physical Exam   Constitutional: She is oriented to person, place, and time. She appears well-developed and well-nourished.   HENT:   Head: Normocephalic and atraumatic.   Neurological: She is alert and oriented to person, place, and time.   Skin: Skin is warm and dry.   Psychiatric: She has a normal mood and affect. Her behavior is normal.   Nursing note and vitals reviewed.      Significant Labs: BMP: No results for input(s): GLU, NA, K, CL, CO2, BUN, CREATININE, CALCIUM, MG in the last 48 hours.  CBC: No results for input(s): WBC, HGB, HCT, PLT in the last 48 hours.    Significant Imaging:

## 2020-01-20 NOTE — ASSESSMENT & PLAN NOTE
"  See physical exam. Patient was seen by Dermatology Dr. Hinton in Sept for similar lesion but not this extensive. Lesion cleared with steroid injections? X 2. Daughter Ayse reports biopsy showed "Dermatitis of unknown origin."   Denies any new antibiotics up untial 12/29 placed on clindamycin for "cellulits" when seen by [facility] [address].   Will hold clindamycin  Consult Dermatology -Called office [phone] at approx 1030 and again 1100 however provider will not available until 1 pm.  Consult wound care to assist.       Dermatology recommended start IV and topical steroids for Pemphigus Vulgaris.   IV solumedrol 30 mg IV BID x 5 days  Triamcinolone 0.1 % cream BID   For punch biopsy today  If no improvement in 5 days, may need to start IVIG.     Assume derm will see patient today?    Continue steroids today.  May d/c tomorrow     Will ask nursing to contact dermatology. I need a plan/recs     Spoke with Derm on 1/15- awaiting confirmation test of this diagnosis- they said they would have to day and text me results.     Spoke to Derm on 1/17.  They noted Bx results may not be back until Monday.  Not sure why SNF will not accept until then. Will speak to case management this am.     I have not been informed of bx results.      Patient will follow up with derm.  made aware.       "

## 2020-01-20 NOTE — PLAN OF CARE
01/20/20 1436   Final Note   Assessment Type Final Discharge Note   Anticipated Discharge Disposition Int Care Fac   Hospital Follow Up  Appt(s) scheduled? Yes   Discharge plans and expectations educations in teach back method with documentation complete? Yes   Right Care Referral Info   Post Acute Recommendation Other   Referral Type NH long-term care   Facility Name [facility]   Post-Acute Status   Post-Acute Authorization Placement   Post-Acute Placement Status Set-up Complete   Discharge Delays (!) Patient Transportation   Call report information received from Bekah at [facility] and given to nurse, Latia along with transfer envelope.    ADT 30 order placed for Van Transportation.  Requested  time:  1530  If transportation does not arrive at ETA time nurse will be instructed to follow protocol for transportation below:   How can I get in touch directly with dispatch, if needed?                 Non-emergent dispatch: [phone]      TN notified son-in-lawDav of acceptance at [facility] and tj time of transport    +++NURSING:  If Van does not arrive at requested time please call the above Non Emergent Dispatcher.  If issue not resolved please escalate to your charge nurse for further instructions.

## 2020-01-20 NOTE — NURSING
19:05 Received bedside report from SHANTELL Ramos .Patient alert and oriented. No pain. Nos sign of distress.  No IV line. Cardiac monitor in place. On room air. Call bell given on her reach, instructed to call for assistance all the time. Bed alarm on. Bed on lowest position. Non skid socks on. Safety maintained.

## 2020-03-12 NOTE — ASSESSMENT & PLAN NOTE
Currently in AFib.  I saw AFib on past EKGs in 2018 also.  Asymptomatic from the AFib.  Had a detailed discussion with the primary team.  Patient has frequent falls, history of medication noncompliance as well as dementia with behavioral disturbances.  This puts at her at high risk for anticoagulation, especially because of frequent falls.   Yes